# Patient Record
Sex: FEMALE | Race: WHITE | NOT HISPANIC OR LATINO | Employment: OTHER | URBAN - METROPOLITAN AREA
[De-identification: names, ages, dates, MRNs, and addresses within clinical notes are randomized per-mention and may not be internally consistent; named-entity substitution may affect disease eponyms.]

---

## 2022-06-28 ENCOUNTER — HOSPITAL ENCOUNTER (INPATIENT)
Facility: HOSPITAL | Age: 57
LOS: 1 days | Discharge: HOME | DRG: 377 | End: 2022-07-01
Attending: STUDENT IN AN ORGANIZED HEALTH CARE EDUCATION/TRAINING PROGRAM | Admitting: INTERNAL MEDICINE
Payer: COMMERCIAL

## 2022-06-28 ENCOUNTER — APPOINTMENT (OUTPATIENT)
Dept: CT IMAGING | Facility: HOSPITAL | Age: 57
DRG: 377 | End: 2022-06-28
Payer: COMMERCIAL

## 2022-06-28 ENCOUNTER — APPOINTMENT (OUTPATIENT)
Dept: MRI IMAGING | Facility: HOSPITAL | Age: 57
DRG: 377 | End: 2022-06-28
Attending: PHYSICIAN ASSISTANT
Payer: COMMERCIAL

## 2022-06-28 DIAGNOSIS — K92.1 MELENA: ICD-10-CM

## 2022-06-28 DIAGNOSIS — F10.20 ALCOHOL USE DISORDER, SEVERE, DEPENDENCE (H): ICD-10-CM

## 2022-06-28 DIAGNOSIS — S12.201D CLOSED NONDISPLACED FRACTURE OF THIRD CERVICAL VERTEBRA WITH ROUTINE HEALING, UNSPECIFIED FRACTURE MORPHOLOGY, SUBSEQUENT ENCOUNTER: ICD-10-CM

## 2022-06-28 DIAGNOSIS — N17.9 ACUTE KIDNEY INJURY (H): ICD-10-CM

## 2022-06-28 DIAGNOSIS — S12.9XXA CLOSED FRACTURE OF TRANSVERSE PROCESS OF CERVICAL VERTEBRA, INITIAL ENCOUNTER (H): ICD-10-CM

## 2022-06-28 DIAGNOSIS — R79.89 ABNORMAL LFTS: Primary | ICD-10-CM

## 2022-06-28 PROBLEM — E87.5 HYPERKALEMIA: Status: ACTIVE | Noted: 2022-06-28

## 2022-06-28 PROBLEM — K76.0 HEPATIC STEATOSIS: Status: ACTIVE | Noted: 2022-06-28

## 2022-06-28 PROBLEM — E66.9 OBESITY: Status: ACTIVE | Noted: 2022-06-28

## 2022-06-28 PROBLEM — M54.50 CHRONIC LOW BACK PAIN: Status: ACTIVE | Noted: 2022-06-28

## 2022-06-28 PROBLEM — G89.29 CHRONIC LOW BACK PAIN: Status: ACTIVE | Noted: 2022-06-28

## 2022-06-28 PROBLEM — F32.A DEPRESSION: Status: ACTIVE | Noted: 2022-06-28

## 2022-06-28 PROBLEM — M79.7 FIBROMYALGIA: Status: ACTIVE | Noted: 2022-06-28

## 2022-06-28 PROBLEM — I10 HTN (HYPERTENSION): Status: ACTIVE | Noted: 2022-06-28

## 2022-06-28 PROBLEM — S12.200A: Status: ACTIVE | Noted: 2022-06-28

## 2022-06-28 PROBLEM — E87.1 HYPONATREMIA: Status: ACTIVE | Noted: 2022-06-28

## 2022-06-28 PROBLEM — J45.909 ASTHMA: Status: ACTIVE | Noted: 2022-06-28

## 2022-06-28 LAB
ABO/RH(D): NORMAL
ALBUMIN SERPL-MCNC: 3.6 G/DL (ref 3.5–5)
ALP SERPL-CCNC: 160 U/L (ref 45–120)
ALT SERPL W P-5'-P-CCNC: 54 U/L (ref 0–45)
ANION GAP SERPL CALCULATED.3IONS-SCNC: 11 MMOL/L (ref 5–18)
ANTIBODY SCREEN: NEGATIVE
AST SERPL W P-5'-P-CCNC: 43 U/L (ref 0–40)
ATRIAL RATE - MUSE: 79 BPM
BILIRUB DIRECT SERPL-MCNC: 0.3 MG/DL
BILIRUB SERPL-MCNC: 0.7 MG/DL (ref 0–1)
BUN SERPL-MCNC: 38 MG/DL (ref 8–22)
CALCIUM SERPL-MCNC: 9.8 MG/DL (ref 8.5–10.5)
CHLORIDE BLD-SCNC: 96 MMOL/L (ref 98–107)
CO2 SERPL-SCNC: 25 MMOL/L (ref 22–31)
CREAT SERPL-MCNC: 1.13 MG/DL (ref 0.6–1.1)
DIASTOLIC BLOOD PRESSURE - MUSE: NORMAL MMHG
ERYTHROCYTE [DISTWIDTH] IN BLOOD BY AUTOMATED COUNT: 13.3 % (ref 10–15)
ETHANOL SERPL-MCNC: <10 MG/DL
GFR SERPL CREATININE-BSD FRML MDRD: 56 ML/MIN/1.73M2
GLUCOSE BLD-MCNC: 97 MG/DL (ref 70–125)
HCT VFR BLD AUTO: 36.6 % (ref 35–47)
HEMOCCULT STL QL: POSITIVE
HGB BLD-MCNC: 10.6 G/DL (ref 11.7–15.7)
HGB BLD-MCNC: 12.5 G/DL (ref 11.7–15.7)
HOLD SPECIMEN: NORMAL
INR PPP: 0.93 (ref 0.85–1.15)
INTERPRETATION ECG - MUSE: NORMAL
LACTATE SERPL-SCNC: 1 MMOL/L (ref 0.7–2)
MAGNESIUM SERPL-MCNC: 1.8 MG/DL (ref 1.8–2.6)
MCH RBC QN AUTO: 38.8 PG (ref 26.5–33)
MCHC RBC AUTO-ENTMCNC: 34.2 G/DL (ref 31.5–36.5)
MCV RBC AUTO: 114 FL (ref 78–100)
P AXIS - MUSE: 65 DEGREES
PLATELET # BLD AUTO: 202 10E3/UL (ref 150–450)
POTASSIUM BLD-SCNC: 5.2 MMOL/L (ref 3.5–5)
PR INTERVAL - MUSE: 118 MS
PROT SERPL-MCNC: 6.7 G/DL (ref 6–8)
QRS DURATION - MUSE: 72 MS
QT - MUSE: 396 MS
QTC - MUSE: 454 MS
R AXIS - MUSE: -17 DEGREES
RADIOLOGIST FLAGS: ABNORMAL
RADIOLOGIST FLAGS: ABNORMAL
RBC # BLD AUTO: 3.22 10E6/UL (ref 3.8–5.2)
SARS-COV-2 RNA RESP QL NAA+PROBE: NEGATIVE
SODIUM SERPL-SCNC: 132 MMOL/L (ref 136–145)
SPECIMEN EXPIRATION DATE: NORMAL
SYSTOLIC BLOOD PRESSURE - MUSE: NORMAL MMHG
T AXIS - MUSE: 73 DEGREES
TROPONIN I SERPL-MCNC: <0.01 NG/ML (ref 0–0.29)
VENTRICULAR RATE- MUSE: 79 BPM
WBC # BLD AUTO: 6.2 10E3/UL (ref 4–11)

## 2022-06-28 PROCEDURE — 96375 TX/PRO/DX INJ NEW DRUG ADDON: CPT

## 2022-06-28 PROCEDURE — 72125 CT NECK SPINE W/O DYE: CPT

## 2022-06-28 PROCEDURE — C9113 INJ PANTOPRAZOLE SODIUM, VIA: HCPCS | Performed by: INTERNAL MEDICINE

## 2022-06-28 PROCEDURE — 82272 OCCULT BLD FECES 1-3 TESTS: CPT | Performed by: PHYSICIAN ASSISTANT

## 2022-06-28 PROCEDURE — 70450 CT HEAD/BRAIN W/O DYE: CPT

## 2022-06-28 PROCEDURE — 86901 BLOOD TYPING SEROLOGIC RH(D): CPT | Performed by: PHYSICIAN ASSISTANT

## 2022-06-28 PROCEDURE — 70498 CT ANGIOGRAPHY NECK: CPT

## 2022-06-28 PROCEDURE — 258N000003 HC RX IP 258 OP 636: Performed by: PHYSICIAN ASSISTANT

## 2022-06-28 PROCEDURE — 82248 BILIRUBIN DIRECT: CPT | Performed by: PHYSICIAN ASSISTANT

## 2022-06-28 PROCEDURE — 87635 SARS-COV-2 COVID-19 AMP PRB: CPT | Performed by: INTERNAL MEDICINE

## 2022-06-28 PROCEDURE — 84484 ASSAY OF TROPONIN QUANT: CPT | Performed by: PHYSICIAN ASSISTANT

## 2022-06-28 PROCEDURE — 80053 COMPREHEN METABOLIC PANEL: CPT | Performed by: PHYSICIAN ASSISTANT

## 2022-06-28 PROCEDURE — 250N000013 HC RX MED GY IP 250 OP 250 PS 637: Performed by: INTERNAL MEDICINE

## 2022-06-28 PROCEDURE — 96374 THER/PROPH/DIAG INJ IV PUSH: CPT | Mod: 59

## 2022-06-28 PROCEDURE — 250N000011 HC RX IP 250 OP 636: Performed by: STUDENT IN AN ORGANIZED HEALTH CARE EDUCATION/TRAINING PROGRAM

## 2022-06-28 PROCEDURE — 36415 COLL VENOUS BLD VENIPUNCTURE: CPT | Performed by: INTERNAL MEDICINE

## 2022-06-28 PROCEDURE — 83735 ASSAY OF MAGNESIUM: CPT | Performed by: INTERNAL MEDICINE

## 2022-06-28 PROCEDURE — 85018 HEMOGLOBIN: CPT | Performed by: INTERNAL MEDICINE

## 2022-06-28 PROCEDURE — G0378 HOSPITAL OBSERVATION PER HR: HCPCS

## 2022-06-28 PROCEDURE — 99285 EMERGENCY DEPT VISIT HI MDM: CPT | Mod: 25

## 2022-06-28 PROCEDURE — C9803 HOPD COVID-19 SPEC COLLECT: HCPCS

## 2022-06-28 PROCEDURE — 85610 PROTHROMBIN TIME: CPT | Performed by: PHYSICIAN ASSISTANT

## 2022-06-28 PROCEDURE — 93005 ELECTROCARDIOGRAM TRACING: CPT | Performed by: PHYSICIAN ASSISTANT

## 2022-06-28 PROCEDURE — 99220 PR INITIAL OBSERVATION CARE,LEVEL III: CPT | Performed by: INTERNAL MEDICINE

## 2022-06-28 PROCEDURE — 72141 MRI NECK SPINE W/O DYE: CPT

## 2022-06-28 PROCEDURE — 82077 ASSAY SPEC XCP UR&BREATH IA: CPT | Performed by: PHYSICIAN ASSISTANT

## 2022-06-28 PROCEDURE — 86850 RBC ANTIBODY SCREEN: CPT | Performed by: PHYSICIAN ASSISTANT

## 2022-06-28 PROCEDURE — 96361 HYDRATE IV INFUSION ADD-ON: CPT

## 2022-06-28 PROCEDURE — 36415 COLL VENOUS BLD VENIPUNCTURE: CPT | Performed by: STUDENT IN AN ORGANIZED HEALTH CARE EDUCATION/TRAINING PROGRAM

## 2022-06-28 PROCEDURE — 83605 ASSAY OF LACTIC ACID: CPT | Performed by: STUDENT IN AN ORGANIZED HEALTH CARE EDUCATION/TRAINING PROGRAM

## 2022-06-28 PROCEDURE — 250N000011 HC RX IP 250 OP 636: Performed by: INTERNAL MEDICINE

## 2022-06-28 PROCEDURE — 36415 COLL VENOUS BLD VENIPUNCTURE: CPT | Performed by: PHYSICIAN ASSISTANT

## 2022-06-28 PROCEDURE — 258N000003 HC RX IP 258 OP 636: Performed by: INTERNAL MEDICINE

## 2022-06-28 PROCEDURE — 85027 COMPLETE CBC AUTOMATED: CPT | Performed by: PHYSICIAN ASSISTANT

## 2022-06-28 RX ORDER — PROCHLORPERAZINE MALEATE 10 MG
10 TABLET ORAL EVERY 6 HOURS PRN
Status: DISCONTINUED | OUTPATIENT
Start: 2022-06-28 | End: 2022-07-01 | Stop reason: HOSPADM

## 2022-06-28 RX ORDER — DULOXETIN HYDROCHLORIDE 60 MG/1
120 CAPSULE, DELAYED RELEASE ORAL AT BEDTIME
Status: ON HOLD | COMMUNITY
End: 2022-08-08

## 2022-06-28 RX ORDER — ACETAMINOPHEN 325 MG/1
975 TABLET ORAL EVERY 8 HOURS
Status: DISCONTINUED | OUTPATIENT
Start: 2022-06-28 | End: 2022-07-01 | Stop reason: HOSPADM

## 2022-06-28 RX ORDER — FLUTICASONE PROPIONATE 50 MCG
2 SPRAY, SUSPENSION (ML) NASAL DAILY
Status: DISCONTINUED | OUTPATIENT
Start: 2022-06-29 | End: 2022-07-01 | Stop reason: HOSPADM

## 2022-06-28 RX ORDER — DULOXETIN HYDROCHLORIDE 60 MG/1
120 CAPSULE, DELAYED RELEASE ORAL AT BEDTIME
Status: DISCONTINUED | OUTPATIENT
Start: 2022-06-28 | End: 2022-07-01 | Stop reason: HOSPADM

## 2022-06-28 RX ORDER — ONDANSETRON 2 MG/ML
4 INJECTION INTRAMUSCULAR; INTRAVENOUS EVERY 6 HOURS PRN
Status: DISCONTINUED | OUTPATIENT
Start: 2022-06-28 | End: 2022-07-01 | Stop reason: HOSPADM

## 2022-06-28 RX ORDER — SIMVASTATIN 10 MG
20 TABLET ORAL AT BEDTIME
Status: DISCONTINUED | OUTPATIENT
Start: 2022-06-28 | End: 2022-07-01 | Stop reason: HOSPADM

## 2022-06-28 RX ORDER — OXYCODONE HYDROCHLORIDE 5 MG/1
5 TABLET ORAL EVERY 4 HOURS PRN
Status: DISCONTINUED | OUTPATIENT
Start: 2022-06-28 | End: 2022-07-01 | Stop reason: HOSPADM

## 2022-06-28 RX ORDER — MORPHINE SULFATE 2 MG/ML
2 INJECTION, SOLUTION INTRAMUSCULAR; INTRAVENOUS ONCE
Status: COMPLETED | OUTPATIENT
Start: 2022-06-28 | End: 2022-06-28

## 2022-06-28 RX ORDER — ONDANSETRON 4 MG/1
4 TABLET, ORALLY DISINTEGRATING ORAL EVERY 8 HOURS PRN
COMMUNITY

## 2022-06-28 RX ORDER — HYDRALAZINE HYDROCHLORIDE 20 MG/ML
10 INJECTION INTRAMUSCULAR; INTRAVENOUS EVERY 4 HOURS PRN
Status: DISCONTINUED | OUTPATIENT
Start: 2022-06-28 | End: 2022-07-01 | Stop reason: HOSPADM

## 2022-06-28 RX ORDER — ACETAMINOPHEN 325 MG/1
650 TABLET ORAL EVERY 6 HOURS PRN
Status: DISCONTINUED | OUTPATIENT
Start: 2022-06-28 | End: 2022-06-28

## 2022-06-28 RX ORDER — LORATADINE 10 MG/1
10 TABLET ORAL DAILY PRN
Status: DISCONTINUED | OUTPATIENT
Start: 2022-06-28 | End: 2022-07-01 | Stop reason: HOSPADM

## 2022-06-28 RX ORDER — IOPAMIDOL 755 MG/ML
75 INJECTION, SOLUTION INTRAVASCULAR ONCE
Status: COMPLETED | OUTPATIENT
Start: 2022-06-28 | End: 2022-06-28

## 2022-06-28 RX ORDER — PROCHLORPERAZINE 25 MG
25 SUPPOSITORY, RECTAL RECTAL EVERY 12 HOURS PRN
Status: DISCONTINUED | OUTPATIENT
Start: 2022-06-28 | End: 2022-07-01 | Stop reason: HOSPADM

## 2022-06-28 RX ORDER — CELECOXIB 200 MG/1
400 CAPSULE ORAL AT BEDTIME
COMMUNITY
End: 2022-07-01

## 2022-06-28 RX ORDER — ALBUTEROL SULFATE 90 UG/1
2 AEROSOL, METERED RESPIRATORY (INHALATION) EVERY 4 HOURS PRN
Status: DISCONTINUED | OUTPATIENT
Start: 2022-06-28 | End: 2022-07-01 | Stop reason: HOSPADM

## 2022-06-28 RX ORDER — CALCIUM CARBONATE 500 MG/1
1000 TABLET, CHEWABLE ORAL 4 TIMES DAILY PRN
Status: DISCONTINUED | OUTPATIENT
Start: 2022-06-28 | End: 2022-07-01 | Stop reason: HOSPADM

## 2022-06-28 RX ORDER — ACETAMINOPHEN 650 MG/1
650 SUPPOSITORY RECTAL EVERY 6 HOURS PRN
Status: DISCONTINUED | OUTPATIENT
Start: 2022-06-28 | End: 2022-06-28

## 2022-06-28 RX ORDER — LANOLIN ALCOHOL/MO/W.PET/CERES
3 CREAM (GRAM) TOPICAL
Status: DISCONTINUED | OUTPATIENT
Start: 2022-06-28 | End: 2022-06-30

## 2022-06-28 RX ORDER — ONDANSETRON 4 MG/1
4 TABLET, ORALLY DISINTEGRATING ORAL EVERY 6 HOURS PRN
Status: DISCONTINUED | OUTPATIENT
Start: 2022-06-28 | End: 2022-07-01 | Stop reason: HOSPADM

## 2022-06-28 RX ORDER — LISINOPRIL 20 MG/1
20 TABLET ORAL AT BEDTIME
COMMUNITY
End: 2022-07-01

## 2022-06-28 RX ORDER — LORATADINE 10 MG/1
10 TABLET ORAL DAILY PRN
COMMUNITY

## 2022-06-28 RX ORDER — SODIUM CHLORIDE 9 MG/ML
INJECTION, SOLUTION INTRAVENOUS CONTINUOUS
Status: DISCONTINUED | OUTPATIENT
Start: 2022-06-28 | End: 2022-06-29

## 2022-06-28 RX ADMIN — IOPAMIDOL 75 ML: 755 INJECTION, SOLUTION INTRAVENOUS at 15:13

## 2022-06-28 RX ADMIN — MORPHINE SULFATE 2 MG: 2 INJECTION, SOLUTION INTRAMUSCULAR; INTRAVENOUS at 16:41

## 2022-06-28 RX ADMIN — PANTOPRAZOLE SODIUM 40 MG: 40 INJECTION, POWDER, FOR SOLUTION INTRAVENOUS at 18:35

## 2022-06-28 RX ADMIN — OXYCODONE HYDROCHLORIDE 5 MG: 5 TABLET ORAL at 21:49

## 2022-06-28 RX ADMIN — SIMVASTATIN 20 MG: 10 TABLET, FILM COATED ORAL at 21:49

## 2022-06-28 RX ADMIN — ACETAMINOPHEN 975 MG: 325 TABLET, FILM COATED ORAL at 21:49

## 2022-06-28 RX ADMIN — SODIUM CHLORIDE: 9 INJECTION, SOLUTION INTRAVENOUS at 19:30

## 2022-06-28 RX ADMIN — SODIUM CHLORIDE 1000 ML: 9 INJECTION, SOLUTION INTRAVENOUS at 14:51

## 2022-06-28 RX ADMIN — DULOXETINE HYDROCHLORIDE 120 MG: 60 CAPSULE, DELAYED RELEASE PELLETS ORAL at 21:49

## 2022-06-28 SDOH — ECONOMIC STABILITY: TRANSPORTATION INSECURITY
IN THE PAST 12 MONTHS, HAS LACK OF TRANSPORTATION KEPT YOU FROM MEETINGS, WORK, OR FROM GETTING THINGS NEEDED FOR DAILY LIVING?: NO

## 2022-06-28 SDOH — ECONOMIC STABILITY: FOOD INSECURITY: WITHIN THE PAST 12 MONTHS, YOU WORRIED THAT YOUR FOOD WOULD RUN OUT BEFORE YOU GOT MONEY TO BUY MORE.: NEVER TRUE

## 2022-06-28 SDOH — HEALTH STABILITY: PHYSICAL HEALTH: ON AVERAGE, HOW MANY MINUTES DO YOU ENGAGE IN EXERCISE AT THIS LEVEL?: 0 MIN

## 2022-06-28 SDOH — HEALTH STABILITY: PHYSICAL HEALTH: ON AVERAGE, HOW MANY DAYS PER WEEK DO YOU ENGAGE IN MODERATE TO STRENUOUS EXERCISE (LIKE A BRISK WALK)?: 0 DAYS

## 2022-06-28 SDOH — ECONOMIC STABILITY: TRANSPORTATION INSECURITY
IN THE PAST 12 MONTHS, HAS THE LACK OF TRANSPORTATION KEPT YOU FROM MEDICAL APPOINTMENTS OR FROM GETTING MEDICATIONS?: NO

## 2022-06-28 SDOH — ECONOMIC STABILITY: FOOD INSECURITY: WITHIN THE PAST 12 MONTHS, THE FOOD YOU BOUGHT JUST DIDN'T LAST AND YOU DIDN'T HAVE MONEY TO GET MORE.: NEVER TRUE

## 2022-06-28 ASSESSMENT — ENCOUNTER SYMPTOMS
FEVER: 0
BLOOD IN STOOL: 1
DIARRHEA: 1
WEAKNESS: 0
HEMATURIA: 0
HEADACHES: 0
COUGH: 0
DIZZINESS: 1
BACK PAIN: 1
CHILLS: 0
DYSURIA: 0
COLOR CHANGE: 1
VOMITING: 0
SHORTNESS OF BREATH: 0
NAUSEA: 0
FREQUENCY: 0
ARTHRALGIAS: 1
NUMBNESS: 0
ABDOMINAL PAIN: 0

## 2022-06-28 ASSESSMENT — SOCIAL DETERMINANTS OF HEALTH (SDOH)
HOW OFTEN DO YOU ATTENT MEETINGS OF THE CLUB OR ORGANIZATION YOU BELONG TO?: NEVER
HOW OFTEN DO YOU GET TOGETHER WITH FRIENDS OR RELATIVES?: ONCE A WEEK
IN A TYPICAL WEEK, HOW MANY TIMES DO YOU TALK ON THE PHONE WITH FAMILY, FRIENDS, OR NEIGHBORS?: ONCE A WEEK
HOW OFTEN DO YOU ATTEND CHURCH OR RELIGIOUS SERVICES?: NEVER
HOW HARD IS IT FOR YOU TO PAY FOR THE VERY BASICS LIKE FOOD, HOUSING, MEDICAL CARE, AND HEATING?: NOT HARD AT ALL
ARE YOU MARRIED, WIDOWED, DIVORCED, SEPARATED, NEVER MARRIED, OR LIVING WITH A PARTNER?: NEVER MARRIED
DO YOU BELONG TO ANY CLUBS OR ORGANIZATIONS SUCH AS CHURCH GROUPS UNIONS, FRATERNAL OR ATHLETIC GROUPS, OR SCHOOL GROUPS?: NO

## 2022-06-28 ASSESSMENT — LIFESTYLE VARIABLES
HOW MANY STANDARD DRINKS CONTAINING ALCOHOL DO YOU HAVE ON A TYPICAL DAY: 1 OR 2
HOW OFTEN DO YOU HAVE A DRINK CONTAINING ALCOHOL: 2-4 TIMES A MONTH
AUDIT-C TOTAL SCORE: 3
HOW OFTEN DO YOU HAVE SIX OR MORE DRINKS ON ONE OCCASION: LESS THAN MONTHLY
SKIP TO QUESTIONS 9-10: 0

## 2022-06-28 NOTE — PHARMACY-ADMISSION MEDICATION HISTORY
Pharmacy Note - Admission Medication History    Pertinent Provider Information:       ______________________________________________________________________    Prior To Admission (PTA) med list completed and updated in EMR.       PTA Med List   Medication Sig Last Dose     albuterol (PROAIR HFA/PROVENTIL HFA/VENTOLIN HFA) 108 (90 Base) MCG/ACT inhaler Inhale 2 puffs into the lungs every 4 hours as needed Unknown at Unknown time     celecoxib (CELEBREX) 200 MG capsule Take 400 mg by mouth At Bedtime 6/27/2022 at Unknown time     DULoxetine (CYMBALTA) 60 MG capsule Take 120 mg by mouth At Bedtime 6/27/2022 at Unknown time     fluticasone (FLONASE) 50 MCG/ACT nasal spray Spray 2 sprays in nostril daily 6/28/2022 at Unknown time     lisinopril (ZESTRIL) 20 MG tablet Take 20 mg by mouth At Bedtime 6/27/2022 at Unknown time     loratadine (CLARITIN) 10 MG tablet Take 10 mg by mouth daily as needed for allergies Unknown at Unknown time     ondansetron (ZOFRAN ODT) 4 MG ODT tab Take 4 mg by mouth every 8 hours as needed for nausea Unknown at Unknown time       [facility]  Method of interview communication: in-person    Summary of Changes to PTA Med List  New: Celebrex, duloxetine, lisinopril, loratadine, Zofran  Discontinued:    Changed:      Patient was asked about OTC/herbal products specifically.  PTA med list reflects this.    In the past week, patient estimated taking medication this percent of the time:  greater than 90%.    Allergies were reviewed, assessed, and updated with the patient.      Patient did not bring any medications to the hospital and can't retrieve from home. No multi-dose medications are available for use during hospital stay.     The information provided in this note is only as accurate as the sources available at the time of the update(s).    Thank you for the opportunity to participate in the care of this patient.    Dwayne Porras HALEY  6/28/2022 3:59  PM

## 2022-06-28 NOTE — ED NOTES
Pt herec/o dark, tarry stools x 2 beginning last pm. Fall last Sunday with pain to left side of neck, right hip, low back pain. Pt is AAO x 3. When pt fell Sunday she woke up on the floor and does not remember falling. Hx of ETOH abuse drinks approx every other day  Neuro: Oriented x4  IV/drain: PIV right arm   VSS: VSS on RA  Resp: Clear    Cardio: WDL.    Pain/Discomfort: chronic back and hip pain  Activity: Up independently   Skin: bruising left side of neck and left arm

## 2022-06-28 NOTE — PROGRESS NOTES
Neurosurgery Treatment Plan:   Reviewed patients chart  56 year old right handed female presented with blood in stool noted bursing on neck and complaint of neck pain with history of fall last week. Unsure why she fell but woke up on the floor. No upper extremity radicular complaints of pain numbness tingling or weakness. Denies urinary incontinence or gait instability. Per provider neurologically intact with good strength in all extremities        Images:   CT reviewed personally with noted C3 right transverse process fracture   No vertebral artery injury        Plan:   Cervical collar at all times  MRI cervical without contrast  Okay for activity with collar on      Sandra Daniel PA-C  [facility] Neurosurgery  O: [phone]

## 2022-06-28 NOTE — ED PROVIDER NOTES
EMERGENCY DEPARTMENT ENCOUNTER      NAME: Rachel Schneider  AGE: 56 year old female  YOB: 1965  MRN: [id number]  EVALUATION DATE & TIME: 6/28/2022 12:55 PM    PCP: Marino Hightower    ED PROVIDER: Kaylah Frank PA-C      Chief Complaint   Patient presents with     Rectal Bleeding     Neck Pain         FINAL IMPRESSION:  1. Closed fracture of transverse process of cervical vertebra, initial encounter (H)    2. Melena    3. Acute kidney injury (H)          MEDICAL DECISION MAKING:    Pertinent Labs & Imaging studies reviewed. (See chart for details)  56 year old female presents to the Emergency Department for evaluation of dark tarry stools x 2 since last night. Also reports falling 2 days ago. Syncope versus fall, patient is unsure. Was able to get up after and ambulate. Has developed bruising to her left anterior neck and left arm. She is not anticoagulated. She denies headaches, vision changes, chest pain, shortness of breath, abdominal pain, musculoskeletal injury from the fall.     Vitals reviewed and notable for soft blood pressure on arrival in triage 82/50. On recheck and throughout time in ED, blood pressure stable 110-120/60-80. Afebrile. Normal heart rate. No respiratory distress. GCS 15. No evidence of head trauma with no scalp tenderness or hematoma. She has aged ecchymosis to left anterior neck. No midline posterior cervical spine tenderness. Full ROM of neck without difficulty. No chest wall tenderness. Lungs CTAB. Normal S1/S2. Abdomen is soft and non-tender. Rectal exam with dark tarry stool in rectal vault. Darkened skin to mid lower back which she reports is from burning her skin on heating pad. No midline spinal tenderness or step off deformities. No focal neuro deficits. Remainder of head to toe trauma exam is unremarkable other than chronic left hip pain that is unchanged. Differential diagnosis includes but not limited to upper versus lower GI bleed including PUD, variceal  bleeding, gastritis, colitis, diverticulitis, hemorrhoids. For syncope versus fall, considered ICH, cervical spine fracture, cardiogenic etiology including ACS, PE, cardiac dysrhythmia, symptomatic anemia, electrolyte derangement, seizure, CVA, hypoglycemia, valvular disease.     Hgb 12.5 which is very reassuring considering her fecal occult is positive though is down trending from most recent check 3 weeks ago when it was 14.0. Type and screen ordered and she was consented for blood in event she becomes hemodynamically unstable or sudden drop in Hgb. She was given 1 L IVF for suspected mild dehydration with slight hyponatremia and mild ARJUN with Cr 1.13, BUN 38. EKG without acute ischemic changes, normal intervals, NSR. Troponin is WNL. No chest pain, pleuritic pain, shortness of breath, tachycardia, hypoxia with low suspicion for PE. Syncope could be secondary to blood loss/hypotension. She recently had lisinopril dose doubled due to elevated blood pressure on pre op exam. Mildly elevated LFTs with alk phos 160, AST 43, ALT 54. Could be alcohol related. Total bilirubin in WNL. No abdominal tenderness, rebound or guarding to suggest acute hepatobiliary etiology and with normal Hgb, low suspicion for brisk bleed that would be detected on CTA. Head and neck imaging given recent fall with unknown cause and poor history. CT head unremarkable however CT cervical spine with nondisplaced fracture of the right transverse process at C3. This involves the right transverse foramen which prompted a CTA neck which was without evidence of vascular or soft tissue injury. She was placed in cervical collar and neurosurgery was consulted who will evaluate patient and also ordered orthotic consult as she will need to wear collar for extended period of time. Given melena, syncopal episode, C3 transverse process fracture, patient will be admitted for further evaluation and work up.    0 minutes of critical care time     ED COURSE:  1:07  PM I met with the patient, obtained history, performed an initial exam, and discussed options and plan for diagnostics and treatment here in the ED.  2:33 PM Spoke with radiologist, acute C3 transverse process fracture. Will place patient in C-collar and patient will go back for a CTA neck. I updated the patient.  2:40 PM I staffed the patient with Dr. Workman.   4:45 PM Spoke with Dr. Rubio, hospitalist, who accepts patient for admission    At the conclusion of the encounter I discussed the results of all of the tests and the indication for admission. The questions were answered. The patient acknowledged understanding and was agreeable with the care plan.     MEDICATIONS GIVEN IN THE EMERGENCY:  Medications   sodium chloride 0.9% infusion (has no administration in time range)   hydrALAZINE (APRESOLINE) injection 10 mg (has no administration in time range)   albuterol (PROVENTIL HFA/VENTOLIN HFA) inhaler (has no administration in time range)   DULoxetine (CYMBALTA) DR capsule 120 mg (has no administration in time range)   fluticasone (FLONASE) 50 MCG/ACT spray 2 spray (has no administration in time range)   loratadine (CLARITIN) tablet 10 mg (has no administration in time range)   acetaminophen (TYLENOL) tablet 650 mg (has no administration in time range)     Or   acetaminophen (TYLENOL) Suppository 650 mg (has no administration in time range)   melatonin tablet 3 mg (has no administration in time range)   ondansetron (ZOFRAN ODT) ODT tab 4 mg (has no administration in time range)     Or   ondansetron (ZOFRAN) injection 4 mg (has no administration in time range)   prochlorperazine (COMPAZINE) injection 10 mg (has no administration in time range)     Or   prochlorperazine (COMPAZINE) tablet 10 mg (has no administration in time range)     Or   prochlorperazine (COMPAZINE) suppository 25 mg (has no administration in time range)   calcium carbonate (TUMS) chewable tablet 1,000 mg (has no administration in time range)  "  pantoprazole (PROTONIX) IV push injection 40 mg (40 mg Intravenous Given 6/28/22 1835)   simvastatin (ZOCOR) tablet 20 mg (has no administration in time range)   0.9% sodium chloride BOLUS (0 mLs Intravenous Stopped 6/28/22 1828)   iopamidol (ISOVUE-370) solution 75 mL (75 mLs Intravenous Given 6/28/22 1513)   morphine (PF) injection 2 mg (2 mg Intravenous Given 6/28/22 1641)       NEW PRESCRIPTIONS STARTED AT TODAY'S ER VISIT  New Prescriptions    No medications on file            =================================================================    HPI:    Patient information was obtained from: Patient    Use of Interpretor: N/A      Rachel Schneider is a 56 year old female with a pertinent history of HTN, cervicalgia s/p cervical spine fusion, hysterectomy (s/p 2014), and cholecystectomy (s/p 2014) who presents to this ED by EMS for evaluation of dark stool.    Patient states that last night her stool was \"tarry\" and dark with a diarrhea consistency. No associated abdominal pain. She is not on anticoagulants and does not take aspirin or NSAIDs regularly. She does endorse drinking 1-2 drinks every couple of days though denies any history of alcohol related illness or alcohol withdrawal.    Patient reports that she has been feeling overall unwell the whole week. She reports feeling weak, tired, dizzy at times. She states that she has experienced \"bad\" heart burn but no chest pain or shortness of breath. She denies any history of GI bleeding. She denies nausea, vomiting, fevers, chills, urinary symptoms, headaches.     Patient also states that on 6/26 (~2 days ago) she had a fall versus syncopal episode. She does not recall how or why she fell. She is unsure if she hit her head. She was able to get up after and ambulate. She has developed bruising on the left anterior neck and scattered across the left arm. She reports she bruises easily. She denies any posterior neck pain, extremity weakness or paresthesias, " bowel/bladder dysfunction.    Of note, patient has chronic right hip pain and back pain. Hip was scheduled to be replaced on 6/13 (~15 days ago) but was not performed due to patients high blood pressure. Her PCP doubled her lisinopril and plan was to reschedule surgery if blood pressure improved. Patient denies additional medical concerns or complaints at this time.       REVIEW OF SYSTEMS:  Review of Systems   Constitutional: Negative for chills and fever.   Respiratory: Negative for cough and shortness of breath.    Cardiovascular: Negative for chest pain.   Gastrointestinal: Positive for blood in stool and diarrhea (tarry, dark). Negative for abdominal pain, nausea and vomiting.   Genitourinary: Negative for dysuria, frequency and hematuria.   Musculoskeletal: Positive for arthralgias (chronic right hip with no change) and back pain (chronic).   Skin: Positive for color change (bruises to neck and left arm).   Neurological: Positive for dizziness and syncope. Negative for weakness, numbness and headaches.   All other systems reviewed and are negative.        PAST MEDICAL HISTORY:  Past Medical History:   Diagnosis Date     Asthma      Chronic low back pain      Depression      Fibromyalgia      Hepatic steatosis      HTN (hypertension)      MRSA infection      Obesity        PAST SURGICAL HISTORY:  Past Surgical History:   Procedure Laterality Date     EXCISE BREAST CYST/FIBROADENOMA/TUMOR/DUCT LESION/NIPPLE LESION/AREOLAR LESION      Description: Breast Surgery Lumpectomy;  Recorded: 07/09/2014;     [facility] REMOVAL GALLBLADDER      Description: Cholecystectomy;  Recorded: 07/09/2014;     [facility] REMOVAL OF TONSILS,<13 Y/O      Description: Tonsillectomy;  Recorded: 07/09/2014;     [facility] CERV SPINE FUSN,ANTER,BELOW C2      Description: Cervical Vertebral Fusion;  Recorded: 07/09/2014;     [facility] GARY W/O FACETEC FORAMOT/HECTOR 1/2 VRT SEG, CERVICAL      Description: Laminectomy Lumbar;  Recorded: 07/09/2014;     [facility] TOTAL ABDOM  HYSTERECTOMY      Description: Hysterectomy;  Recorded: 07/09/2014;           CURRENT MEDICATIONS:      Current Facility-Administered Medications:      acetaminophen (TYLENOL) tablet 650 mg, 650 mg, Oral, Q6H PRN **OR** acetaminophen (TYLENOL) Suppository 650 mg, 650 mg, Rectal, Q6H PRN, Dada Rubio DO     albuterol (PROVENTIL HFA/VENTOLIN HFA) inhaler, 2 puff, Inhalation, Q4H PRN, Dada Rubio DO     calcium carbonate (TUMS) chewable tablet 1,000 mg, 1,000 mg, Oral, 4x Daily PRN, Dada Rubio DO     DULoxetine (CYMBALTA) DR capsule 120 mg, 120 mg, Oral, At Bedtime, Dada Rubio DO     [START ON 6/29/2022] fluticasone (FLONASE) 50 MCG/ACT spray 2 spray, 2 spray, Nasal, Daily, Dada Rubio DO     hydrALAZINE (APRESOLINE) injection 10 mg, 10 mg, Intravenous, Q4H PRN, Dada Rubio DO     loratadine (CLARITIN) tablet 10 mg, 10 mg, Oral, Daily PRN, Dada Rubio DO     melatonin tablet 3 mg, 3 mg, Oral, At Bedtime PRN, Dada Rubio DO     ondansetron (ZOFRAN ODT) ODT tab 4 mg, 4 mg, Oral, Q6H PRN **OR** ondansetron (ZOFRAN) injection 4 mg, 4 mg, Intravenous, Q6H PRN, Dada Rubio DO     pantoprazole (PROTONIX) IV push injection 40 mg, 40 mg, Intravenous, Q12H, Dada Rubio DO, 40 mg at 06/28/22 1835     prochlorperazine (COMPAZINE) injection 10 mg, 10 mg, Intravenous, Q6H PRN **OR** prochlorperazine (COMPAZINE) tablet 10 mg, 10 mg, Oral, Q6H PRN **OR** prochlorperazine (COMPAZINE) suppository 25 mg, 25 mg, Rectal, Q12H PRN, Rossini, Dada A, DO     simvastatin (ZOCOR) tablet 20 mg, 20 mg, Oral, At Bedtime, Dada Rubio DO     sodium chloride 0.9% infusion, , Intravenous, Continuous, Dada Rubio,     Current Outpatient Medications:      albuterol (PROAIR HFA/PROVENTIL HFA/VENTOLIN HFA) 108 (90 Base) MCG/ACT inhaler, Inhale 2 puffs into the lungs every 4 hours as needed, Disp: , Rfl:      celecoxib (CELEBREX) 200 MG capsule, Take 400 mg  "by mouth At Bedtime, Disp: , Rfl:      DULoxetine (CYMBALTA) 60 MG capsule, Take 120 mg by mouth At Bedtime, Disp: , Rfl:      fluticasone (FLONASE) 50 MCG/ACT nasal spray, Spray 2 sprays in nostril daily, Disp: , Rfl:      lisinopril (ZESTRIL) 20 MG tablet, Take 20 mg by mouth At Bedtime, Disp: , Rfl:      loratadine (CLARITIN) 10 MG tablet, Take 10 mg by mouth daily as needed for allergies, Disp: , Rfl:      ondansetron (ZOFRAN ODT) 4 MG ODT tab, Take 4 mg by mouth every 8 hours as needed for nausea, Disp: , Rfl:       ALLERGIES:  Allergies   Allergen Reactions     Gabapentin Unknown       FAMILY HISTORY:  Family History   Adopted: Yes   Problem Relation Age of Onset     Breast Cancer Mother        SOCIAL HISTORY:   Social History     Socioeconomic History     Marital status:    Substance and Sexual Activity     Alcohol use: Yes     Alcohol/week: 3.3 - 4.2 standard drinks     Types: 4 - 5 drink(s) per week       VITALS:  Patient Vitals for the past 24 hrs:   BP Temp Temp src Pulse Resp SpO2 Height Weight   06/28/22 1915 111/63 -- -- 70 -- 99 % -- --   06/28/22 1900 111/68 -- -- 72 -- 99 % -- --   06/28/22 1854 -- -- -- 72 -- 99 % -- --   06/28/22 1845 104/73 -- -- 75 -- 98 % -- --   06/28/22 1445 126/89 -- -- 108 21 100 % -- --   06/28/22 1400 129/60 -- -- 80 18 100 % -- --   06/28/22 1337 119/52 -- -- 76 -- 100 % -- --   06/28/22 1313 121/60 -- -- 80 16 98 % -- --   06/28/22 1301 (!) 82/50 97.8  F (36.6  C) Oral -- 20 -- 1.6 m (5' 3\") 63.9 kg (140 lb 14 oz)       PHYSICAL EXAM   Constitutional: Well developed, Well nourished, NAD  HENT: Normocephalic, Atraumatic, Bilateral external ears normal, Oropharynx normal, mucous membranes moist, Nose normal.   Neck: Normal range of motion, No midline cervical spine tenderness, Supple, No stridor. Old appearing ecchymosis to left anterior neck with mild tenderness to palpation of soft tissue.   Eyes: PERRL, EOMI, Conjunctiva normal, No discharge.   Respiratory: " Normal breath sounds, No respiratory distress, No wheezing, Speaks full sentences easily. No cough.  Cardiovascular: Normal heart rate, Regular rhythm, No murmurs, No rubs, No gallops. Chest wall nontender.  GI: Soft, No tenderness, No masses, No flank tenderness. No rebound or guarding.  Rectal Exam: Dark tarry stool in rectal vault. Fecal occult positive. No external hemorrhoids. No pain with HAYDEN. No palpable fluctuance or induration surrounding rectum.  Musculoskeletal: 2+ DP pulses. No edema. No cyanosis, No clubbing. Good range of motion in all major joints. No tenderness to palpation or major deformities noted. No tenderness of the midline CTLS spine. No step off deformities. Darkened skin over center of lower back (patient reports this is from burning skin with heating pad).   Integument: Warm, Dry, No erythema, No rash. No petechiae. Scattered aged ecchymosis to left arm.  Neurologic: Alert & oriented x 3, Normal motor function, Normal sensory function, No focal deficits noted. Normal gait.  Psychiatric: Affect normal, Judgment normal, Mood normal. Cooperative.    LAB:  All pertinent labs reviewed and interpreted.  Recent Results (from the past 24 hour(s))   ECG 12-LEAD WITH MUSE (LHE)    Collection Time: 06/28/22  1:25 PM   Result Value Ref Range    Systolic Blood Pressure  mmHg    Diastolic Blood Pressure  mmHg    Ventricular Rate 79 BPM    Atrial Rate 79 BPM    CO Interval 118 ms    QRS Duration 72 ms     ms    QTc 454 ms    P Axis 65 degrees    R AXIS -17 degrees    T Axis 73 degrees    Interpretation ECG       Sinus rhythm with marked sinus arrhythmia  Nonspecific ST and T wave abnormality  Abnormal ECG  When compared with ECG of 21-JUN-2019 11:28,  Nonspecific T wave abnormality, worse in Lateral leads  Confirmed by SEE ED PROVIDER NOTE FOR, ECG INTERPRETATION ([id number]),  LUZMA RAY ([id number]) on 6/28/2022 2:12:04 PM     CBC (+ platelets, no diff)    Collection Time: 06/28/22  1:33 PM    Result Value Ref Range    WBC Count 6.2 4.0 - 11.0 10e3/uL    RBC Count 3.22 (L) 3.80 - 5.20 10e6/uL    Hemoglobin 12.5 11.7 - 15.7 g/dL    Hematocrit 36.6 35.0 - 47.0 %     (H) 78 - 100 fL    MCH 38.8 (H) 26.5 - 33.0 pg    MCHC 34.2 31.5 - 36.5 g/dL    RDW 13.3 10.0 - 15.0 %    Platelet Count 202 150 - 450 10e3/uL   Basic metabolic panel    Collection Time: 06/28/22  1:33 PM   Result Value Ref Range    Sodium 132 (L) 136 - 145 mmol/L    Potassium 5.2 (H) 3.5 - 5.0 mmol/L    Chloride 96 (L) 98 - 107 mmol/L    Carbon Dioxide (CO2) 25 22 - 31 mmol/L    Anion Gap 11 5 - 18 mmol/L    Urea Nitrogen 38 (H) 8 - 22 mg/dL    Creatinine 1.13 (H) 0.60 - 1.10 mg/dL    Calcium 9.8 8.5 - 10.5 mg/dL    Glucose 97 70 - 125 mg/dL    GFR Estimate 56 (L) >60 mL/min/1.73m2   Hepatic function panel    Collection Time: 06/28/22  1:33 PM   Result Value Ref Range    Bilirubin Total 0.7 0.0 - 1.0 mg/dL    Bilirubin Direct 0.3 <=0.5 mg/dL    Protein Total 6.7 6.0 - 8.0 g/dL    Albumin 3.6 3.5 - 5.0 g/dL    Alkaline Phosphatase 160 (H) 45 - 120 U/L    AST 43 (H) 0 - 40 U/L    ALT 54 (H) 0 - 45 U/L   INR    Collection Time: 06/28/22  1:33 PM   Result Value Ref Range    INR 0.93 0.85 - 1.15   Troponin I (now)    Collection Time: 06/28/22  1:33 PM   Result Value Ref Range    Troponin I <0.01 0.00 - 0.29 ng/mL   Alcohol level blood    Collection Time: 06/28/22  1:33 PM   Result Value Ref Range    Alcohol, Blood <10 None detected mg/dL   Adult Type and Screen    Collection Time: 06/28/22  1:33 PM   Result Value Ref Range    ABO/RH(D) A POS     Antibody Screen Negative Negative    SPECIMEN EXPIRATION DATE [id number]    Magnesium    Collection Time: 06/28/22  1:33 PM   Result Value Ref Range    Magnesium 1.8 1.8 - 2.6 mg/dL   Head CT w/o contrast    Collection Time: 06/28/22  1:58 PM   Result Value Ref Range    Radiologist flags Acute cervical spine fracture (AA)    Cervical spine CT w/o contrast    Collection Time: 06/28/22  1:59 PM    Result Value Ref Range    Radiologist flags Acute cervical spine fracture (AA)    Lactic acid whole blood    Collection Time: 06/28/22  2:51 PM   Result Value Ref Range    Lactic Acid 1.0 0.7 - 2.0 mmol/L   Occult blood stool    Collection Time: 06/28/22  3:46 PM   Result Value Ref Range    Occult Blood Positive (A) Negative   Asymptomatic COVID-19 Virus (Coronavirus) by PCR Nasopharyngeal    Collection Time: 06/28/22  6:34 PM    Specimen: Nasopharyngeal; Swab   Result Value Ref Range    SARS CoV2 PCR Negative Negative   Extra Green Top (Lithium Heparin) Tube    Collection Time: 06/28/22  7:02 PM   Result Value Ref Range    Hold Specimen JIC    Hemoglobin    Collection Time: 06/28/22  7:38 PM   Result Value Ref Range    Hemoglobin 10.6 (L) 11.7 - 15.7 g/dL         RADIOLOGY:  Reviewed all pertinent imaging. Please see official radiology report.  MR CERVICAL SPINE W/O CONTRAST   Final Result   IMPRESSION:   1.  Right C3 transverse process fracture is visualized with mild regional edema. The fracture is better seen on CT earlier today. No additional fractures are visualized.   2.  No evidence of any posterior tension band injury.   3.  Postsurgical changes as above with mild spinal stenosis at C3-C4, C6-C7.      CTA Neck with Contrast   Final Result   IMPRESSION:       No evidence of vertebral artery injury associated with the nondisplaced C3 right transverse process fracture.      50% stenosis right vertebral artery V1 segment secondary to atherosclerotic plaque.      Mild left carotid stenosis.      Probable 2 mm nodule left thyroid lobe, partially obscured by hardware artifact. This is of doubtful clinical significance in a patient of this age.      Cervical spine CT w/o contrast   Final Result   Abnormal   IMPRESSION:   HEAD CT:   1.  No CT evidence for acute intracranial process.   2.  Brain atrophy and presumed chronic microvascular ischemic changes as above.      CERVICAL SPINE CT:   1.  Nondisplaced fracture  of the right transverse process at C3. This involves the right transverse foramen. CTA of the neck is advised to exclude associated vascular injury at this level.   2.  No additional fracture or posttraumatic subluxation.   3.  Multilevel cervical fusion without hardware complication.   4.  Multilevel cervical spondylosis as above.      [Critical Result: Acute cervical spine fracture]      Finding was identified on 6/28/2022 2:11 PM.       JOSUÉ Frank was contacted by me on 6/28/2022 2:25 PM and verbalized understanding of the critical result.       Head CT w/o contrast   Final Result   Abnormal   IMPRESSION:   HEAD CT:   1.  No CT evidence for acute intracranial process.   2.  Brain atrophy and presumed chronic microvascular ischemic changes as above.      CERVICAL SPINE CT:   1.  Nondisplaced fracture of the right transverse process at C3. This involves the right transverse foramen. CTA of the neck is advised to exclude associated vascular injury at this level.   2.  No additional fracture or posttraumatic subluxation.   3.  Multilevel cervical fusion without hardware complication.   4.  Multilevel cervical spondylosis as above.      [Critical Result: Acute cervical spine fracture]      Finding was identified on 6/28/2022 2:11 PM.       JOSUÉ Frank was contacted by me on 6/28/2022 2:25 PM and verbalized understanding of the critical result.         EKG:    Performed at: 13:25    Impression: sinus rhythm with marked sinus arrhythmia, nonspecific ST and T wave abnormality    Rate: 79  Rhythm: sinus rhythm with marked sinus arrhythmia  Axis: -17  NJ Interval: 118  QRS Interval: 72  QTc Interval: 454  ST Changes: nonspecific ST and T wave abnormality. No acute ST elevations or depressions.  Comparison: no significant changes from 6/21/2019    I have independently reviewed and interpreted the EKG(s) documented above. Reviewed with Dr. Ji VIRGEN, Shania Spaulding, am serving as a scribe to document services personally  performed by Kaylah Frank PA-C based on my observation and the provider's statements to me. I, Kaylah Frank PA-C attest that Shania Spaulding is acting in a scribe capacity, has observed my performance of the services and has documented them in accordance with my direction.    Kaylah Frank PA-C  Emergency Medicine  [facility]  6/28/2022     Kaylah Frank PA-C  06/28/22 2034

## 2022-06-28 NOTE — ED TRIAGE NOTES
Triage Assessment     Row Name 06/28/22 [id number]       Triage Assessment (Adult)    Airway WDL WDL       Respiratory WDL    Respiratory WDL WDL       Skin Circulation/Temperature WDL    Skin Circulation/Temperature WDL WDL       Cardiac WDL    Cardiac WDL X  sbp 80's       Peripheral/Neurovascular WDL    Peripheral Neurovascular WDL WDL            Presents from home via EMS with c/o dark, tarry stools x 2 beginning last pm. Recent falls (last Sunday) with pain to left side of neck (brusing noted), right hip, low back pain. SBP 80's. Pt is AAO x 3. When pt fell Sunday she woke up on the floor and does not remember falling. Noted bruising to left arm as well.

## 2022-06-28 NOTE — H&P
[facility] [place] [facility] Admission History and Physical    Rachel Schneider   [facility] [address]   [place] [facility] [id number]  : 1965  Admission Date/Time: 2022 12:55 PM    Primary Care Provider / Referring Physician: Kika Leblanc  [facility] Attending Physician:  Dada Rubio DO     Assessment:     Principal Problem:    Melena  Active Problems:    C3 cervical fracture (H)    ARJUN (acute kidney injury) (H)    Hyperkalemia    Hyponatremia    Abnormal LFTs    Asthma    Hepatic steatosis    Chronic low back pain    Depression    Fibromyalgia    HTN (hypertension)      Plan:    56-year-old female with history of anxiety/depression, mood disorder, chronic low back pain, fibromyalgia, hypertension, hepatic steatosis, asthma who presents with melena and C3 transverse process fracture after fall.      Melena: Not associated with abdominal pain.  Reportedly was hypotensive prior to admission however blood pressures have been stable here. Notes is mild likely acute blood loss anemia with Hgb 12.5 on admission from Hgb of 14 on last check 6/3/22  --Hold home Celebrex and lisinopril  -- Start IV PPI  -- Clear liquid diet  -- Trend hemoglobin every 12 hours, transfuse for hemoglobin less than 7  -- GI consult for the a.m.      Fall with right C3 transverse process fracture: Patient endorses syncopal event . Likely related to relative hypotension, possible contribution of acute blood loss  CTA negative for vertebral artery injury  CT head negative for any acute process.  CT C-spine shows nondisplaced fracture right transverse process at C3.  --Neurosurgery consulted and recommend cervical collar at all times, obtain MRI C-spine without contrast  -- PT/OT  -- pain control with scheduled tylenol and oxycodone as needed      Syncope: Patient endorses syncopal event . Likely related to relative hypotension, possible contribution of acute blood loss  EKG with nonspecific findings  Head CT negative  CTA  shows  50% stenosis right vertebral artery V1 segment secondary to atherosclerotic plaque and mild left carotid stenosis.  -- check orthostatics   -- monitor on telemetry for any arrhyhtmia   -- Check a.m. lipids  -- Start Zocor 20mg qhs   -- hold home lisinopril for now  -- IVF support      ARJUN: Patient presents with creatinine of 1.13 up from baseline of 0.7  -- Hold home lisinopril  -- IV fluids overnight  -- Trend for improvement in the a.m.      Mild hyperkalemia and hyponatremia: Suspect related to decreased distal sodium delivery, ARJUN, lisinopril use  --IV hydration with normal saline overnight, trend for improvement in the a.m.  -- Hold lisinopril as above      History of hepatic steatosis: Noted is mildly abnormal AST ALT and alk phos with normal T bili and no abdominal pain, lactic acid negative  --Hold off repeat abdominal imaging for now        Chronic radicular low back pain, fibromyalgia, history of anxiety/depression:  --Holding home Celebrex as above  -- Continue home Cymbalta      History of asthma: Continue home albuterol            DVT PPX: SCD    Code status:  Full Code      Dada Rubio D.O.          _____________________________________________________________  CHIEF COMPLAINT:    Dark tarry stools    HPI:  56-year-old female with history of anxiety/depression, mood disorder, chronic low back pain, fibromyalgia, hypertension, hepatic steatosis, asthma who presents with melena and C3 transverse process fracture after fall.  Patient endorses history of dark tarry stools beginning last evening.  Also noted is recent syncopal event 6/26/22 with noted left-sided neck pain and bruising.  Patient does not remember falling.  She states she was supposed to have hip surgery and that this was canceled due to hypertension.  She recently increased her home lisinopril and has since had lightheadedness.  ROS+ for GERD  Remainder of ROS negative. Denies any other exacerbating or improving  factors.          ALLERGIES/SENSITIVITIES:   Allergies   Allergen Reactions     Gabapentin Unknown       (Not in a hospital admission)      Past Medical History:   Diagnosis Date     Asthma      Chronic low back pain      Depression      Fibromyalgia      Hepatic steatosis      HTN (hypertension)      MRSA infection      Obesity        Past Surgical History:   Procedure Laterality Date     EXCISE BREAST CYST/FIBROADENOMA/TUMOR/DUCT LESION/NIPPLE LESION/AREOLAR LESION      Description: Breast Surgery Lumpectomy;  Recorded: 07/09/2014;     HC REMOVAL GALLBLADDER      Description: Cholecystectomy;  Recorded: 07/09/2014;     HC REMOVAL OF TONSILS,<11 Y/O      Description: Tonsillectomy;  Recorded: 07/09/2014;     [facility] CERV SPINE FUSN,ANTER,BELOW C2      Description: Cervical Vertebral Fusion;  Recorded: 07/09/2014;     [facility] GARY W/O FACETEC FORAMOT/DSKC 1/2 VRT SEG, CERVICAL      Description: Laminectomy Lumbar;  Recorded: 07/09/2014;     [facility] TOTAL ABDOM HYSTERECTOMY      Description: Hysterectomy;  Recorded: 07/09/2014;       REVIEW OF SYSTEMS:   Constitutional: no fever, chills, or sweats  Eyes: No visual disturbance or irritation  ENT: No nose or throat congestion or pain  Respiratory: No wheezes, cough, shortness of breath, or pain with breathing  Cardiovascular: No chest pain or palpitations  Gastrointestinal: No nausea, vomiting, diarrhea, dyspepsia, or pain  Genitourinary: No urgency, frequency, or dysuria  Integument/breast: as above  Hematologic/lymphatic: as above  Musculoskeletal: as above  Neurological: No headache, arm or leg numbness or weakness,   Psychiatric: No anxiety, or depression, or hallucinations  Endocrine: No unusual fatigue, appetite disturbance, sleep disturbance, or unusual weight loss or gain  Allergic/Immunologic: No hives, allergic swelling or wheeze or rhinitis  All other systems on reveiw are negative.    Social History     Socioeconomic History     Marital status:      Spouse name:  "Not on file     Number of children: Not on file     Years of education: Not on file     Highest education level: Not on file   Occupational History     Not on file   Tobacco Use     Smoking status: Former Smoker     Types: Cigarettes     Quit date: 2009     Years since quittin.7     Smokeless tobacco: Not on file   Substance and Sexual Activity     Alcohol use: Yes     Alcohol/week: 3.3 - 4.2 standard drinks     Types: 4 - 5 drink(s) per week     Drug use: Not on file     Sexual activity: Not on file   Other Topics Concern     Not on file   Social History Narrative     Not on file     Social Determinants of Health     Financial Resource Strain: Not on file   Food Insecurity: Not on file   Transportation Needs: Not on file   Physical Activity: Not on file   Stress: Not on file   Social Connections: Not on file   Intimate Partner Violence: Not on file   Housing Stability: Not on file          Family History   Adopted: Yes   Problem Relation Age of Onset     Breast Cancer Mother        PHYSICAL EXAM:  General Appearance: In no acute distress  /89   Pulse 108   Temp 97.8  F (36.6  C) (Oral)   Resp 21   Ht 1.6 m (5' 3\")   Wt 63.9 kg (140 lb 14 oz)   SpO2 100%   BMI 24.95 kg/m    EYES: Clear, without inflammation   HEENT: C-collar in place  RESPIRATORY: Respirations nonlabored  CARDIOVASCULAR:  No le edema bilat.  ABDOMEN: soft and non-tender  RECTAL: deferred  GENITOURINARY: deferred  NEUROLOGIC: No focal arm or leg  weakness, speech is clear        Labs Reviewed:   Recent Results (from the past 24 hour(s))   ECG 12-LEAD WITH MUSE (LHE)    Collection Time: 22  1:25 PM   Result Value Ref Range    Systolic Blood Pressure  mmHg    Diastolic Blood Pressure  mmHg    Ventricular Rate 79 BPM    Atrial Rate 79 BPM    SC Interval 118 ms    QRS Duration 72 ms     ms    QTc 454 ms    P Axis 65 degrees    R AXIS -17 degrees    T Axis 73 degrees    Interpretation ECG       Sinus rhythm with marked " sinus arrhythmia  Nonspecific ST and T wave abnormality  Abnormal ECG  When compared with ECG of 21-JUN-2019 11:28,  Nonspecific T wave abnormality, worse in Lateral leads  Confirmed by SEE ED PROVIDER NOTE FOR, ECG INTERPRETATION ([id number]),  LUZMA RAY ([id number]) on 6/28/2022 2:12:04 PM     CBC (+ platelets, no diff)    Collection Time: 06/28/22  1:33 PM   Result Value Ref Range    WBC Count 6.2 4.0 - 11.0 10e3/uL    RBC Count 3.22 (L) 3.80 - 5.20 10e6/uL    Hemoglobin 12.5 11.7 - 15.7 g/dL    Hematocrit 36.6 35.0 - 47.0 %     (H) 78 - 100 fL    MCH 38.8 (H) 26.5 - 33.0 pg    MCHC 34.2 31.5 - 36.5 g/dL    RDW 13.3 10.0 - 15.0 %    Platelet Count 202 150 - 450 10e3/uL   Basic metabolic panel    Collection Time: 06/28/22  1:33 PM   Result Value Ref Range    Sodium 132 (L) 136 - 145 mmol/L    Potassium 5.2 (H) 3.5 - 5.0 mmol/L    Chloride 96 (L) 98 - 107 mmol/L    Carbon Dioxide (CO2) 25 22 - 31 mmol/L    Anion Gap 11 5 - 18 mmol/L    Urea Nitrogen 38 (H) 8 - 22 mg/dL    Creatinine 1.13 (H) 0.60 - 1.10 mg/dL    Calcium 9.8 8.5 - 10.5 mg/dL    Glucose 97 70 - 125 mg/dL    GFR Estimate 56 (L) >60 mL/min/1.73m2   Hepatic function panel    Collection Time: 06/28/22  1:33 PM   Result Value Ref Range    Bilirubin Total 0.7 0.0 - 1.0 mg/dL    Bilirubin Direct 0.3 <=0.5 mg/dL    Protein Total 6.7 6.0 - 8.0 g/dL    Albumin 3.6 3.5 - 5.0 g/dL    Alkaline Phosphatase 160 (H) 45 - 120 U/L    AST 43 (H) 0 - 40 U/L    ALT 54 (H) 0 - 45 U/L   INR    Collection Time: 06/28/22  1:33 PM   Result Value Ref Range    INR 0.93 0.85 - 1.15   Troponin I (now)    Collection Time: 06/28/22  1:33 PM   Result Value Ref Range    Troponin I <0.01 0.00 - 0.29 ng/mL   Alcohol level blood    Collection Time: 06/28/22  1:33 PM   Result Value Ref Range    Alcohol, Blood <10 None detected mg/dL   Adult Type and Screen    Collection Time: 06/28/22  1:33 PM   Result Value Ref Range    ABO/RH(D) A POS     Antibody Screen Negative Negative     SPECIMEN EXPIRATION DATE [id number]    Head CT w/o contrast    Collection Time: 06/28/22  1:58 PM   Result Value Ref Range    Radiologist flags Acute cervical spine fracture (AA)    Cervical spine CT w/o contrast    Collection Time: 06/28/22  1:59 PM   Result Value Ref Range    Radiologist flags Acute cervical spine fracture (AA)    Lactic acid whole blood    Collection Time: 06/28/22  2:51 PM   Result Value Ref Range    Lactic Acid 1.0 0.7 - 2.0 mmol/L   Occult blood stool    Collection Time: 06/28/22  3:46 PM   Result Value Ref Range    Occult Blood Positive (A) Negative

## 2022-06-28 NOTE — ED PROVIDER NOTES
"Emergency Department Midlevel Supervisory Note     I personally saw the patient and performed a substantive portion of the visit including all aspects of the medical decision making.    ED Course:  2:40 PM Kaylah Frank PA-C staffed patient with me. I agree with their assessment and plan of management, and I will see the patient.  3:18 PM I met with the patient to introduce myself, gather additional history, perform my initial exam, and discuss the plan.     Brief HPI:     Rachel Schneider is a 56 year old female who presents for evaluation of dark stools.     Patient states that last night her stool was \"tarry\" and dark with a diarrhea consistency. No associated abdominal pain. She is not on anticoagulants and does not take aspirin daily. She does endorse drinking 1-2 drinks every couple of days and denies any history of alcoholism or alcohol withdrawal.     Patient reports that she has been feeling overall unwell the whole week. She states that she has experienced \"bad\" heart burn but no chest pain. She also mentions some difficulty swallowing secondary to pain.     Patient also states that on 6/26 (~2 days ago) she had a syncopal episode but does not remember the incident and she does not recall the incident.She has developed bruising on the left anterior neck and scattered across the left arm. Of note, patient does mention she bruises easily. She also has lower left back pain that she has been treating with a heating pad with minimal relief.     Of note, patient has chronic right hip pain which was scheduled to be replaced on 6/13 (~15 days ago) but was not performed due to patients high blood pressure. Since she has been placed on blood pressure medications. Patient also denies any history of GI bleeds.    I, Roxie Rahman am serving as a scribe to document services personally performed by Jae Workman MD, based on my observation and the provider's statements to me. I, Jae Workman MD attest that Roxie" "Josiah is acting in a scribe capacity, has observed my performance of the services and has documented them in accordance with my direction.     Brief Physical Exam: BP (!) 143/67 (BP Location: Left arm, Patient Position: Semi-Yanez's, Cuff Size: Adult Regular)   Pulse 72   Temp 97.7  F (36.5  C) (Oral)   Resp 18   Ht 1.6 m (5' 3\")   Wt 63.9 kg (140 lb 14 oz)   SpO2 99%   BMI 24.95 kg/m    Constitutional:  Alert, in no acute distress  EYES: Conjunctivae clear  HENT:  Atraumatic, normocephalic  Respiratory:  Respirations even, unlabored, in no acute respiratory distress  Cardiovascular:  Regular rate and rhythm, good peripheral perfusion  GI: Soft, nondistended, nontender, no palpable masses, no rebound, no guarding   Musculoskeletal:  No edema. No cyanosis. Range of motion major extremities intact.    Integument: Warm, Dry, No erythema, No rash.   Neurologic:  Alert & oriented, no focal deficits noted  Psych: Normal mood and affect     MDM:  Patient presented with heme positive black tarry stools and episode of syncope.  She has a transverse process fracture in her cervical spine.  Patient will be admitted to the hospital for further care.  Will follow neurosurgery advised on transverse process fracture without instability.      Jae Workman MD  [facility] EMERGENCY DEPARTMENT  [address] [place] [id number]  [phone]     Jae Workman MD  07/08/22 1501    "

## 2022-06-29 ENCOUNTER — APPOINTMENT (OUTPATIENT)
Dept: RADIOLOGY | Facility: HOSPITAL | Age: 57
DRG: 377 | End: 2022-06-29
Attending: PHYSICIAN ASSISTANT
Payer: COMMERCIAL

## 2022-06-29 ENCOUNTER — APPOINTMENT (OUTPATIENT)
Dept: OCCUPATIONAL THERAPY | Facility: HOSPITAL | Age: 57
DRG: 377 | End: 2022-06-29
Attending: INTERNAL MEDICINE
Payer: COMMERCIAL

## 2022-06-29 ENCOUNTER — ANESTHESIA (OUTPATIENT)
Dept: SURGERY | Facility: HOSPITAL | Age: 57
DRG: 377 | End: 2022-06-29
Payer: COMMERCIAL

## 2022-06-29 ENCOUNTER — ANESTHESIA EVENT (OUTPATIENT)
Dept: SURGERY | Facility: HOSPITAL | Age: 57
DRG: 377 | End: 2022-06-29
Payer: COMMERCIAL

## 2022-06-29 ENCOUNTER — DOCUMENTATION ONLY (OUTPATIENT)
Dept: ORTHOPEDICS | Facility: CLINIC | Age: 57
End: 2022-06-29

## 2022-06-29 LAB
ALBUMIN SERPL-MCNC: 2.8 G/DL (ref 3.5–5)
ALP SERPL-CCNC: 229 U/L (ref 45–120)
ALT SERPL W P-5'-P-CCNC: 99 U/L (ref 0–45)
ANION GAP SERPL CALCULATED.3IONS-SCNC: 8 MMOL/L (ref 5–18)
ANION GAP SERPL CALCULATED.3IONS-SCNC: 8 MMOL/L (ref 5–18)
APAP SERPL-MCNC: <3 UG/ML (ref 10–20)
AST SERPL W P-5'-P-CCNC: 205 U/L (ref 0–40)
BILIRUB SERPL-MCNC: 0.4 MG/DL (ref 0–1)
BUN SERPL-MCNC: 25 MG/DL (ref 8–22)
BUN SERPL-MCNC: 25 MG/DL (ref 8–22)
CALCIUM SERPL-MCNC: 8.2 MG/DL (ref 8.5–10.5)
CALCIUM SERPL-MCNC: 8.2 MG/DL (ref 8.5–10.5)
CHLORIDE BLD-SCNC: 106 MMOL/L (ref 98–107)
CHLORIDE BLD-SCNC: 106 MMOL/L (ref 98–107)
CHOLEST SERPL-MCNC: 161 MG/DL
CO2 SERPL-SCNC: 21 MMOL/L (ref 22–31)
CO2 SERPL-SCNC: 21 MMOL/L (ref 22–31)
CREAT SERPL-MCNC: 0.73 MG/DL (ref 0.6–1.1)
CREAT SERPL-MCNC: 0.73 MG/DL (ref 0.6–1.1)
ERYTHROCYTE [DISTWIDTH] IN BLOOD BY AUTOMATED COUNT: 13.6 % (ref 10–15)
FASTING STATUS PATIENT QL REPORTED: NORMAL
FOLATE SERPL-MCNC: 4.8 NG/ML (ref 4.6–34.8)
GFR SERPL CREATININE-BSD FRML MDRD: >90 ML/MIN/1.73M2
GFR SERPL CREATININE-BSD FRML MDRD: >90 ML/MIN/1.73M2
GLUCOSE BLD-MCNC: 74 MG/DL (ref 70–125)
GLUCOSE BLD-MCNC: 74 MG/DL (ref 70–125)
HAV IGM SERPL QL IA: NONREACTIVE
HBV CORE IGM SERPL QL IA: NONREACTIVE
HBV SURFACE AG SERPL QL IA: NONREACTIVE
HCT VFR BLD AUTO: 30.4 % (ref 35–47)
HCV AB SERPL QL IA: NONREACTIVE
HDLC SERPL-MCNC: 84 MG/DL
HGB BLD-MCNC: 10.1 G/DL (ref 11.7–15.7)
HGB BLD-MCNC: 10.1 G/DL (ref 11.7–15.7)
HGB BLD-MCNC: 10.3 G/DL (ref 11.7–15.7)
LDLC SERPL CALC-MCNC: 63 MG/DL
MCH RBC QN AUTO: 39.2 PG (ref 26.5–33)
MCHC RBC AUTO-ENTMCNC: 33.4 G/DL (ref 31.5–36.5)
MCV RBC AUTO: 119 FL (ref 78–100)
PLATELET # BLD AUTO: 142 10E3/UL (ref 150–450)
POTASSIUM BLD-SCNC: 4.3 MMOL/L (ref 3.5–5)
POTASSIUM BLD-SCNC: 4.3 MMOL/L (ref 3.5–5)
PROT SERPL-MCNC: 5.1 G/DL (ref 6–8)
RBC # BLD AUTO: 2.55 10E6/UL (ref 3.8–5.2)
SODIUM SERPL-SCNC: 135 MMOL/L (ref 136–145)
SODIUM SERPL-SCNC: 135 MMOL/L (ref 136–145)
TRIGL SERPL-MCNC: 69 MG/DL
UPPER GI ENDOSCOPY: NORMAL
VIT B12 SERPL-MCNC: 399 PG/ML (ref 232–1245)
WBC # BLD AUTO: 4.1 10E3/UL (ref 4–11)

## 2022-06-29 PROCEDURE — 258N000003 HC RX IP 258 OP 636: Performed by: INTERNAL MEDICINE

## 2022-06-29 PROCEDURE — 999N000157 HC STATISTIC RCP TIME EA 10 MIN

## 2022-06-29 PROCEDURE — 94640 AIRWAY INHALATION TREATMENT: CPT

## 2022-06-29 PROCEDURE — 72040 X-RAY EXAM NECK SPINE 2-3 VW: CPT

## 2022-06-29 PROCEDURE — 250N000011 HC RX IP 250 OP 636: Performed by: INTERNAL MEDICINE

## 2022-06-29 PROCEDURE — 250N000009 HC RX 250: Performed by: ANESTHESIOLOGY

## 2022-06-29 PROCEDURE — 999N000141 HC STATISTIC PRE-PROCEDURE NURSING ASSESSMENT: Performed by: INTERNAL MEDICINE

## 2022-06-29 PROCEDURE — 0W3P8ZZ CONTROL BLEEDING IN GASTROINTESTINAL TRACT, VIA NATURAL OR ARTIFICIAL OPENING ENDOSCOPIC: ICD-10-PCS | Performed by: INTERNAL MEDICINE

## 2022-06-29 PROCEDURE — 0DB68ZX EXCISION OF STOMACH, VIA NATURAL OR ARTIFICIAL OPENING ENDOSCOPIC, DIAGNOSTIC: ICD-10-PCS | Performed by: INTERNAL MEDICINE

## 2022-06-29 PROCEDURE — 370N000017 HC ANESTHESIA TECHNICAL FEE, PER MIN: Performed by: INTERNAL MEDICINE

## 2022-06-29 PROCEDURE — 82607 VITAMIN B-12: CPT | Performed by: INTERNAL MEDICINE

## 2022-06-29 PROCEDURE — 96376 TX/PRO/DX INJ SAME DRUG ADON: CPT

## 2022-06-29 PROCEDURE — 80074 ACUTE HEPATITIS PANEL: CPT | Performed by: INTERNAL MEDICINE

## 2022-06-29 PROCEDURE — 80061 LIPID PANEL: CPT | Performed by: INTERNAL MEDICINE

## 2022-06-29 PROCEDURE — 272N000001 HC OR GENERAL SUPPLY STERILE: Performed by: INTERNAL MEDICINE

## 2022-06-29 PROCEDURE — 85027 COMPLETE CBC AUTOMATED: CPT | Performed by: INTERNAL MEDICINE

## 2022-06-29 PROCEDURE — 88305 TISSUE EXAM BY PATHOLOGIST: CPT | Mod: TC | Performed by: INTERNAL MEDICINE

## 2022-06-29 PROCEDURE — 85018 HEMOGLOBIN: CPT | Performed by: INTERNAL MEDICINE

## 2022-06-29 PROCEDURE — 250N000009 HC RX 250: Performed by: INTERNAL MEDICINE

## 2022-06-29 PROCEDURE — 80143 DRUG ASSAY ACETAMINOPHEN: CPT | Performed by: INTERNAL MEDICINE

## 2022-06-29 PROCEDURE — 99233 SBSQ HOSP IP/OBS HIGH 50: CPT | Performed by: INTERNAL MEDICINE

## 2022-06-29 PROCEDURE — 36415 COLL VENOUS BLD VENIPUNCTURE: CPT | Performed by: INTERNAL MEDICINE

## 2022-06-29 PROCEDURE — G0463 HOSPITAL OUTPT CLINIC VISIT: HCPCS

## 2022-06-29 PROCEDURE — 258N000003 HC RX IP 258 OP 636: Performed by: ANESTHESIOLOGY

## 2022-06-29 PROCEDURE — 0DB58ZX EXCISION OF ESOPHAGUS, VIA NATURAL OR ARTIFICIAL OPENING ENDOSCOPIC, DIAGNOSTIC: ICD-10-PCS | Performed by: INTERNAL MEDICINE

## 2022-06-29 PROCEDURE — 99222 1ST HOSP IP/OBS MODERATE 55: CPT | Performed by: PHYSICIAN ASSISTANT

## 2022-06-29 PROCEDURE — 97165 OT EVAL LOW COMPLEX 30 MIN: CPT | Mod: GO

## 2022-06-29 PROCEDURE — 80053 COMPREHEN METABOLIC PANEL: CPT | Performed by: INTERNAL MEDICINE

## 2022-06-29 PROCEDURE — 360N000075 HC SURGERY LEVEL 2, PER MIN: Performed by: INTERNAL MEDICINE

## 2022-06-29 PROCEDURE — 82746 ASSAY OF FOLIC ACID SERUM: CPT | Performed by: INTERNAL MEDICINE

## 2022-06-29 PROCEDURE — 96375 TX/PRO/DX INJ NEW DRUG ADDON: CPT

## 2022-06-29 PROCEDURE — 250N000011 HC RX IP 250 OP 636: Performed by: ANESTHESIOLOGY

## 2022-06-29 PROCEDURE — G0378 HOSPITAL OBSERVATION PER HR: HCPCS

## 2022-06-29 PROCEDURE — 250N000013 HC RX MED GY IP 250 OP 250 PS 637: Performed by: INTERNAL MEDICINE

## 2022-06-29 PROCEDURE — C9113 INJ PANTOPRAZOLE SODIUM, VIA: HCPCS | Performed by: INTERNAL MEDICINE

## 2022-06-29 PROCEDURE — 97535 SELF CARE MNGMENT TRAINING: CPT | Mod: GO

## 2022-06-29 RX ORDER — PROPOFOL 10 MG/ML
INJECTION, EMULSION INTRAVENOUS CONTINUOUS PRN
Status: DISCONTINUED | OUTPATIENT
Start: 2022-06-29 | End: 2022-06-29

## 2022-06-29 RX ORDER — IPRATROPIUM BROMIDE AND ALBUTEROL SULFATE 2.5; .5 MG/3ML; MG/3ML
3 SOLUTION RESPIRATORY (INHALATION) EVERY 6 HOURS PRN
Status: DISCONTINUED | OUTPATIENT
Start: 2022-06-29 | End: 2022-07-01 | Stop reason: HOSPADM

## 2022-06-29 RX ORDER — PROPOFOL 10 MG/ML
INJECTION, EMULSION INTRAVENOUS PRN
Status: DISCONTINUED | OUTPATIENT
Start: 2022-06-29 | End: 2022-06-29

## 2022-06-29 RX ORDER — NALOXONE HYDROCHLORIDE 0.4 MG/ML
0.2 INJECTION, SOLUTION INTRAMUSCULAR; INTRAVENOUS; SUBCUTANEOUS
Status: DISCONTINUED | OUTPATIENT
Start: 2022-06-29 | End: 2022-07-01 | Stop reason: HOSPADM

## 2022-06-29 RX ORDER — SODIUM CHLORIDE, SODIUM LACTATE, POTASSIUM CHLORIDE, CALCIUM CHLORIDE 600; 310; 30; 20 MG/100ML; MG/100ML; MG/100ML; MG/100ML
INJECTION, SOLUTION INTRAVENOUS CONTINUOUS
Status: DISCONTINUED | OUTPATIENT
Start: 2022-06-29 | End: 2022-06-29 | Stop reason: HOSPADM

## 2022-06-29 RX ORDER — FENTANYL CITRATE 50 UG/ML
25 INJECTION, SOLUTION INTRAMUSCULAR; INTRAVENOUS EVERY 5 MIN PRN
Status: DISCONTINUED | OUTPATIENT
Start: 2022-06-29 | End: 2022-06-29 | Stop reason: HOSPADM

## 2022-06-29 RX ORDER — OXYCODONE HYDROCHLORIDE 5 MG/1
5 TABLET ORAL EVERY 4 HOURS PRN
Status: DISCONTINUED | OUTPATIENT
Start: 2022-06-29 | End: 2022-06-29 | Stop reason: HOSPADM

## 2022-06-29 RX ORDER — LIDOCAINE 40 MG/G
CREAM TOPICAL
Status: DISCONTINUED | OUTPATIENT
Start: 2022-06-29 | End: 2022-06-29 | Stop reason: HOSPADM

## 2022-06-29 RX ORDER — ONDANSETRON 2 MG/ML
4 INJECTION INTRAMUSCULAR; INTRAVENOUS EVERY 6 HOURS PRN
Status: CANCELLED | OUTPATIENT
Start: 2022-06-29

## 2022-06-29 RX ORDER — LIDOCAINE HYDROCHLORIDE 10 MG/ML
INJECTION, SOLUTION INFILTRATION; PERINEURAL PRN
Status: DISCONTINUED | OUTPATIENT
Start: 2022-06-29 | End: 2022-06-29

## 2022-06-29 RX ORDER — ONDANSETRON 4 MG/1
4 TABLET, ORALLY DISINTEGRATING ORAL EVERY 6 HOURS PRN
Status: CANCELLED | OUTPATIENT
Start: 2022-06-29

## 2022-06-29 RX ORDER — NALOXONE HYDROCHLORIDE 0.4 MG/ML
0.4 INJECTION, SOLUTION INTRAMUSCULAR; INTRAVENOUS; SUBCUTANEOUS
Status: DISCONTINUED | OUTPATIENT
Start: 2022-06-29 | End: 2022-07-01 | Stop reason: HOSPADM

## 2022-06-29 RX ORDER — SODIUM CHLORIDE, SODIUM LACTATE, POTASSIUM CHLORIDE, CALCIUM CHLORIDE 600; 310; 30; 20 MG/100ML; MG/100ML; MG/100ML; MG/100ML
INJECTION, SOLUTION INTRAVENOUS CONTINUOUS PRN
Status: DISCONTINUED | OUTPATIENT
Start: 2022-06-29 | End: 2022-06-29

## 2022-06-29 RX ORDER — ONDANSETRON 4 MG/1
4 TABLET, ORALLY DISINTEGRATING ORAL EVERY 30 MIN PRN
Status: DISCONTINUED | OUTPATIENT
Start: 2022-06-29 | End: 2022-06-29 | Stop reason: HOSPADM

## 2022-06-29 RX ORDER — GLYCOPYRROLATE 0.2 MG/ML
INJECTION, SOLUTION INTRAMUSCULAR; INTRAVENOUS PRN
Status: DISCONTINUED | OUTPATIENT
Start: 2022-06-29 | End: 2022-06-29

## 2022-06-29 RX ORDER — FLUMAZENIL 0.1 MG/ML
0.2 INJECTION, SOLUTION INTRAVENOUS
Status: CANCELLED | OUTPATIENT
Start: 2022-06-29 | End: 2022-06-30

## 2022-06-29 RX ORDER — IPRATROPIUM BROMIDE AND ALBUTEROL SULFATE 2.5; .5 MG/3ML; MG/3ML
3 SOLUTION RESPIRATORY (INHALATION)
Status: DISCONTINUED | OUTPATIENT
Start: 2022-06-29 | End: 2022-06-29

## 2022-06-29 RX ORDER — FENTANYL CITRATE 50 UG/ML
25 INJECTION, SOLUTION INTRAMUSCULAR; INTRAVENOUS
Status: DISCONTINUED | OUTPATIENT
Start: 2022-06-29 | End: 2022-06-29 | Stop reason: HOSPADM

## 2022-06-29 RX ORDER — ONDANSETRON 2 MG/ML
4 INJECTION INTRAMUSCULAR; INTRAVENOUS EVERY 30 MIN PRN
Status: DISCONTINUED | OUTPATIENT
Start: 2022-06-29 | End: 2022-06-29 | Stop reason: HOSPADM

## 2022-06-29 RX ADMIN — PROPOFOL 40 MG: 10 INJECTION, EMULSION INTRAVENOUS at 12:35

## 2022-06-29 RX ADMIN — ACETAMINOPHEN 975 MG: 325 TABLET, FILM COATED ORAL at 13:35

## 2022-06-29 RX ADMIN — SODIUM CHLORIDE: 9 INJECTION, SOLUTION INTRAVENOUS at 05:57

## 2022-06-29 RX ADMIN — PANTOPRAZOLE SODIUM 40 MG: 40 INJECTION, POWDER, FOR SOLUTION INTRAVENOUS at 17:03

## 2022-06-29 RX ADMIN — OXYCODONE HYDROCHLORIDE 5 MG: 5 TABLET ORAL at 21:00

## 2022-06-29 RX ADMIN — OXYCODONE HYDROCHLORIDE 5 MG: 5 TABLET ORAL at 08:08

## 2022-06-29 RX ADMIN — PROPOFOL 200 MCG/KG/MIN: 10 INJECTION, EMULSION INTRAVENOUS at 12:24

## 2022-06-29 RX ADMIN — ONDANSETRON 4 MG: 4 TABLET, ORALLY DISINTEGRATING ORAL at 13:35

## 2022-06-29 RX ADMIN — DULOXETINE HYDROCHLORIDE 120 MG: 60 CAPSULE, DELAYED RELEASE PELLETS ORAL at 21:00

## 2022-06-29 RX ADMIN — OXYCODONE HYDROCHLORIDE 5 MG: 5 TABLET ORAL at 16:33

## 2022-06-29 RX ADMIN — PROPOFOL 40 MG: 10 INJECTION, EMULSION INTRAVENOUS at 12:26

## 2022-06-29 RX ADMIN — HYDRALAZINE HYDROCHLORIDE 10 MG: 20 INJECTION INTRAMUSCULAR; INTRAVENOUS at 16:31

## 2022-06-29 RX ADMIN — IPRATROPIUM BROMIDE AND ALBUTEROL SULFATE 3 ML: 2.5; .5 SOLUTION RESPIRATORY (INHALATION) at 16:36

## 2022-06-29 RX ADMIN — GLYCOPYRROLATE 0.2 MG: 0.2 INJECTION, SOLUTION INTRAMUSCULAR; INTRAVENOUS at 12:25

## 2022-06-29 RX ADMIN — PANTOPRAZOLE SODIUM 40 MG: 40 INJECTION, POWDER, FOR SOLUTION INTRAVENOUS at 05:57

## 2022-06-29 RX ADMIN — FLUTICASONE PROPIONATE 2 SPRAY: 50 SPRAY, METERED NASAL at 08:05

## 2022-06-29 RX ADMIN — OXYCODONE HYDROCHLORIDE 5 MG: 5 TABLET ORAL at 00:59

## 2022-06-29 RX ADMIN — ACETAMINOPHEN 975 MG: 325 TABLET, FILM COATED ORAL at 05:58

## 2022-06-29 RX ADMIN — SODIUM CHLORIDE, POTASSIUM CHLORIDE, SODIUM LACTATE AND CALCIUM CHLORIDE: 600; 310; 30; 20 INJECTION, SOLUTION INTRAVENOUS at 12:22

## 2022-06-29 RX ADMIN — LIDOCAINE HYDROCHLORIDE 3 ML: 10 INJECTION, SOLUTION INFILTRATION; PERINEURAL at 12:22

## 2022-06-29 RX ADMIN — SODIUM CHLORIDE: 9 INJECTION, SOLUTION INTRAVENOUS at 14:03

## 2022-06-29 SDOH — ECONOMIC STABILITY: INCOME INSECURITY: IN THE LAST 12 MONTHS, WAS THERE A TIME WHEN YOU WERE NOT ABLE TO PAY THE MORTGAGE OR RENT ON TIME?: NO

## 2022-06-29 NOTE — PLAN OF CARE
PRIMARY DIAGNOSIS: ACUTE PAIN  OUTPATIENT/OBSERVATION GOALS TO BE MET BEFORE DISCHARGE:  1. Pain Status: Improved-controlled with oral pain medications.    2. Return to near baseline physical activity: Yes    3. Cleared for discharge by consultants (if involved): No    Discharge Planner Nurse   Safe discharge environment identified: Yes  Barriers to discharge: Yes       Entered by: Konrad Waggoner RN 06/29/2022 6:03 PM     Please review provider order for any additional goals.   Nurse to notify provider when observation goals have been met and patient is ready for discharge.Goal Outcome Evaluation:      Will continue to monitor    Konrad Waggoner RN

## 2022-06-29 NOTE — PROGRESS NOTES
[facility]      OUTPATIENT OCCUPATIONAL THERAPY  EVALUATION  PLAN OF TREATMENT FOR OUTPATIENT REHABILITATION  (COMPLETE FOR INITIAL CLAIMS ONLY)  Patient's Last Name, First Name, M.I.  YOB: 1965  Rachel Schneider                          Provider's Name  [facility] Medical Record No.  [id number]                               Onset Date:  06/28/22   Start of Care Date:        Type:     ___PT   _X_OT   ___SLP Medical Diagnosis:                           OT Diagnosis:      Visits from SOC:  1   _________________________________________________________________________________  Plan of Treatment/Functional Goals    Planned Interventions:     Goals: See Occupational Therapy Goals on Care Plan in [facility] electronic health record.    Therapy Frequency: (P) Daily  Predicted Duration of Therapy Intervention:    _________________________________________________________________________________    I CERTIFY THE NEED FOR THESE SERVICES FURNISHED UNDER        THIS PLAN OF TREATMENT AND WHILE UNDER MY CARE     (Physician co-signature of this document indicates review and certification of the therapy plan).               ,      Referring Physician: Dada Rubio,             Initial Assessment        See Occupational Therapy evaluation dated   in Epic electronic health record.

## 2022-06-29 NOTE — PROGRESS NOTES
Pre-procedure Note    Reason for procedure: Melena, anemia    History and Physical Reviewed: Reviewed, no changes.    Pre-sedation assessment:    General: alert, appears stated age, and cooperative  Airway: normal  Neck: C-brace in place  Heart: regular rate and rhythm  Lungs: clear to auscultation bilaterally    Sedation Plan based on assessment: MAC    Mallampati score: Class II (visualization of the soft palate, fauces, and uvula)          ASA Classification: ASA 3 - Patient with moderate systemic disease with functional limitations    Impression: Patient deemed adequate candidate for MAC sedation    Risks, benefits and alternatives were discussed with the patient and informed consent was obtained.    Plan: esophagogastroduodenoscopy      Nils Jaeger MD 6/29/2022 11:35 AM                                               Nils Jaeger MD  Thank you for the opportunity to participate in the care of this patient.   Please feel free to call me with any questions or concerns.  Phone number [phone].

## 2022-06-29 NOTE — CONSULTS
Care Management Initial Consult    General Information  Assessment completed with: Patient,    Type of CM/SW Visit: Offer D/C Planning    Primary Care Provider verified and updated as needed: Yes   Readmission within the last 30 days:           Advance Care Planning: Advance Care Planning Reviewed: questions answered          Communication Assessment  Patient's communication style: spoken language (English or Bilingual)    Hearing Difficulty or Deaf: no   Wear Glasses or Blind: no    Cognitive  Cognitive/Neuro/Behavioral: WDL                      Living Environment:   People in home: alone     Current living Arrangements: house      Able to return to prior arrangements: yes       Family/Social Support:  Care provided by: self  Provides care for: no one  Marital Status:   Children, Neighbor, Sibling(s)          Description of Support System: Supportive, Involved    Support Assessment: Adequate family and caregiver support    Current Resources:   Patient receiving home care services: No     Community Resources:    Equipment currently used at home:    Supplies currently used at home:      Employment/Financial:  Employment Status: disabled, retired        Financial Concerns: No concerns identified   Referral to Financial Worker: No       Lifestyle & Psychosocial Needs:  Social Determinants of Health     Tobacco Use: Medium Risk     Smoking Tobacco Use: Former Smoker     Smokeless Tobacco Use: Unknown   Alcohol Use: Heavy Drinker     Frequency of Alcohol Consumption: 2-4 times a month     Average Number of Drinks: 1 or 2     Frequency of Binge Drinking: Less than monthly   Financial Resource Strain: Low Risk      Difficulty of Paying Living Expenses: Not hard at all   Food Insecurity: No Food Insecurity     Worried About Running Out of Food in the Last Year: Never true     Ran Out of Food in the Last Year: Never true   Transportation Needs: No Transportation Needs     Lack of Transportation (Medical): No     Lack  "of Transportation (Non-Medical): No   Physical Activity: Inactive     Days of Exercise per Week: 0 days     Minutes of Exercise per Session: 0 min   Stress: No Stress Concern Present     Feeling of Stress : Only a little   Social Connections: Socially Isolated     Frequency of Communication with Friends and Family: Once a week     Frequency of Social Gatherings with Friends and Family: Once a week     Attends Sikh Services: Never     Active Member of Clubs or Organizations: No     Attends Club or Organization Meetings: Never     Marital Status: Never    Intimate Partner Violence: Not At Risk     Fear of Current or Ex-Partner: No     Emotionally Abused: No     Physically Abused: No     Sexually Abused: No   Depression: Not on file   Housing Stability: Low Risk      Unable to Pay for Housing in the Last Year: No     Number of Places Lived in the Last Year: 1     Unstable Housing in the Last Year: No       Functional Status:  Prior to admission patient needed assistance: Independent              Mental Health Status:  Mental Health Status: Past Concern  Mental Health Management: Medication    Chemical Dependency Status:  Chemical Dependency Status: No Current Concerns             Values/Beliefs:  Spiritual, Cultural Beliefs, Sikh Practices, Values that affect care:                 Additional Information:  SW met with pt, BURRELL notice provided. Pt lives alone in a 1 [address] [facility]. She has a strong \"posse\" of girlfriends, a lot of family and adult children. Pt states she has a good support network. Pt has a history of depression, losing both of her parents and  within 15 months of each other. Pt denies other concerns.   Pt did say she is sad that her summer is now \"shot\" due to being in the brace for 12 weeks. She was hoping to have vacation time with family.  SW to follow for OT/PT recommendations. Pt aware that SW will be following in her chart.  Family or friends to transport.    Radha GRIFFITHS" SOFIA Bernal

## 2022-06-29 NOTE — PROGRESS NOTES
[facility]    Medicine Progress Note - Hospitalist Service    Date of Admission:  6/28/2022  Assessment & Plan        Rachel Oseitanvipedro is a 56-year-old female with history of anxiety/depression, mood disorder, chronic low back pain, fibromyalgia, hypertension, hepatic steatosis, asthma admitted on 6/29/2022 due to C3 transverse process fracture after fall as well as syncope possibly due to GI bleed.  Patient endorsed history of melena.    EGD 6/29/2022 revealed large grade B reflux esophagitis with benign-appearing esophageal stricture, gastritis as well as nonobstructing nonbleeding duodenal ulcer with pigmented material.  Pathology report is pending.  Gastroenterologist recommended pantoprazole 40 mg IV twice daily with plan to transition to 40 mg daily at discharge and repeat EGD in 8 weeks to check for healing, full liquid diet, and avoidance of aspirin or NSAIDs.    Suspected GI bleed  EGD 6/29/2022 revealed large grade B reflux esophagitis   EGD 6/29/2022 revealed large grade B reflux esophagitis with benign-appearing esophageal stricture, gastritis as well as nonobstructing nonbleeding duodenal ulcer with pigmented material.  Pathology report is pending.  Gastroenterologist recommended pantoprazole 40 mg IV twice daily with plan to transition to 40 mg daily at discharge and repeat EGD in 8 weeks to check for healing, full liquid diet, and avoidance of aspirin or NSAIDs.  Hemoglobin dropped from 12.5 to 10.1.    -- Avoid aspirin or NSAIDs  -- Continue pantoprazole 40 mg IV every 12 hours  --  Continue clear liquid diet  -- Trend hemoglobin every 12 hours, transfuse for hemoglobin less than 7  --  Gastroenterology following-appreciate assistance        Fall with right C3 transverse process fracture:    Possibly sustained when she had syncope on 6/26/2022 versus relative hypotension from acute blood loss.   CTA negative for vertebral artery injury  CT head negative for any acute  process.    CT cervical spine shows nondisplaced fracture right transverse process at C3.  Cervical MRI showed right C3 transverse process fracture with mild regional edema as well as postsurgical changes as above with mild spinal stenosis at C3-C4, C6-C7.    --Neurosurgery team was consulted on admission; recommended cervical collar at all times  -- PT/OT  -- Pain control with scheduled tylenol and oxycodone as needed        Syncope  Possibly related to relative hypotension from acute blood loss  EKG with nonspecific findings  Head CT negative  CTA shows  50% stenosis right vertebral artery V1 segment secondary to atherosclerotic plaque and mild left carotid stenosis.  -- monitor on telemetry for any arrhyhtmia   --Continue t Zocor 20mg qhs   -- Continue to hold home lisinopril for now  -- IVF support        ARJUN  Resolved  -- Continue to Hold home lisinopril  --Stop IV fluids  -- Monitor BMP        Mild hyperkalemia   Resolved  Possibly related to ARJUN from volume depletion and lisinopril use  -- Stop IV hydration  --Continue to hold lisinopril as above        History of hepatic steatosis  Transaminitis  Liver enzymes including ALP, ALT, AST are worsening.  Endorsed regular alcohol consumption.  --Hold simvastatin and Tylenol for now  --Trend LFT  -- Consider abdominal imaging if LFT is worsening  --Check Tylenol level  --Abstain from alcohol consumption  -- GI following-appreciate assistance       Chronic radicular low back pain, fibromyalgia, history of anxiety/depression:  --Holding home Celebrex as above  -- Continue home Cymbalta     Asthma  Continue PTA fluticasone  DuoNebs as needed  Supplemental oxygen as needed     Diet: Full Liquid Diet    DVT Prophylaxis: Pneumatic Compression Devices  Valdez Catheter: Not present  Central Lines: None  Cardiac Monitoring: None  Code Status: Full Code      Disposition Plan    few days     The patient's care was discussed with the Patient and daughter at  bedside    Gerry Francois MD  Hospitalist Service  [facility]  Securely message with the [employer] Web Console (learn more here)  Text page via [employer] Paging/Directory       Clinically Significant Risk Factors Present on Admission             # Hypoalbuminemia: Albumin = 2.8 g/dL (Ref range: 3.5 - 5.0 g/dL) on admission, will monitor as appropriate     # Hypertension: home medication list includes antihypertensive(s)   # Circulatory Shock: currently requiring pressors for blood pressure support         ______________________________________________________________________    Interval History   No new complaints at this time.  She had endoscopy earlier.  She denies abdominal pain, nausea, vomiting, fever or chills.  Patient endorsed regular alcohol consumption.  EGD findings and GI recommendation were discussed with patient and daughter at bedside.    Data reviewed today: I reviewed all medications, new labs and imaging results over the last 24 hours. I personally reviewed no images or EKG's today.    Physical Exam   Vital Signs: Temp: 97.7  F (36.5  C) Temp src: Oral BP: 139/80 Pulse: 69   Resp: 15 SpO2: 100 % O2 Device: None (Room air)    Weight: 140 lbs 13.98 oz    General appearance: Awake, Alert, Cooperative, not in any obvious distress and appears stated age   HEENT: Normocephalic, atraumatic, conjunctiva clear without icterus and ears without discharge  Neck: Neck collar in place  Lungs: Clear to auscultation bilaterally, no wheezing, good air exchange, normal work of breathing  Cardiovascular: Regular Rate and Rythm, normal apical impulse, normal S1 and S2, no lower extremity edema bilaterally  Abdomen: Soft, non-tender and Non-distended, active bowel sounds  Skin: Skin color, texture normal and bruising or bleeding. No rashes or lesions over face, neck, arms and legs, turgor normal.  Musculoskeletal: No bony deformities or joint tenderness. Normal ROM upon flexion & extension.    Neurologic: Alert & Oriented X 3, Facial symmetry preserved and upper & lower extremities moving well with symmetry  Psychiatric: Calm, normal eye contact and normal affect      Data   Recent Results (from the past 24 hour(s))   MR CERVICAL SPINE W/O CONTRAST    Narrative    EXAM: MR CERVICAL SPINE W/O CONTRAST  LOCATION: [facility]  DATE/TIME: 6/28/2022 7:54 PM    INDICATION: Neck pain; Trauma; Mild moderate trauma; Cervical CT result available; r o Fracture; No known automatically detected potential contraindications to MRI  COMPARISON: CT cervical spine 06/28/2022  TECHNIQUE: MRI Cervical Spine without IV contrast.    FINDINGS:   Alignment: Normal.  Anterior discectomy and solid interbody fusion changes with anterior hardware instrumentation from C4 through C7. Susceptibility artifact related to the hardware limits hardware assessment.  Normal vertebral body heights. The known C3 transverse process fracture is visualized and mild regional edema. It is better seen on the CT performed earlier in the day.  Marrow signal: Normal.  No extraspinal abnormality.     Craniovertebral junction: Normal.    C1-2: Normal.    C2-3: Slight loss of disc height. Slight focal midline disc osteophyte. Left uncovertebral hypertrophy. Moderate left facet hypertrophy. No central spinal stenosis. Right foramen: no stenosis. Left foramen: moderate stenosis.    C3-4: Slight loss of disc height. Broad based disc osteophyte complex. No uncovertebral hypertrophy. Mild bilateral facet hypertrophy. Mild central spinal stenosis. Right foramen: moderate stenosis. Left foramen: moderate stenosis.    C4-5:  Status post ACDF. Broad based disc osteophyte complex. No uncovertebral hypertrophy. Mild right facet hypertrophy. No central spinal stenosis. Right foramen: no stenosis. Left foramen: no stenosis.    C5-6: Status post ACDF. Broad based disc osteophyte complex. No uncovertebral hypertrophy. Mild right facet  hypertrophy. No central spinal stenosis. Right foramen: mild to moderate stenosis. Left foramen: mild to moderate stenosis.    C6-7: Status post ACDF. Broad based disc osteophyte complex. Bilateral uncovertebral hypertrophy. Mild right facet hypertrophy. Mild central spinal stenosis. Right foramen: moderate stenosis. Left foramen: moderate stenosis.    C7-T1: Normal height of disc. No disc herniation. No uncovertebral hypertrophy. Normal facet joints. No central spinal stenosis. Right foramen: no stenosis. Left foramen: no stenosis.      Impression    IMPRESSION:  1.  Right C3 transverse process fracture is visualized with mild regional edema. The fracture is better seen on CT earlier today. No additional fractures are visualized.  2.  No evidence of any posterior tension band injury.  3.  Postsurgical changes as above with mild spinal stenosis at C3-C4, C6-C7.   XR Cervical Spine 2/3 Views    Narrative    EXAM: XR CERVICAL SPINE 2/3 VWS  LOCATION: [facility]  DATE/TIME: 6/29/2022 3:16 PM    INDICATION: Follow-up right C3 transverse process fracture  COMPARISON: CT cervical spine 06/20/2022  TECHNIQUE: CR Cervical Spine.      Impression    IMPRESSION: The known fracture of the right C3 transverse process is not well redemonstrated radiographically. Stable normal vertebral body heights with unchanged alignment. No interval acute fracture or traumatic subluxation.    Redemonstration of CDS at C4-C7, without hardware complication. Mild interbody degenerative change at C3-C4 and mild to moderate multilevel degenerative facet arthropathy. Normal appearance of the anterior C1-C2 articulation. No prevertebral edema.   Visualized lung apices are unremarkable.

## 2022-06-29 NOTE — PLAN OF CARE
PRIMARY DIAGNOSIS: GI BLEED    OUTPATIENT/OBSERVATION GOALS TO BE MET BEFORE DISCHARGE  Orthostatic performed: No    Stable Hgb No.   Recent Labs   Lab Test 06/29/22  0654 06/28/22  1938 06/28/22  1333   HGB 10.1*  10.1* 10.6* 12.5       Resolved or declined bleeding episodes: Yes    Appropriate testing complete: No    Cleared for discharge by consultants (if involved): No    Safe discharge environment identified:  TBD

## 2022-06-29 NOTE — TREATMENT PLAN
RCAT Treatment Plan    Patient Score: 5  Patient Acuity: 2    Clinical Indication for Therapy: asthma    Therapy Ordered: duoneb QID    Assessment Summary: Pt is a current smoker (less than 1/2 pack per day) with pulmonary history of asthma. Pt uses albuterol inhaler at home, no home nebs. Pt is on room air. Respiratory rate and pattern WNL. BBS clear pre and post neb tx. Nonproductive cough. Scheduled nebs are not indicated at this time. Will change duoneb QID to PRN.     Roberta Tucker, RT  6/29/2022

## 2022-06-29 NOTE — PROGRESS NOTES
06/29/22 1420   Quick Adds   Type of Visit Initial Occupational Therapy Evaluation   Living Environment   People in Home alone   Current Living Arrangements condominium   Home Accessibility stairs to enter home   Number of Stairs, Main Entrance 1   Stair Railings, Main Entrance railings safe and in good condition;railing on left side (ascending)   Transportation Anticipated car, drives self   Living Environment Comments Pt lives in 1 floor condo alone. Pt has a tub shower with a shower chair. Pt uses raised toilet seat. There are no grab bars by the toilet, but pt uses sink countertop to push off of.   Self-Care   Equipment Currently Used at Home cane, straight;walker, rolling  (owns, does not typically use)   Fall history within last six months yes   Number of times patient has fallen within last six months 3   Activity/Exercise/Self-Care Comment IND with ADLs at baseline. Pt has baseline R hip pain standing, walking or turning. L hip replaced and is painful occasionally.   Instrumental Activities of Daily Living (IADL)   IADL Comments Pt is IND in most IADLs, however her son does her cleaning.   General Information   Onset of Illness/Injury or Date of Surgery 06/28/22   Referring Physician Dada Rubio DO   Additional Occupational Profile Info/Pertinent History of Current Problem Per chart review, pt is a 56-year-old female with history of anxiety/depression, mood disorder, chronic low back pain, fibromyalgia, hypertension, hepatic steatosis, asthma who presents with melena and C3 transverse process fracture after fall.   Existing Precautions/Restrictions other (see comments)  (cervical brace)   Cognitive Status Examination   Orientation Status orientation to person, place and time   Cognitive Status Comments Pt noted to be impulsive, requiring frequent verbal cues to wait for therapist mobilize IV pole.   Range of Motion Comprehensive   General Range of Motion no range of motion deficits identified    Strength Comprehensive (MMT)   General Manual Muscle Testing (MMT) Assessment no strength deficits identified   Bed Mobility   Bed Mobility rolling right;supine-sit;sit-supine   Rolling Right Woodruff (Bed Mobility) supervision   Supine-Sit Woodruff (Bed Mobility) supervision   Sit-Supine [place] (Bed Mobility) supervision   Transfers   Transfers sit-stand transfer;toilet transfer   Sit-Stand Transfer   Sit-Stand Woodruff (Transfers) supervision   Toilet Transfer   Type (Toilet Transfer) sit-stand;stand-sit   Woodruff Level (Toilet Transfer) supervision;verbal cues   Toilet Transfer Comments Pt required verbal cues for ensuring IV pole in correct position before sitting.   Activities of Daily Living   BADL Assessment/Intervention lower body dressing;toileting   Lower Body Dressing Assessment/Training   Position (Lower Body Dressing) edge of bed sitting   Comment, (Lower Body Dressing) Pt unable to doff socks sitting at EOB d/t bilateral hip pain.   Woodruff Level (Lower Body Dressing) dependent (less than 25% patient effort)   Toileting   Assistive Devices (Toileting) grab bar, toilet   Woodruff Level (Toileting) supervision   Clinical Impression   Criteria for Skilled Therapeutic Interventions Met (OT) Yes, treatment indicated   OT Diagnosis Impaired ability to perform ADLs.   Influenced by the following impairments melena and C3 transverse process fx   OT Problem List-Impairments impacting ADL problems related to;cognition;pain   Assessment of Occupational Performance 1-3 Performance Deficits   Identified Performance Deficits cognition, lower body dressing   Planned Therapy Interventions (OT) ADL retraining;cognition   Clinical Decision Making Complexity (OT) low complexity   Anticipated Equipment Needs Upon Discharge (OT) dressing equipment   Risk & Benefits of therapy have been explained evaluation/treatment results reviewed;care plan/treatment goals reviewed;risks/benefits  reviewed;current/potential barriers reviewed;participants voiced agreement with care plan;participants included;patient   Clinical Impression Comments Pt would benefit from OT services to address lower body dressing and further assess cognition.   OT Discharge Planning   OT Discharge Recommendation (DC Rec) home with assist   OT Rationale for DC Rec Pt would continue to require assistance with IADLs.   Therapy Certification   Start of Care Date 06/29/22   Certification date from 06/29/22   Certification date to 07/06/22   Medical Diagnosis melena and C3 transverse process fx   Total Evaluation Time (Minutes)   Total Evaluation Time (Minutes) 15   OT Goals   Therapy Frequency (OT) Daily   OT Goals Lower Body Dressing;Cognition   OT: Lower Body Dressing Modified independent;using adaptive equipment;including set-up/clothing retrieval   OT: Cognitive Patient/caregiver will verbalize understanding of cognitive assessment results/recommendations as needed for safe discharge planning

## 2022-06-29 NOTE — PROGRESS NOTES
Acute Traumatic Pain     1.  Pain controlled with oral analgesia: Yes      2.  Vital signs stable: Yes      3.  Diagnotic testing complete: Yes      4.  Cleared from consultants (if applicable): No      5. Return to near baseline physical activity: Yes     Tolerating clear liquids

## 2022-06-29 NOTE — ANESTHESIA PREPROCEDURE EVALUATION
Anesthesia Pre-Procedure Evaluation    Patient: Rachel Schneider   MRN: [id number] : 1965        Procedure : Procedure(s):  ESOPHAGOGASTRODUODENOSCOPY (EGD)          Past Medical History:   Diagnosis Date     Asthma      Chronic low back pain      Depression      Fibromyalgia      Hepatic steatosis      HTN (hypertension)      MRSA infection      Obesity       Past Surgical History:   Procedure Laterality Date     EXCISE BREAST CYST/FIBROADENOMA/TUMOR/DUCT LESION/NIPPLE LESION/AREOLAR LESION      Description: Breast Surgery Lumpectomy;  Recorded: 2014;     HC REMOVAL GALLBLADDER      Description: Cholecystectomy;  Recorded: 2014;     HC REMOVAL OF TONSILS,<13 Y/O      Description: Tonsillectomy;  Recorded: 2014;     [facility] CERV SPINE FUSN,ANTER,BELOW C2      Description: Cervical Vertebral Fusion;  Recorded: 2014;     [facility] GARY W/O FACETEC FORAMOT/DSKC  VRT SEG, CERVICAL      Description: Laminectomy Lumbar;  Recorded: 2014;     [facility] TOTAL ABDOM HYSTERECTOMY      Description: Hysterectomy;  Recorded: 2014;      Allergies   Allergen Reactions     Gabapentin Unknown      Social History     Tobacco Use     Smoking status: Former Smoker     Types: Cigarettes     Quit date: 2009     Years since quittin.7     Smokeless tobacco: Not on file   Substance Use Topics     Alcohol use: Yes     Alcohol/week: 3.3 - 4.2 standard drinks     Types: 4 - 5 drink(s) per week      Wt Readings from Last 1 Encounters:   22 63.9 kg (140 lb 14 oz)        Anesthesia Evaluation            ROS/MED HX  ENT/Pulmonary:     (+) asthma     Neurologic: Comment: C3 transverse process fracture      Cardiovascular:     (+) hypertension-----    METS/Exercise Tolerance:     Hematologic:       Musculoskeletal:       GI/Hepatic:     (+) liver disease,     Renal/Genitourinary:     (+) renal disease,     Endo:       Psychiatric/Substance Use:       Infectious Disease:       Malignancy:        Other:            Physical Exam    Airway  airway exam normal           Respiratory Devices and Support         Dental  no notable dental history         Cardiovascular   cardiovascular exam normal          Pulmonary   pulmonary exam normal                OUTSIDE LABS:  CBC:   Lab Results   Component Value Date    WBC 4.1 06/29/2022    WBC 6.2 06/28/2022    HGB 10.1 (L) 06/29/2022    HGB 10.1 (L) 06/29/2022    HCT 30.4 (L) 06/29/2022    HCT 36.6 06/28/2022     (L) 06/29/2022     06/28/2022     BMP:   Lab Results   Component Value Date     (L) 06/29/2022     (L) 06/29/2022    POTASSIUM 4.3 06/29/2022    POTASSIUM 4.3 06/29/2022    CHLORIDE 106 06/29/2022    CHLORIDE 106 06/29/2022    CO2 21 (L) 06/29/2022    CO2 21 (L) 06/29/2022    BUN 25 (H) 06/29/2022    BUN 25 (H) 06/29/2022    CR 0.73 06/29/2022    CR 0.73 06/29/2022    GLC 74 06/29/2022    GLC 74 06/29/2022     COAGS:   Lab Results   Component Value Date    PTT 25 06/21/2019    INR 0.93 06/28/2022     POC: No results found for: BGM, HCG, HCGS  HEPATIC:   Lab Results   Component Value Date    ALBUMIN 2.8 (L) 06/29/2022    PROTTOTAL 5.1 (L) 06/29/2022    ALT 99 (H) 06/29/2022     (H) 06/29/2022    ALKPHOS 229 (H) 06/29/2022    BILITOTAL 0.4 06/29/2022     OTHER:   Lab Results   Component Value Date    LACT 1.0 06/28/2022    WILSON 8.2 (L) 06/29/2022    WILSON 8.2 (L) 06/29/2022    MAG 1.8 06/28/2022    LIPASE <9 06/21/2019       Anesthesia Plan    ASA Status:  3      Anesthesia Type: MAC.   Induction: Intravenous.           Consents    Anesthesia Plan(s) and associated risks, benefits, and realistic alternatives discussed. Questions answered and patient/representative(s) expressed understanding.     - Discussed: Risks, Benefits and Alternatives for BOTH SEDATION and the PROCEDURE were discussed     - Discussed with:  Patient         Postoperative Care    Pain management: Multi-modal analgesia.   PONV prophylaxis: Ondansetron (or other  5HT-3), Dexamethasone or Solumedrol     Comments:    Other Comments: Keep the neck collar brace on            Min LEONARD Rizvi MD

## 2022-06-29 NOTE — PROGRESS NOTES
S: Rachel Schneider was seen at [facility], ED Room 21 for a [place] J Cervical Collar, [place] J replacement pad set and Cole closed cell foam collar for shower/hygiene.      Dx: Right C3 transverse process fracture    O:  The patient presents in an [place] Cervical Collar.  The patient was fit, with the assistance of a CNA for spinal immobilization, with a size 400 [place]J Cervical Collar.  The Cole Cervical Collar was then adjusted to the patient s size.    A:  I demonstrated the donning and doffing of the collars to the patient.  I then answered her Cervical Collar questions.  I then provided the  s written documentation along with the [place] J replacement pad set and Cole closed cell foam collar.     P: The orthosis should be worn according to the physician s directions.  Please contact the [facility] at [phone] if you have any questions.  Thank you, Sarabjit    Note electronically signed by Sarabjit Mejia CPO, LPO

## 2022-06-29 NOTE — CONSULTS
[facility] DIGESTIVE HEALTH CONSULTATION    Rachel CruzSivanDeidrarico   [address]   [place] [facility] [address]  56 year old female    Admission Date/Time: 6/28/2022 12:55 PM    Primary Care Provider:  Kika Leblanc    Requesting Physician: Gerry Francois MD      CHIEF COMPLAINT:   Dark stool    REASON FOR THE CONSULT:  Melena, anemia    HPI:   56 year old yo F with hx of fibromyalgia, chronic pain, HTN, presents to the hospital with dark stool. Reports sudden onset on Monday night. Loose dark black stool. Denies hematochezia. No abdominal pain. Associated nausea and non-bloody emesis. Pt reports starting lisinopril recently. Experienced lightheadedness, syncope, and suffered a fall this weekend on Sunday. Reports using APAP. Denies NSAID use. Rare EtOH per pt report. Denies hx of liver disease; although hepatic steatosis noted on PMH. Passed 3 total black stool. Last BM was last evening. No previous EGD. Past colonoscopy many years ago. C/o GERD and dysphagia; solids and liquids. Uses TUMS for reflux. S/p molly, denies other abdominal surgery.  No abdominal imaging.      REVIEW OF SYSTEMS:   10 point ROS neg other than the symptoms noted above in the HPI.    MEDICATIONS:  Current Outpatient Medications   Medication Instructions     albuterol (PROAIR HFA/PROVENTIL HFA/VENTOLIN HFA) 108 (90 Base) MCG/ACT inhaler 2 puffs, Inhalation, EVERY 4 HOURS PRN     celecoxib (CELEBREX) 400 mg, Oral, AT BEDTIME     DULoxetine (CYMBALTA) 120 mg, Oral, AT BEDTIME     fluticasone (FLONASE) 50 MCG/ACT nasal spray 2 sprays, Nasal, DAILY     lisinopril (ZESTRIL) 20 mg, Oral, AT BEDTIME     loratadine (CLARITIN) 10 mg, Oral, DAILY PRN     ondansetron (ZOFRAN ODT) 4 mg, Oral, EVERY 8 HOURS PRN       PAST MEDICAL HISTORY:  Past Medical History:   Diagnosis Date     Asthma      Chronic low back pain      Depression      Fibromyalgia      Hepatic steatosis      HTN (hypertension)      MRSA infection      Obesity        PAST SURGICAL  "HISTORY:  Past Surgical History:   Procedure Laterality Date     EXCISE BREAST CYST/FIBROADENOMA/TUMOR/DUCT LESION/NIPPLE LESION/AREOLAR LESION      Description: Breast Surgery Lumpectomy;  Recorded: 2014;     [facility] REMOVAL GALLBLADDER      Description: Cholecystectomy;  Recorded: 2014;     [facility] REMOVAL OF TONSILS,<11 Y/O      Description: Tonsillectomy;  Recorded: 2014;     [facility] CERV SPINE FUSN,ANTER,BELOW C2      Description: Cervical Vertebral Fusion;  Recorded: 2014;     [facility] GARY W/O FACETEC FORAMOT/DSKC  VRT SEG, CERVICAL      Description: Laminectomy Lumbar;  Recorded: 2014;     [facility] TOTAL ABDOM HYSTERECTOMY      Description: Hysterectomy;  Recorded: 2014;       FAMILY HISTORY:  Family History   Adopted: Yes   Problem Relation Age of Onset     Breast Cancer Mother        SOCIAL HISTORY:  Social History     Tobacco Use     Smoking status: Former Smoker     Types: Cigarettes     Quit date: 2009     Years since quittin.7     Smokeless tobacco: Not on file   Substance Use Topics     Alcohol use: Yes     Alcohol/week: 3.3 - 4.2 standard drinks     Types: 4 - 5 drink(s) per week       ALLERGIES/SENSITIVITIES:  Gabapentin      PHYSICAL EXAM:  /57 (BP Location: Left arm, Patient Position: Semi-Yanez's)   Pulse 66   Temp 97.9  F (36.6  C) (Oral)   Resp 16   Ht 1.6 m (5' 3\")   Wt 63.9 kg (140 lb 14 oz)   SpO2 99%   BMI 24.95 kg/m    Body mass index is 24.95 kg/m .  General: A&O, NAD, non-toxic appearing  Eyes: No icterus or conjunctivitis  ENT: MMM, OP clear without ulcerations  Neck/Thyroid: Supple, no masses, bruising on left neck.  Pulmonary: CTA B  Cardiovascular: RR, S1, S2  Gastrointestinal: Soft, NTTP, NABS, no r/g  Skin: No jaundice/petechiae/rashes  Lymph: No cervical or supraclavicular lymphadenopathy  Extrem: PPI, no c/c/e      LABORATORY DATA:  CBC:  Recent Labs   Lab Test 22  0654   WBC 4.1   RBC 2.55*   HGB 10.1*  10.1*   HCT 30.4*   * "   MCH 39.2*   MCHC 33.4   RDW 13.6   *        BMP:  Recent Labs   Lab 06/29/22  0655 06/28/22  1333   *  135* 132*   POTASSIUM 4.3  4.3 5.2*   CHLORIDE 106  106 96*   CO2 21*  21* 25   GLC 74  74 97   CR 0.73  0.73 1.13*   BUN 25*  25* 38*       INR:  Recent Labs   Lab Test 06/28/22  1333   INR 0.93       Liver and Pancreas panel:  Recent Labs   Lab 06/29/22  0655 06/28/22  1333   * 43*   ALT 99* 54*   ALKPHOS 229* 160*   BILITOTAL 0.4 0.7         IMAGING:    MR CERVICAL SPINE W/O CONTRAST  Result Date: 6/28/2022  EXAM: MR CERVICAL SPINE W/O CONTRAST LOCATION: [facility] DATE/TIME: 6/28/2022 7:54 PM INDICATION: Neck pain; Trauma; Mild moderate trauma; Cervical CT result available; r o Fracture; No known automatically detected potential contraindications to MRI COMPARISON: CT cervical spine 06/28/2022 TECHNIQUE: MRI Cervical Spine without IV contrast. FINDINGS: Alignment: Normal. Anterior discectomy and solid interbody fusion changes with anterior hardware instrumentation from C4 through C7. Susceptibility artifact related to the hardware limits hardware assessment. Normal vertebral body heights. The known C3 transverse process fracture is visualized and mild regional edema. It is better seen on the CT performed earlier in the day. Marrow signal: Normal. No extraspinal abnormality. Craniovertebral junction: Normal. C1-2: Normal. C2-3: Slight loss of disc height. Slight focal midline disc osteophyte. Left uncovertebral hypertrophy. Moderate left facet hypertrophy. No central spinal stenosis. Right foramen: no stenosis. Left foramen: moderate stenosis. C3-4: Slight loss of disc height. Broad based disc osteophyte complex. No uncovertebral hypertrophy. Mild bilateral facet hypertrophy. Mild central spinal stenosis. Right foramen: moderate stenosis. Left foramen: moderate stenosis. C4-5:  Status post ACDF. Broad based disc osteophyte complex. No uncovertebral  hypertrophy. Mild right facet hypertrophy. No central spinal stenosis. Right foramen: no stenosis. Left foramen: no stenosis. C5-6: Status post ACDF. Broad based disc osteophyte complex. No uncovertebral hypertrophy. Mild right facet hypertrophy. No central spinal stenosis. Right foramen: mild to moderate stenosis. Left foramen: mild to moderate stenosis. C6-7: Status post ACDF. Broad based disc osteophyte complex. Bilateral uncovertebral hypertrophy. Mild right facet hypertrophy. Mild central spinal stenosis. Right foramen: moderate stenosis. Left foramen: moderate stenosis. C7-T1: Normal height of disc. No disc herniation. No uncovertebral hypertrophy. Normal facet joints. No central spinal stenosis. Right foramen: no stenosis. Left foramen: no stenosis.     IMPRESSION: 1.  Right C3 transverse process fracture is visualized with mild regional edema. The fracture is better seen on CT earlier today. No additional fractures are visualized. 2.  No evidence of any posterior tension band injury. 3.  Postsurgical changes as above with mild spinal stenosis at C3-C4, C6-C7.      CTA Neck with Contrast  Result Date: 6/28/2022  EXAM: CTA NECK WITH CONTRAST LOCATION: [facility] DATE/TIME: 6/28/2022 3:06 PM INDICATION: History of C3 transverse process fracture, CT performed to evaluate for vessel injury. COMPARISON: CT cervical spine 6/28/2022 CONTRAST: IsoVue 370 75mL TECHNIQUE: Neck CT angiogram with IV contrast. Axial helical CT images of the neck vessels were obtained during the arterial phase of intravenous contrast administration. Axial helical 2D reconstructed images and multiplanar 3D MIP reconstructed images of the neck vessels were performed by the technologist. Dose reduction techniques were used. All stenosis measurements made according to NASCET criteria unless otherwise specified. FINDINGS: RIGHT CAROTID: No measurable stenosis or dissection. LEFT CAROTID: Mild 30% stenosis secondary to  coarsely calcified atherosclerotic plaque. No dissection. VERTEBRAL ARTERIES: No evidence of dissection. Right-side dominant vertebral arterial system. 50% stenosis right vertebral artery V1 segment secondary to atherosclerotic plaque. AORTIC ARCH: Classic aortic arch anatomy with no significant stenosis at the origin of the great vessels. NONVASCULAR STRUCTURES: Mild centrilobular emphysematous changes. Cervical spinal fusion, right C3 transverse process fracture and spondylosis as described on the comparison examination. Probable 2 mm nodule left thyroid lobe, partially obscured by hardware artifact.     IMPRESSION: No evidence of vertebral artery injury associated with the nondisplaced C3 right transverse process fracture. 50% stenosis right vertebral artery V1 segment secondary to atherosclerotic plaque. Mild left carotid stenosis. Probable 2 mm nodule left thyroid lobe, partially obscured by hardware artifact. This is of doubtful clinical significance in a patient of this age.      Cervical spine CT w/o contrast  Result Date: 6/28/2022  EXAM: CT HEAD W/O CONTRAST, CT CERVICAL SPINE W/O CONTRAST LOCATION: [facility] DATE/TIME: 6/28/2022 1:50 PM INDICATION: Multiple recent falls. Left-sided neck pain. COMPARISON: None. TECHNIQUE: 1) Routine CT Head without IV contrast. Multiplanar reformats. Dose reduction techniques were used. 2) Routine CT Cervical Spine without IV contrast. Multiplanar reformats. Dose reduction techniques were used. FINDINGS: HEAD CT: INTRACRANIAL CONTENTS: No intracranial hemorrhage, extraaxial collection, or mass effect.  No CT evidence of acute infarct. Mild presumed chronic small vessel ischemic changes. Minor cerebral volume loss. No hydrocephalus. VISUALIZED ORBITS/SINUSES/MASTOIDS: No intraorbital abnormality. No paranasal sinus mucosal disease. No middle ear or mastoid effusion. BONES/SOFT TISSUES: No scalp hematoma. No skull fracture. CERVICAL SPINE CT:  VERTEBRA: Straightened cervical lordosis. 2 mm degenerative type anterolisthesis at C2-C3 and C3-C4. Preserved vertebral body heights. Anterior discectomy and solid interbody fusion changes with anterior hardware instrumentation from C4 through C7. No evidence for hardware complication. Nondisplaced right transverse process fracture involving the right transverse foramen at C3. No additional fracture or posttraumatic subluxation identified. CANAL/FORAMINA: Mild multilevel cervical spondylosis. No CT evidence for high-grade central spinal canal stenosis. Moderate to severe right neural foraminal stenosis at C3-C4. Mild to moderate bilateral neural foraminal stenosis at C5-C6 and C6-C7, right  greater than left. PARASPINAL: Prevertebral soft tissues within normal limits for thickness. Atherosclerotic calcifications of the carotid bifurcations. Visualized lung fields are clear.     IMPRESSION: HEAD CT: 1.  No CT evidence for acute intracranial process. 2.  Brain atrophy and presumed chronic microvascular ischemic changes as above. CERVICAL SPINE CT: 1.  Nondisplaced fracture of the right transverse process at C3. This involves the right transverse foramen. CTA of the neck is advised to exclude associated vascular injury at this level. 2.  No additional fracture or posttraumatic subluxation. 3.  Multilevel cervical fusion without hardware complication. 4.  Multilevel cervical spondylosis as above. [Critical Result: Acute cervical spine fracture] Finding was identified on 6/28/2022 2:11 PM. JOSUÉ Frank was contacted by me on 6/28/2022 2:25 PM and verbalized understanding of the critical result.       Head CT w/o contrast  Result Date: 6/28/2022  EXAM: CT HEAD W/O CONTRAST, CT CERVICAL SPINE W/O CONTRAST LOCATION: [facility] DATE/TIME: 6/28/2022 1:50 PM INDICATION: Multiple recent falls. Left-sided neck pain. COMPARISON: None. TECHNIQUE: 1) Routine CT Head without IV contrast. Multiplanar reformats.  Dose reduction techniques were used. 2) Routine CT Cervical Spine without IV contrast. Multiplanar reformats. Dose reduction techniques were used. FINDINGS: HEAD CT: INTRACRANIAL CONTENTS: No intracranial hemorrhage, extraaxial collection, or mass effect.  No CT evidence of acute infarct. Mild presumed chronic small vessel ischemic changes. Minor cerebral volume loss. No hydrocephalus. VISUALIZED ORBITS/SINUSES/MASTOIDS: No intraorbital abnormality. No paranasal sinus mucosal disease. No middle ear or mastoid effusion. BONES/SOFT TISSUES: No scalp hematoma. No skull fracture. CERVICAL SPINE CT: VERTEBRA: Straightened cervical lordosis. 2 mm degenerative type anterolisthesis at C2-C3 and C3-C4. Preserved vertebral body heights. Anterior discectomy and solid interbody fusion changes with anterior hardware instrumentation from C4 through C7. No evidence for hardware complication. Nondisplaced right transverse process fracture involving the right transverse foramen at C3. No additional fracture or posttraumatic subluxation identified. CANAL/FORAMINA: Mild multilevel cervical spondylosis. No CT evidence for high-grade central spinal canal stenosis. Moderate to severe right neural foraminal stenosis at C3-C4. Mild to moderate bilateral neural foraminal stenosis at C5-C6 and C6-C7, right  greater than left. PARASPINAL: Prevertebral soft tissues within normal limits for thickness. Atherosclerotic calcifications of the carotid bifurcations. Visualized lung fields are clear.     IMPRESSION: HEAD CT: 1.  No CT evidence for acute intracranial process. 2.  Brain atrophy and presumed chronic microvascular ischemic changes as above. CERVICAL SPINE CT: 1.  Nondisplaced fracture of the right transverse process at C3. This involves the right transverse foramen. CTA of the neck is advised to exclude associated vascular injury at this level. 2.  No additional fracture or posttraumatic subluxation. 3.  Multilevel cervical fusion without  hardware complication. 4.  Multilevel cervical spondylosis as above. [Critical Result: Acute cervical spine fracture] Finding was identified on 6/28/2022 2:11 PM. JOSUÉ Frank was contacted by me on 6/28/2022 2:25 PM and verbalized understanding of the critical result.       ASSESSMENT:   1. Melena - Concern for UGIB. Possible PUD, gastritis, esophagitis. ?underlying chronic liver disease. Mildly low plts. Elevated liver enz. ?portal HTN. Hgb 12.5 --> 10. Elevated BUN. Other consideration for LGIB; ?ischemia with syncopal episode.  2. Anemia - Acute. GIB contributing. Macrocytosis, unclear etiology.  3. GERD - Assoc dysphagia  4. Cervical fracture - No vascular injury on imaging.  5. Elevated liver enzymes - Hx hepatic steatosis. ?EtOH hx. Ischemic.     PLAN:  -NPO  -IV PPI BID  -EGD in OR with MAC today.  -Check acute hep panel, vit B12, folate levels.  -Pending EGD findings, may perform abdominal CT imaging.    Approximately 25 minutes of total time was spent providing patient care including patient evaluation, reviewing documentation/test results, and .             Nils Jaeger MD  Thank you for the opportunity to participate in the care of this patient.   Please feel free to call me with any questions or concerns.  Phone number [phone].            CC: [facility], Kika Leblanc

## 2022-06-29 NOTE — PLAN OF CARE
PRIMARY DIAGNOSIS: GI BLEED    OUTPATIENT/OBSERVATION GOALS TO BE MET BEFORE DISCHARGE  1. Orthostatic performed: N/A    2. Stable Hgb No.   Recent Labs   Lab Test 06/28/22  1938 06/28/22  1333 06/21/19  1134   HGB 10.6* 12.5 13.4       3. Resolved or declined bleeding episodes: No,  with a small amount of bleeding Last episode: 6/29   4. Appropriate testing complete: No     5. Cleared for discharge by consultants (if involved): No    6. Safe discharge environment identified: Yes    Discharge Planner Nurse   Safe discharge environment identified: Yes  Barriers to discharge: Yes       Entered by: Gerry Etienne RN 06/29/2022 3:46 AM     Please review provider order for any additional goals.   Nurse to notify provider when observation goals have been met and patient is ready for discharge.

## 2022-06-29 NOTE — PLAN OF CARE
"PRIMARY DIAGNOSIS: GI BLEED    OUTPATIENT/OBSERVATION GOALS TO BE MET BEFORE DISCHARGE  Orthostatic performed: N/A    Stable Hgb No.   Recent Labs   Lab Test 06/29/22  0654 06/28/22  1938 06/28/22  1333   HGB 10.1*  10.1* 10.6* 12.5       Resolved or declined bleeding episodes: Yes    Appropriate testing complete: Yes    Cleared for discharge by consultants (if involved): No    Safe discharge environment identified: Yes    Received from PACU at 1315 following EGD.  Pt alert and ambulatory.  Denies discomfort other than some coughing that she insisted a Zofran sl tab \"will take care of it\".  Complied with Pt's wishes after brief discussion regarding medical purpose of Zofran and current orders.  Resumed clear liquid diet, other than hot beverages.                                                                              "

## 2022-06-29 NOTE — ANESTHESIA POSTPROCEDURE EVALUATION
Patient: Rachel Schneider    Procedure: Procedure(s):  ESOPHAGOGASTRODUODENOSCOPY (EGD)       Anesthesia Type:  MAC    Note:  Disposition: Outpatient   Postop Pain Control: Uneventful            Sign Out: Well controlled pain   PONV: No   Neuro/Psych: Uneventful            Sign Out: Acceptable/Baseline neuro status   Airway/Respiratory: Uneventful            Sign Out: Acceptable/Baseline resp. status   CV/Hemodynamics: Uneventful            Sign Out: Acceptable CV status; No obvious hypovolemia; No obvious fluid overload   Other NRE: NONE   DID A NON-ROUTINE EVENT OCCUR? No           Last vitals:  Vitals:    06/29/22 0800 06/29/22 1120 06/29/22 1307   BP: 107/63 132/73 139/80   Pulse: 68 69    Resp: 16 16 15   Temp: 36.7  C (98.1  F) 36.6  C (97.8  F) 36.5  C (97.7  F)   SpO2: 100% 99% 100%       Electronically Signed By: Sami Rizvi MD  June 29, 2022  2:24 PM

## 2022-06-29 NOTE — ANESTHESIA CARE TRANSFER NOTE
Patient: Rachel Cruz-Neal    Procedure: Procedure(s):  ESOPHAGOGASTRODUODENOSCOPY (EGD)       Diagnosis: Melena [K92.1]  Diagnosis Additional Information: No value filed.    Anesthesia Type:   MAC     Note:    Oropharynx: oropharynx clear of all foreign objects  Level of Consciousness: awake  Oxygen Supplementation: room air    Independent Airway: airway patency satisfactory and stable  Dentition: dentition unchanged  Vital Signs Stable: post-procedure vital signs reviewed and stable  Report to RN Given: handoff report given  Patient transferred to: Medical/Surgical Unit    Handoff Report: Identifed the Patient, Identified the Reponsible Provider, Reviewed the pertinent medical history, Discussed the surgical course, Reviewed Intra-OP anesthesia mangement and issues during anesthesia, Set expectations for post-procedure period and Allowed opportunity for questions and acknowledgement of understanding      Vitals:  Vitals Value Taken Time   /80 06/29/22 1307   Temp 36.5  C (97.7  F) 06/29/22 1307   Pulse 119    Resp 15 06/29/22 1307   SpO2 100 % 06/29/22 1307       Electronically Signed By: HARRISON Montoya CRNA  June 29, 2022  1:08 PM

## 2022-06-29 NOTE — CONSULTS
[facility]    Neurosurgery Consultation     Date of Admission:  6/28/2022  Date of Consult (When I saw the patient): 06/29/22    Assessment & Plan   Rachel Schneider is a 56 year old female who was admitted on 6/28/2022. I was asked to see the patient for C3 fracture.    Principal Problem:    Melena    Hyperkalemia    Hyponatremia    Abnormal LFTs    Asthma    Hepatic steatosis    Chronic low back pain    Depression    Fibromyalgia    HTN (hypertension)    ARJUN (acute kidney injury) (H)    C3 cervical fracture (H)    Plan:   Collar to be worn at all times  Will obtain baseline cervical xray today   Okay to advance activity as tolerated with PT/OT  Otherwise no further neurosurgical intervention presently indicated - will sign off please re-consult if indicated  Planned follow up in 2 weeks with repeat cervical xray           I have discussed the following assessment and plan with  who is in agreement with initial plan and will follow up with further consultation recommendations.    Sandra Daniel PA-C  [facility]surgery  O: [phone]          Code Status    Full Code    Reason for Consult   Reason for consult: I was asked by  to evaluate this patient for C3 right transverse process fracture.    Primary Care Physician   Kika Leblanc    Chief Complaint   Neck pain s/p fall     History is obtained from the patient    History of Present Illness   Rachel Schneider is a 56 year old female with history of anxiety/depression, mood disorder, chronic low back pain, fibromyalgia, hypertension, hepatic steatosis, asthma presented to ED for further evaluation of black stool. She also was noted to have posterior cervical neck pain and fall 3 days ago. She states that she was walking from her deck into her house when she fell landing onto her Yeti cup with the left side of her neck and shoulder. She was noted to have right C3 transverse process fracture after  fall. She had complaint of lightheaded and dizziness that whole day and states that she recently increased her HTN medications as she was scheduled for right hip replacement June 16 and surgery was canceled secondary to elevated BP. She states that following her fall she has since had posterior cervical neck pain but denies radicular upper extremity pain, numbness, tingling, or weakness. She notes headache and continued dizziness the fall that lasted a few days but has since improved. She denies any nausea or emesis. She denies gait instability but notes that her right hip will catch on her on occasion and feels it may of caused her to fall. She denies urinary incontinence.     Past Medical History   I have reviewed this patient's medical history and updated it with pertinent information if needed.   Past Medical History:   Diagnosis Date     Asthma      Chronic low back pain      Depression      Fibromyalgia      Hepatic steatosis      HTN (hypertension)      MRSA infection      Obesity        Past Surgical History   I have reviewed this patient's surgical history and updated it with pertinent information if needed.  Past Surgical History:   Procedure Laterality Date     EXCISE BREAST CYST/FIBROADENOMA/TUMOR/DUCT LESION/NIPPLE LESION/AREOLAR LESION      Description: Breast Surgery Lumpectomy;  Recorded: 07/09/2014;     [facility] REMOVAL GALLBLADDER      Description: Cholecystectomy;  Recorded: 07/09/2014;     [facility] REMOVAL OF TONSILS,<11 Y/O      Description: Tonsillectomy;  Recorded: 07/09/2014;     [facility] CERV SPINE FUSN,ANTER,BELOW C2      Description: Cervical Vertebral Fusion;  Recorded: 07/09/2014;     [facility] GARY W/O FACETEC FORAMOT/[facility]KC 1/2 VRT SEG, CERVICAL      Description: Laminectomy Lumbar;  Recorded: 07/09/2014;     [facility] TOTAL ABDOM HYSTERECTOMY      Description: Hysterectomy;  Recorded: 07/09/2014;       Prior to Admission Medications   Prior to Admission Medications   Prescriptions Last Dose Informant Patient  Reported? Taking?   DULoxetine (CYMBALTA) 60 MG capsule 6/27/2022 at Unknown time  Yes Yes   Sig: Take 120 mg by mouth At Bedtime   albuterol (PROAIR HFA/PROVENTIL HFA/VENTOLIN HFA) 108 (90 Base) MCG/ACT inhaler Unknown at Unknown time  Yes Yes   Sig: Inhale 2 puffs into the lungs every 4 hours as needed   celecoxib (CELEBREX) 200 MG capsule 6/27/2022 at Unknown time  Yes Yes   Sig: Take 400 mg by mouth At Bedtime   fluticasone (FLONASE) 50 MCG/ACT nasal spray 6/28/2022 at Unknown time  Yes Yes   Sig: Spray 2 sprays in nostril daily   lisinopril (ZESTRIL) 20 MG tablet 6/27/2022 at Unknown time  Yes Yes   Sig: Take 20 mg by mouth At Bedtime   loratadine (CLARITIN) 10 MG tablet Unknown at Unknown time  Yes Yes   Sig: Take 10 mg by mouth daily as needed for allergies   ondansetron (ZOFRAN ODT) 4 MG ODT tab Unknown at Unknown time  Yes Yes   Sig: Take 4 mg by mouth every 8 hours as needed for nausea      Facility-Administered Medications: None     Allergies   Allergies   Allergen Reactions     Gabapentin Unknown       Social History   I have reviewed this patient's social history and updated it with pertinent information if needed. Rachel BREE Schneider  reports that she quit smoking about 12 years ago. Her smoking use included cigarettes. She does not have any smokeless tobacco history on file. She reports current alcohol use of about 3.3 - 4.2 standard drinks of alcohol per week.    Family History   I have reviewed this patient's family history and updated it with pertinent information if needed.   Family History   Adopted: Yes   Problem Relation Age of Onset     Breast Cancer Mother        Review of Systems   14 point review of systems negative with exception to HPI     Physical Exam   Temp: 98.1  F (36.7  C) Temp src: Oral BP: 107/63 Pulse: 68   Resp: 16 SpO2: 100 % O2 Device: None (Room air)    Vital Signs with Ranges  Temp:  [97.8  F (36.6  C)-98.1  F (36.7  C)] 98.1  F (36.7  C)  Pulse:  [] 68  Resp:   "[16-21] 16  BP: ()/(50-89) 107/63  SpO2:  [98 %-100 %] 100 %  140 lbs 13.98 oz     , Blood pressure 107/63, pulse 68, temperature 98.1  F (36.7  C), temperature source Oral, resp. rate 16, height 1.6 m (5' 3\"), weight 63.9 kg (140 lb 14 oz), SpO2 100 %, not currently breastfeeding.  140 lbs 13.98 oz  HEENT:  Normocephalic, atraumatic.  PERRLA.  EOM s intact.   Neck:  Supple, non-tender, without lymphadenopathy.  Heart:  No peripheral edema  Lungs:  No SOB  Abdomen:  Soft, non-tender, non-distended.  Normal bowel sounds.  Skin:  Warm and dry, good capillary refill.  Extremities:  Good radial and dorsalis pedis pulses bilaterally, no edema, cyanosis or clubbing.    NEUROLOGICAL EXAMINATION:   Mental status:  Alert and Oriented x 3, speech is fluent.  Cranial nerves:  II-XII intact.   Motor:  Strength is 5/5 throughout the upper and lower extremities  Deltoids:  Right: 5/5   Left:  5/5  Biceps:  Right: 5/5   Left:  5/5  Triceps: Right: 5/5   Left:  5/5                       Wrist Extensors: Right: 5/5   Left:  5/5        Wrist Flexors:Right: 5/5   Left:  5/5             Hand : Right: 5/5   Left:  5/5         Hip Flexor: Right: 5/5   Left:  5/5                 Hip Adductor:Right: 5/5   Left:  5/5               Hip Abductor: Right: 5/5   Left:  5/5              Gastroc Soleus:Right: 5/5   Left:  5/5        Tib/Ant:Right: 5/5   Left:  5/5                        EHL:Right: 5/5   Left:  5/5                     Sensation:  Intact to LT throughout   Reflexes:  Negative Babinski.  Negative Clonus.    Coordination:  Smooth finger to nose testing.   Negative pronator drift.      Cervical examination reveals tenderness to palpation of the cervical spine and paraspinous muscles bilaterally.     Lumbar examination reveals no tenderness of the spine or paraspinous muscles. Straight leg raise is negative bilaterally. Right hip pain to palpation - scheduled hip replacement in near future       Data     CBC RESULTS:   Recent " Labs   Lab Test 06/29/22  0654   WBC 4.1   RBC 2.55*   HGB 10.1*  10.1*   HCT 30.4*   *   MCH 39.2*   MCHC 33.4   RDW 13.6   *     Basic Metabolic Panel:  Lab Results   Component Value Date     06/29/2022     06/29/2022      Lab Results   Component Value Date    POTASSIUM 4.3 06/29/2022    POTASSIUM 4.3 06/29/2022     Lab Results   Component Value Date    CHLORIDE 106 06/29/2022    CHLORIDE 106 06/29/2022     Lab Results   Component Value Date    WILSON 8.2 06/29/2022    WILSON 8.2 06/29/2022     Lab Results   Component Value Date    CO2 21 06/29/2022    CO2 21 06/29/2022     Lab Results   Component Value Date    BUN 25 06/29/2022    BUN 25 06/29/2022     Lab Results   Component Value Date    CR 0.73 06/29/2022    CR 0.73 06/29/2022     Lab Results   Component Value Date    GLC 74 06/29/2022    GLC 74 06/29/2022     INR:  Lab Results   Component Value Date    INR 0.93 06/28/2022    INR 0.91 06/21/2019     CT, CTA, and MRI cervical reviewed noted nondisplaced fracture of the right transverse process at C3 without vertebral artery injury     Head CT negative for acute abnormalities     Sandra Dnaiel PA-C  [facility]  O: [phone]

## 2022-06-30 ENCOUNTER — APPOINTMENT (OUTPATIENT)
Dept: PHYSICAL THERAPY | Facility: HOSPITAL | Age: 57
DRG: 377 | End: 2022-06-30
Attending: INTERNAL MEDICINE
Payer: COMMERCIAL

## 2022-06-30 ENCOUNTER — TELEPHONE (OUTPATIENT)
Dept: NEUROSURGERY | Facility: CLINIC | Age: 57
End: 2022-06-30

## 2022-06-30 DIAGNOSIS — S12.200A C3 CERVICAL FRACTURE (H): Primary | ICD-10-CM

## 2022-06-30 PROBLEM — S12.9XXA CLOSED FRACTURE OF TRANSVERSE PROCESS OF CERVICAL VERTEBRA, INITIAL ENCOUNTER (H): Status: ACTIVE | Noted: 2022-06-30

## 2022-06-30 PROBLEM — N17.9 ACUTE KIDNEY INJURY (H): Status: ACTIVE | Noted: 2022-06-30

## 2022-06-30 LAB
ALBUMIN SERPL-MCNC: 2.9 G/DL (ref 3.5–5)
ALP SERPL-CCNC: 221 U/L (ref 45–120)
ALT SERPL W P-5'-P-CCNC: 79 U/L (ref 0–45)
AST SERPL W P-5'-P-CCNC: 79 U/L (ref 0–40)
BILIRUB DIRECT SERPL-MCNC: 0.2 MG/DL
BILIRUB SERPL-MCNC: 0.5 MG/DL (ref 0–1)
HGB BLD-MCNC: 10.2 G/DL (ref 11.7–15.7)
HGB BLD-MCNC: 9.4 G/DL (ref 11.7–15.7)
MAGNESIUM SERPL-MCNC: 1.6 MG/DL (ref 1.8–2.6)
PATH REPORT.COMMENTS IMP SPEC: NORMAL
PATH REPORT.COMMENTS IMP SPEC: NORMAL
PATH REPORT.FINAL DX SPEC: NORMAL
PATH REPORT.GROSS SPEC: NORMAL
PATH REPORT.MICROSCOPIC SPEC OTHER STN: NORMAL
PATH REPORT.RELEVANT HX SPEC: NORMAL
PHOSPHATE SERPL-MCNC: 2 MG/DL (ref 2.5–4.5)
PHOTO IMAGE: NORMAL
PROT SERPL-MCNC: 5.1 G/DL (ref 6–8)

## 2022-06-30 PROCEDURE — 80076 HEPATIC FUNCTION PANEL: CPT | Performed by: INTERNAL MEDICINE

## 2022-06-30 PROCEDURE — 97162 PT EVAL MOD COMPLEX 30 MIN: CPT | Mod: GP | Performed by: PHYSICAL THERAPIST

## 2022-06-30 PROCEDURE — HZ2ZZZZ DETOXIFICATION SERVICES FOR SUBSTANCE ABUSE TREATMENT: ICD-10-PCS | Performed by: INTERNAL MEDICINE

## 2022-06-30 PROCEDURE — 99223 1ST HOSP IP/OBS HIGH 75: CPT | Performed by: PAIN MEDICINE

## 2022-06-30 PROCEDURE — 36415 COLL VENOUS BLD VENIPUNCTURE: CPT | Performed by: INTERNAL MEDICINE

## 2022-06-30 PROCEDURE — 250N000011 HC RX IP 250 OP 636: Performed by: INTERNAL MEDICINE

## 2022-06-30 PROCEDURE — 250N000013 HC RX MED GY IP 250 OP 250 PS 637: Performed by: INTERNAL MEDICINE

## 2022-06-30 PROCEDURE — C9113 INJ PANTOPRAZOLE SODIUM, VIA: HCPCS | Performed by: INTERNAL MEDICINE

## 2022-06-30 PROCEDURE — 97116 GAIT TRAINING THERAPY: CPT | Mod: GP | Performed by: PHYSICAL THERAPIST

## 2022-06-30 PROCEDURE — 99232 SBSQ HOSP IP/OBS MODERATE 35: CPT | Performed by: INTERNAL MEDICINE

## 2022-06-30 PROCEDURE — 96376 TX/PRO/DX INJ SAME DRUG ADON: CPT

## 2022-06-30 PROCEDURE — G0378 HOSPITAL OBSERVATION PER HR: HCPCS

## 2022-06-30 PROCEDURE — 88342 IMHCHEM/IMCYTCHM 1ST ANTB: CPT | Mod: 26 | Performed by: PATHOLOGY

## 2022-06-30 PROCEDURE — 84100 ASSAY OF PHOSPHORUS: CPT | Performed by: INTERNAL MEDICINE

## 2022-06-30 PROCEDURE — 120N000001 HC R&B MED SURG/OB

## 2022-06-30 PROCEDURE — 85018 HEMOGLOBIN: CPT | Performed by: INTERNAL MEDICINE

## 2022-06-30 PROCEDURE — 88305 TISSUE EXAM BY PATHOLOGIST: CPT | Mod: 26 | Performed by: PATHOLOGY

## 2022-06-30 PROCEDURE — 83735 ASSAY OF MAGNESIUM: CPT | Performed by: INTERNAL MEDICINE

## 2022-06-30 PROCEDURE — 250N000013 HC RX MED GY IP 250 OP 250 PS 637: Performed by: PAIN MEDICINE

## 2022-06-30 RX ORDER — OLANZAPINE 5 MG/1
5-10 TABLET, ORALLY DISINTEGRATING ORAL EVERY 6 HOURS PRN
Status: DISCONTINUED | OUTPATIENT
Start: 2022-06-30 | End: 2022-07-01 | Stop reason: HOSPADM

## 2022-06-30 RX ORDER — GABAPENTIN 300 MG/1
600 CAPSULE ORAL EVERY 8 HOURS
Status: DISCONTINUED | OUTPATIENT
Start: 2022-07-03 | End: 2022-07-01

## 2022-06-30 RX ORDER — LIDOCAINE 4 G/G
3 PATCH TOPICAL
Status: DISCONTINUED | OUTPATIENT
Start: 2022-06-30 | End: 2022-07-01 | Stop reason: HOSPADM

## 2022-06-30 RX ORDER — FLUMAZENIL 0.1 MG/ML
0.2 INJECTION, SOLUTION INTRAVENOUS
Status: DISCONTINUED | OUTPATIENT
Start: 2022-06-30 | End: 2022-07-01 | Stop reason: HOSPADM

## 2022-06-30 RX ORDER — HALOPERIDOL 5 MG/ML
2.5-5 INJECTION INTRAMUSCULAR EVERY 6 HOURS PRN
Status: DISCONTINUED | OUTPATIENT
Start: 2022-06-30 | End: 2022-07-01 | Stop reason: HOSPADM

## 2022-06-30 RX ORDER — GABAPENTIN 300 MG/1
300 CAPSULE ORAL EVERY 8 HOURS
Status: DISCONTINUED | OUTPATIENT
Start: 2022-07-05 | End: 2022-07-01

## 2022-06-30 RX ORDER — MULTIPLE VITAMINS W/ MINERALS TAB 9MG-400MCG
1 TAB ORAL DAILY
Status: DISCONTINUED | OUTPATIENT
Start: 2022-06-30 | End: 2022-07-01 | Stop reason: HOSPADM

## 2022-06-30 RX ORDER — CLONIDINE HYDROCHLORIDE 0.1 MG/1
0.1 TABLET ORAL EVERY 8 HOURS
Status: DISCONTINUED | OUTPATIENT
Start: 2022-06-30 | End: 2022-07-01 | Stop reason: HOSPADM

## 2022-06-30 RX ORDER — LORAZEPAM 2 MG/ML
1-2 INJECTION INTRAMUSCULAR EVERY 30 MIN PRN
Status: DISCONTINUED | OUTPATIENT
Start: 2022-06-30 | End: 2022-07-01 | Stop reason: HOSPADM

## 2022-06-30 RX ORDER — GABAPENTIN 300 MG/1
1200 CAPSULE ORAL ONCE
Status: COMPLETED | OUTPATIENT
Start: 2022-06-30 | End: 2022-06-30

## 2022-06-30 RX ORDER — FOLIC ACID 1 MG/1
1 TABLET ORAL DAILY
Status: DISCONTINUED | OUTPATIENT
Start: 2022-06-30 | End: 2022-07-01 | Stop reason: HOSPADM

## 2022-06-30 RX ORDER — GABAPENTIN 300 MG/1
900 CAPSULE ORAL EVERY 8 HOURS
Status: DISCONTINUED | OUTPATIENT
Start: 2022-06-30 | End: 2022-07-01

## 2022-06-30 RX ORDER — GABAPENTIN 100 MG/1
100 CAPSULE ORAL EVERY 8 HOURS
Status: DISCONTINUED | OUTPATIENT
Start: 2022-07-07 | End: 2022-07-01

## 2022-06-30 RX ORDER — POLYETHYLENE GLYCOL 3350 17 G/17G
17 POWDER, FOR SOLUTION ORAL DAILY
Status: DISCONTINUED | OUTPATIENT
Start: 2022-06-30 | End: 2022-07-01 | Stop reason: HOSPADM

## 2022-06-30 RX ORDER — LORAZEPAM 1 MG/1
1-2 TABLET ORAL EVERY 30 MIN PRN
Status: DISCONTINUED | OUTPATIENT
Start: 2022-06-30 | End: 2022-07-01 | Stop reason: HOSPADM

## 2022-06-30 RX ADMIN — OXYCODONE HYDROCHLORIDE 5 MG: 5 TABLET ORAL at 00:42

## 2022-06-30 RX ADMIN — CLONIDINE HYDROCHLORIDE 0.1 MG: 0.1 TABLET ORAL at 22:37

## 2022-06-30 RX ADMIN — GABAPENTIN 900 MG: 300 CAPSULE ORAL at 22:36

## 2022-06-30 RX ADMIN — DULOXETINE HYDROCHLORIDE 120 MG: 60 CAPSULE, DELAYED RELEASE PELLETS ORAL at 22:36

## 2022-06-30 RX ADMIN — FLUTICASONE PROPIONATE 2 SPRAY: 50 SPRAY, METERED NASAL at 08:36

## 2022-06-30 RX ADMIN — OXYCODONE HYDROCHLORIDE 5 MG: 5 TABLET ORAL at 04:34

## 2022-06-30 RX ADMIN — FOLIC ACID 1 MG: 1 TABLET ORAL at 16:30

## 2022-06-30 RX ADMIN — GABAPENTIN 1200 MG: 300 CAPSULE ORAL at 16:30

## 2022-06-30 RX ADMIN — CLONIDINE HYDROCHLORIDE 0.1 MG: 0.1 TABLET ORAL at 16:29

## 2022-06-30 RX ADMIN — PANTOPRAZOLE SODIUM 40 MG: 40 INJECTION, POWDER, FOR SOLUTION INTRAVENOUS at 04:34

## 2022-06-30 RX ADMIN — POLYETHYLENE GLYCOL 3350 17 G: 17 POWDER, FOR SOLUTION ORAL at 14:25

## 2022-06-30 RX ADMIN — OXYCODONE HYDROCHLORIDE 5 MG: 5 TABLET ORAL at 12:17

## 2022-06-30 RX ADMIN — Medication 1 TABLET: at 16:30

## 2022-06-30 RX ADMIN — THIAMINE HCL TAB 100 MG 100 MG: 100 TAB at 16:30

## 2022-06-30 RX ADMIN — OXYCODONE HYDROCHLORIDE 5 MG: 5 TABLET ORAL at 08:36

## 2022-06-30 RX ADMIN — PANTOPRAZOLE SODIUM 40 MG: 40 INJECTION, POWDER, FOR SOLUTION INTRAVENOUS at 16:31

## 2022-06-30 RX ADMIN — LIDOCAINE 3 PATCH: 246 PATCH TOPICAL at 14:25

## 2022-06-30 ASSESSMENT — ACTIVITIES OF DAILY LIVING (ADL)
ADLS_ACUITY_SCORE: 19

## 2022-06-30 NOTE — CONSULTS
"[facility] ACUTE PAIN SERVICE CONSULTATION ([facility], [facility])     Date of Admission:  6/28/2022  Date of Consult (When I saw the patient): 06/30/22  Physician requesting consult: Gerry Francois MD   Reason for consult: Intractable neck pain from cervical fracture     Assessment/Plan:     Rachel Schneider is a 56 year old female who was admitted on 6/28/2022.  1 Day Post-Op. I was asked to see the patient for neck pain. Pt fell vs syncopal episode 2 days prior to admission - unsure how or why. Patient reports that she has been feeling overall unwell the whole week. She reports feeling weak, tired, dizzy at times. She states that she has experienced \"bad\" heart burn. Admitted for Closed fracture of transverse process of cervical vertebra, initial encounter, melena, ARJUN. History of anxiety/depression, mood disorder,  HTN, fibromyalgia, hepatic steatosis, ashtma, cervicalgia s/p cervical spine fusion, hysterectomy (s/p 2014), and cholecystectomy (s/p 2014), chronic right hip pain and back pain. Hip was scheduled to be replaced on 6/13 (~15 days ago) but was not performed due to patients high blood pressure. Her PCP doubled her lisinopril and plan was to reschedule surgery if blood pressure improved. Pain began when she fell on Sunday and has persisted in the neck, shoulders, head.. Describes pain as 3/10 and oxycodone does not work well for her. The patient is a former smoker and has a history of ETOH abuse, drinks 4-5 drinks/week per H&P- family is concerned about using more than reported.  Drinking when this happened, ast/alt elevated esophagitis, melana, GI bleed.    EGD 6/29/2022 revealed large grade B reflux esophagitis with benign-appearing esophageal stricture, gastritis as well as nonobstructing nonbleeding duodenal ulcer with pigmented material.  Pathology report is pending.  Gastroenterologist recommended pantoprazole 40 mg IV twice daily with plan to " transition to 40 mg daily at discharge and repeat EGD in 8 weeks to check for healing, full liquid diet, and avoidance of aspirin or NSAIDs.    PLAN:   1) Pain is consistent with acute on chronic pain in setting of C3 transverse process after fall. The patient is opioid naive. Crcl = 75.6 ml/min.  Would not send home with opioids given ongoing concerns for alcohol use disorder. We should order the etoh withdrawal orderset and consult with chem dep regarding options for treatment outpatient.   2)Multimodal Medication Therapy  Topical: could consider patch  Under collar   Adjuvants: APAP 975 mg po q8h on hold by provider, would keep APAP <2g/day in setting of elevated AST/ALT, takes celebrex as home - don't resume d/t melena/ARJUN on admission  Antidepressants/anxiolytics: cymbalta 120 mg po at bedtime   Opioids: oxycodone 5 mg po q4h prn   IV Pain medication: none  3)Non-medication interventions- Ice, Heat, physical therapy, neck collar   4)Constipation Prophylaxis-   needs bowel regimen - add miralax   5) Follow up   -PCP is Kika Leblanc  -Discharge Recommendations - We recommend prescribing the following at the time of discharge: stop home celebrex in setting of esophagitis, melena, ARJUN on admission, f/u N[facility]      [place] -pulled from system on 06/30/22. This indicated no opioid or controlled medication fills in the last 12 months.        History of Present Illness (HPI):       Rachel Schneider is a 56 year old old female who presented after a fall and sustained C3 fx. .  The patient reports that the acute pain is in the neck and shoulder it radiates up her head.  Current pain is rated at 3/10 and goal is 0/10. The patient is with chronic pain, as well- she has pain all over secondary to fibromyalgia.  She also indicates that she was scheduled for hip arthroplasty on June 13 of this year.  It was canceled due to hypertension.  Reviewed  which indicates opioid naïve status.  She does endorse some constipation  no BM since then black tarry stools on Monday.  She does smoke tobacco.  She is interested in quitting.  We talked about this.  She is drinking more than 3 drinks per day throughout the week.  Sometimes wine and sometimes vodka.  She states she uses alcohol to cope with the chronic pain.  Although she notes that she has been drinking heavily since the age of 11.  She is not sure if alcoholism runs in her family as she was adopted.  She notes that her son is now sober.  The patient and I conferenced with her daughter.  The patient specifically requested that I speak with her daughter.  She provided permission to discuss all things regarding her medical care including concern for alcohol use.  Patient is interested in seeking out treatment and states she knows how.  When asked specifically how she would obtain help for treatment she did not know..     Discussed multimodal interventions, interventions other than systemic pharmacologic treatments for chronic pain including:  PT, smoking cessation and impact on pain control.     Review of medical record/Summary of labs and care everywhere. Looked at clinic note and AT/ALT from 3 years ago.     Home pain meds: celecoxib 400 mg po qhs (last filled 200 mg on 4/26/2022 #180 (90 day supply), cymbalta 120 mg po at bedtime, filled 60 mg #180 (90 day supply) last filled 4/26/0222 both from Kika Leblanc, PCP.    Past pain treatments have included gabapentin- allergy noted        The purpose of the care conference was to: review famly concerns of etoh use  The decision maker is: Rachel  Participants in the care conference included: Pain (writer); patient, and Angella (daughter)  Diagnosis: alcohol use disorder   Complications: liver alterations , ARJUN   Prognosis:  Gradual improvement if she can abstain from etoh   Process: The current medial information including test results, various treatment options, risks/benefits/alternative were reviewed. The family (Angella) had an amble  opportunity to ask questions. They asked many questions about labs, liver, etoh treatment options   Patient/Family Goals: Treatment of addiction (inpatient or oupt)  Decisions: will ask chem dep to assist.       Medical History   has a past medical history of Asthma, Chronic low back pain, Depression, Fibromyalgia, Hepatic steatosis, HTN (hypertension), MRSA infection, and Obesity.       Surgical History   has a past surgical history that includes GARY W/O FACETEC FORAMOT/[facility] 1/2 VRT SEG, CERVICAL; REMOVAL GALLBLADDER; TOTAL ABDOM HYSTERECTOMY; CERV SPINE FUSN,ANTER,BELOW C2; REMOVAL OF TONSILS,<11 Y/O; Excise breast cyst/fibroadenoma/tumor/duct lesion/nipple lesion/areolar lesion; and Esophagoscopy, gastroscopy, duodenoscopy (EGD), combined (N/A, 6/29/2022).     Allergies     Allergies   Allergen Reactions     Gabapentin Unknown        Current Home Medications   Prior to Admission medications    Medication Sig Start Date End Date Taking? Authorizing Provider   albuterol (PROAIR HFA/PROVENTIL HFA/VENTOLIN HFA) 108 (90 Base) MCG/ACT inhaler Inhale 2 puffs into the lungs every 4 hours as needed 7/29/14  Yes Reported, Patient   celecoxib (CELEBREX) 200 MG capsule Take 400 mg by mouth At Bedtime   Yes Unknown, Entered By History   DULoxetine (CYMBALTA) 60 MG capsule Take 120 mg by mouth At Bedtime   Yes Unknown, Entered By History   fluticasone (FLONASE) 50 MCG/ACT nasal spray Spray 2 sprays in nostril daily 7/29/14  Yes Reported, Patient   lisinopril (ZESTRIL) 20 MG tablet Take 20 mg by mouth At Bedtime   Yes Unknown, Entered By History   loratadine (CLARITIN) 10 MG tablet Take 10 mg by mouth daily as needed for allergies   Yes Unknown, Entered By History   ondansetron (ZOFRAN ODT) 4 MG ODT tab Take 4 mg by mouth every 8 hours as needed for nausea   Yes Unknown, Entered By History          Social History  Reviewed, and she  reports that she quit smoking about 12 years ago. Her smoking use included cigarettes. She does  not have any smokeless tobacco history on file. She reports current alcohol use of about 3.3 - 4.2 standard drinks of alcohol per week.      Family History- Reviewed care everywhere to find family history- Nothing relevant to pain consult    Reviewed, and family history includes Breast Cancer in her mother. She was adopted.    Review of Systems  Complete ROS reviewed, unless noted  , all other systems reviewed (with patient) and all others found to be negative.         Objective:     Vitals:  B/P: 134/65, T: 97.9, P: 64, R: 20     Weight:   140 lbs 13.98 oz  Body mass index is 24.95 kg/m .      Physical Exam:     General Appearance:  Alert, cooperative, no distress, appears stated age  Patient is sitting up- getting ready to eat    Head:  Normocephalic, without obvious abnormality, atraumatic   Eyes:  Pupils are reactive   ENT/Throat: Lips normal    Lymph/Neck: Supple, symmetrical, trachea midline, no adenopathy, thyroid: not enlarged, symmetric    Lungs:   Clear to auscultation bilaterally, respirations unlabored   Chest Wall:  No tenderness or deformity   Cardiovascular/Heart:  Regular rate and rhythm, S1, S2 normal,no murmur, rub or gallop.     Abdomen:   Soft, non-tender, bowel sounds active all four quadrants,  no masses, no organomegaly   Musculoskeletal: Extremities normal, atraumatic-    Skin: Skin warm and dry    Neurologic: Alert and oriented X 3, Moves all 4 extremities        Psych: Affect is pleasant  Grooming is adequate- nails painted, hair brushed        Imaging Reviewed Personally By Myself    Ct scan neck                                                                    IMPRESSION:  1.  Right C3 transverse process fracture is visualized with mild regional edema. The fracture is better seen on CT earlier today. No additional fractures are visualized.  2.  No evidence of any posterior tension band injury.  3.  Postsurgical changes as above with mild spinal stenosis at C3-C4, C6-C7.              Labs  Reviewed Personally By Myself    Sodium   Date Value Ref Range Status   06/29/2022 135 (L) 136 - 145 mmol/L Final   06/29/2022 135 (L) 136 - 145 mmol/L Final     Potassium   Date Value Ref Range Status   06/29/2022 4.3 3.5 - 5.0 mmol/L Final   06/29/2022 4.3 3.5 - 5.0 mmol/L Final     Chloride   Date Value Ref Range Status   06/29/2022 106 98 - 107 mmol/L Final   06/29/2022 106 98 - 107 mmol/L Final     Carbon Dioxide (CO2)   Date Value Ref Range Status   06/29/2022 21 (L) 22 - 31 mmol/L Final   06/29/2022 21 (L) 22 - 31 mmol/L Final     Anion Gap   Date Value Ref Range Status   06/29/2022 8 5 - 18 mmol/L Final   06/29/2022 8 5 - 18 mmol/L Final     Glucose   Date Value Ref Range Status   06/29/2022 74 70 - 125 mg/dL Final   06/29/2022 74 70 - 125 mg/dL Final     Urea Nitrogen   Date Value Ref Range Status   06/29/2022 25 (H) 8 - 22 mg/dL Final   06/29/2022 25 (H) 8 - 22 mg/dL Final     Creatinine   Date Value Ref Range Status   06/29/2022 0.73 0.60 - 1.10 mg/dL Final   06/29/2022 0.73 0.60 - 1.10 mg/dL Final     GFR Estimate   Date Value Ref Range Status   06/29/2022 >90 >60 mL/min/1.73m2 Final     Comment:     Effective December 21, 2021 eGFRcr in adults is calculated using the 2021 CKD-EPI creatinine equation which includes age and gender ( et al., NEJM, DOI: 10.1056/RVINpa4963941)   06/29/2022 >90 >60 mL/min/1.73m2 Final     Comment:     Effective December 21, 2021 eGFRcr in adults is calculated using the 2021 CKD-EPI creatinine equation which includes age and gender (Jackson et al., NEJM, DOI: 10.1056/FEKZaq4549471)  Effective December 21, 2021 eGFRcr in adults is calculated using the 2021 CKD-EPI creatinine equation which includes age and gender (Jackson clay al., NEJM, DOI: 10.1056/JWBCer0516398)   06/21/2019 >60 >60 mL/min/1.73m2 Final     Calcium   Date Value Ref Range Status   06/29/2022 8.2 (L) 8.5 - 10.5 mg/dL Final   06/29/2022 8.2 (L) 8.5 - 10.5 mg/dL Final             Total time spent 83 minutes with  greater than 50% in consultation, education and coordination of care.     Also discussed with RN, family conference, called MD.     Thank you for this consultation.          Blossom TERRY, CNS-BC, CNP, ACHPN  Acute Care Pain Management Program Hour 7a-1700  [facility] ([place], [place], [place])   Page via [facility]- Click HERE to page Blossom or call [phone]

## 2022-06-30 NOTE — PROGRESS NOTES
[facility]      OUTPATIENT PHYSICAL THERAPY EVALUATION  PLAN OF TREATMENT FOR OUTPATIENT REHABILITATION  (COMPLETE FOR INITIAL CLAIMS ONLY)  Patient's Last Name, First Name, M.I.  YOB: 1965  JennieRachel  L                        Provider's Name  [facility] Medical Record No.  [id number]                               Onset Date:  06/28/22   Start of Care Date:  06/30/22      Type:     _X_PT   ___OT   ___SLP Medical Diagnosis:  C3 cervical fracture                        PT Diagnosis:  impaired functional mobility   Visits from SOC:  1   _________________________________________________________________________________  Plan of Treatment/Functional Goals    Planned Interventions: balance training, bed mobility training, gait training, home exercise program, neuromuscular re-education, patient/family education, strengthening, stair training, transfer training     Goals: See Physical Therapy Goals on Care Plan in [employer] electronic health record.    Therapy Frequency: Daily  Predicted Duration of Therapy Intervention: 07/07/22  _________________________________________________________________________________    I CERTIFY THE NEED FOR THESE SERVICES FURNISHED UNDER        THIS PLAN OF TREATMENT AND WHILE UNDER MY CARE     (Physician co-signature of this document indicates review and certification of the therapy plan).              Certification date from: 06/30/22, Certification date to: 07/07/22    Referring Physician: Dada Rubio DO            Initial Assessment        See Physical Therapy evaluation dated 06/30/22 in Epic electronic health record.

## 2022-06-30 NOTE — PROGRESS NOTES
Neurosurgery Treatment Plan:   Reviewed patients chart  Per nursing patient during well has no current complaints of neck or upper extremity pain, good strength in all extremities.  EGD completed yesterday   Hgb improving        Images:   Xray reviewed with stable appearance of cervical alignment      Plan:   No current neurosurgical intervention presently indicated  Continue to wear collar at all times  Will sign off please re-consult   Outpatient follow up in 2 weeks with repeat cervical xray at that time      Sandra Moise PA-C  [facility] Neurosurgery  O: [phone]

## 2022-06-30 NOTE — TELEPHONE ENCOUNTER
PATIENT NAME:  Rachel Schneider  YOB: 1965  MRN: [id number]  SURGEON: Dr. Wrigth  DATE of CONSULT: 06/29/2022  Consult for C3 fracture    FOLLOW-UP PLAN:    Hospital Follow Up Visit: 2 weeks  Provider: ZABRINA    DIAGNOSTICS: XR  DISPOSITION:  Home 06/30/2022    ADDITIONAL INSTRUCTIONS FOR MEDICAL STAFF:      Luisa Chris RN, CNRN

## 2022-06-30 NOTE — PROGRESS NOTES
"   06/30/22 4106   Quick Adds   Quick Adds Certification   Type of Visit Initial PT Evaluation   Living Environment   People in Home alone   Current Living Arrangements condominium  ([place]+ community)   Home Accessibility stairs to enter home   Number of Stairs, Main Entrance 1   Stair Railings, Main Entrance railings safe and in good condition  (grab bar)   Self-Care   Equipment Currently Used at Home cane, straight;walker, rolling   Fall history within last six months yes   Number of times patient has fallen within last six months 3   Activity/Exercise/Self-Care Comment Pt independent with ADLs at baseline. States mobility has been more difficult over last few months due to R hip pain.   General Information   Onset of Illness/Injury or Date of Surgery 06/28/22   Referring Physician Dada Rubio DO   Patient/Family Therapy Goals Statement (PT) None stated.   Pertinent History of Current Problem (include personal factors and/or comorbidities that impact the POC) Per H&P: \"56-year-old female with history of anxiety/depression, mood disorder, chronic low back pain, fibromyalgia, hypertension, hepatic steatosis, asthma who presents with melena and C3 transverse process fracture after fall.\"   Existing Precautions/Restrictions fall;brace worn when out of bed  (cervical collar at all times)   Pain Assessment   Patient Currently in Pain Yes, see Vital Sign flowsheet  (RN present and aware)   Range of Motion (ROM)   ROM Comment B LE ROM WFL, c-spine in cervical collar   Strength (Manual Muscle Testing)   Strength (Manual Muscle Testing) strength is WFL   Bed Mobility   Bed Mobility supine-sit;sit-supine   Supine-Sit [place] (Bed Mobility) supervision;verbal cues   Sit-Supine [place] (Bed Mobility) supervision;verbal cues   Impairments Contributing to Impaired Bed Mobility decreased ROM;pain   Transfers   Transfers sit-stand transfer   Sit-Stand Transfer   Sit-Stand [place] (Transfers) supervision "   Gait/Stairs (Locomotion)   [place] Level (Gait) supervision   Assistive Device (Gait) walker, front-wheeled   Distance in Feet (Required for LE Total Joints) 30'   Pattern (Gait) step-through   Deviations/Abnormal Patterns (Gait) raheem decreased;gait speed decreased   Clinical Impression   Criteria for Skilled Therapeutic Intervention Yes, treatment indicated   PT Diagnosis (PT) impaired functional mobility   Influenced by the following impairments decreased strength, ROM, pain, impaired balance   Functional limitations due to impairments transfers, ambulation   Clinical Presentation (PT Evaluation Complexity) Evolving/Changing   Clinical Presentation Rationale Pt presents as medically diagnosed.   Clinical Decision Making (Complexity) moderate complexity   Planned Therapy Interventions (PT) balance training;bed mobility training;gait training;home exercise program;neuromuscular re-education;patient/family education;strengthening;stair training;transfer training   Risk & Benefits of therapy have been explained evaluation/treatment results reviewed;participants voiced agreement with care plan;participants included;patient   PT Discharge Planning   PT Discharge Recommendation (DC Rec) home with assist   PT Rationale for DC Rec Patient moving well with stand-by to contact guard assist. Pt reports son and neighbors are available to assist her as needed and could check on patient daily.   Plan of Care Review   Plan of Care Reviewed With patient   Therapy Certification   Start of care date 06/30/22   Certification date from 06/30/22   Certification date to 07/07/22   Medical Diagnosis C3 cervical fracture   Total Evaluation Time   Total Evaluation Time (Minutes) 10   Physical Therapy Goals   PT Frequency Daily   PT Predicted Duration/Target Date for Goal Attainment 07/07/22   PT Goals Bed Mobility;Transfers;Gait   PT: Bed Mobility Modified independent;Supine to/from sit;Within precautions   PT: Transfers Modified  independent;Sit to/from stand;Assistive device   PT: Gait Supervision/stand-by assist;Assistive device;Rolling walker;Greater than 200 feet     Deepika Escobedo, PT  6/30/2022

## 2022-06-30 NOTE — PLAN OF CARE
PRIMARY DIAGNOSIS: GI BLEED    OUTPATIENT/OBSERVATION GOALS TO BE MET BEFORE DISCHARGE  Orthostatic performed: N/A    Stable Hgb Yes.   Recent Labs   Lab Test 06/29/22  1856 06/29/22  0654 06/28/22  1938   HGB 10.3* 10.1*  10.1* 10.6*       Resolved or declined bleeding episodes: Yes Last episode: 6/28    Appropriate testing complete: Yes    Cleared for discharge by consultants (if involved): Yes    Safe discharge environment identified: Yes    Discharge Planner Nurse   Safe discharge environment identified: Yes  Barriers to discharge: Yes, stable hemoglobin, Trending LFTs.        Entered by: Francia Elena RN 06/30/2022 5:53 AM     Please review provider order for any additional goals.   Nurse to notify provider when observation goals have been met and patient is ready for discharge.Goal Outcome Evaluation:

## 2022-06-30 NOTE — PROGRESS NOTES
[facility] Progress Note - Hospitalist Service    Date of Admission:  6/28/2022  Assessment & Plan        Rachel Oseitanvipedro is a 56-year-old female with history of anxiety/depression, mood disorder, chronic low back pain, fibromyalgia, hypertension, hepatic steatosis, asthma admitted on 6/29/2022 due to C3 transverse process fracture after fall as well as syncope possibly due to GI bleed.  Patient endorsed history of melena.    EGD 6/29/2022 revealed large grade B reflux esophagitis with benign-appearing esophageal stricture, gastritis as well as nonobstructing nonbleeding duodenal ulcer with pigmented material.  Pathology report is pending.  Gastroenterologist recommended pantoprazole 40 mg IV twice daily with plan to transition to 40 mg daily at discharge and repeat EGD in 8 weeks to check for healing, full liquid diet, and avoidance of aspirin or NSAIDs.    Suspected GI bleed  EGD 6/29/2022 revealed large grade B reflux esophagitis  Acute Blood Loss Anemia    EGD 6/29/2022 revealed large grade B reflux esophagitis with benign-appearing esophageal stricture, gastritis as well as nonobstructing nonbleeding duodenal ulcer with pigmented material.  Pathology report is pending.  Gastroenterologist recommended pantoprazole 40 mg IV twice daily with plan to transition to 40 mg daily at discharge and repeat EGD in 8 weeks to check for healing, full liquid diet, and avoidance of aspirin or NSAIDs.  Hemoglobin dropped from 12.5 to 10.1.    -- Avoid aspirin or NSAIDs  -- Continue pantoprazole 40 mg IV every 12 hours  --  Continue clear liquid diet  -- Hemoglobin check every 12 hours, transfuse for hemoglobin less than 7  --  Gastroenterology following-appreciate assistance        Fall with right C3 transverse process fracture:    Possibly sustained when she had syncope on 6/26/2022 versus relative hypotension from acute blood loss.   CTA negative for vertebral artery injury  CT head  negative for any acute process.    CT cervical spine shows nondisplaced fracture right transverse process at C3.  Cervical MRI showed right C3 transverse process fracture with mild regional edema as well as postsurgical changes as above with mild spinal stenosis at C3-C4, C6-C7.    --having significant neck pain   --Neurosurgery team was consulted on admission; recommended cervical collar at all times  -- PT/OT  -- Pain management as per pain management service; will not be a candidate for outpatient opoid given heavy alcohol use as per pain management service         Syncope  Possibly related to relative hypotension from acute blood loss  EKG with nonspecific findings  Head CT negative  CTA shows  50% stenosis right vertebral artery V1 segment secondary to atherosclerotic plaque and mild left carotid stenosis.  -- monitor on telemetry for any arrhyhtmia   --Continue Zocor 20mg qhs   -- Continue to hold home lisinopril for now        RAJUN  Resolved  -- Continue to Hold home lisinopril  --off IV fluids  -- Monitor BMP        Mild hyperkalemia   Resolved  Possibly related to ARJUN from volume depletion and lisinopril use  -- Stop IV hydration  --Continue to hold lisinopril as above        History of hepatic steatosis  Transaminitis  Liver enzymes including ALP, ALT, AST are worsening.  Endorsed regular alcohol consumption.  --Hold simvastatin and Tylenol for now  --Trend LFT  -- Consider abdominal imaging if LFT is worsening  --Check Tylenol level  --Abstain from alcohol consumption  -- GI following-appreciate assistance       Chronic radicular low back pain, fibromyalgia, history of anxiety/depression:  --Holding home Celebrex as above  -- Continue home Cymbalta     Asthma  Continue PTA fluticasone  DuoNebs as needed  Supplemental oxygen as needed    Alcohol abuse/waithdrawal   Monitor for withdrawal as per CIWA protocol      Diet: Low Fiber Diet    DVT Prophylaxis: Pneumatic Compression Devices  [place]dez Catheter: Not  present  Central Lines: None  Cardiac Monitoring: None  Code Status: Full Code      Disposition Plan      Expected Discharge Date: 07/01/2022      Destination: home   few days     The patient's care was discussed with the Patient and daughter at bedside    Gerry Francois MD  Hospitalist Service  [facility]  Securely message with the Vocera Web Console (learn more here)  Text page via [employer] Paging/Directory       Clinically Significant Risk Factors Present on Admission             # Hypoalbuminemia: Albumin = 2.9 g/dL (Ref range: 3.5 - 5.0 g/dL) on admission, will monitor as appropriate     # Hypertension: home medication list includes antihypertensive(s)          ______________________________________________________________________    Interval History   She complains of significant neck pain. She denies abdominal pain, nausea, vomiting, fever or chills.    Data reviewed today: I reviewed all medications, new labs and imaging results over the last 24 hours. I personally reviewed no images or EKG's today.    Physical Exam   Vital Signs: Temp: 97.9  F (36.6  C) Temp src: Oral BP: 134/65 Pulse: 64   Resp: 20 SpO2: 98 % O2 Device: None (Room air)    Weight: 140 lbs 13.98 oz    General appearance: Awake, Alert, Cooperative, not in any obvious distress and appears stated age   HEENT: Normocephalic, atraumatic, conjunctiva clear without icterus and ears without discharge  Neck: Neck collar in place  Lungs: Clear to auscultation bilaterally, no wheezing, good air exchange, normal work of breathing  Cardiovascular: Regular Rate and Rythm, normal apical impulse, normal S1 and S2, no lower extremity edema bilaterally  Abdomen: Soft, non-tender and Non-distended, active bowel sounds  Skin: Skin color, texture normal and bruising or bleeding. No rashes or lesions over face, neck, arms and legs, turgor normal.  Musculoskeletal: No bony deformities or joint tenderness. Normal ROM upon flexion &  extension.   Neurologic: Alert & Oriented X 3, Facial symmetry preserved and upper & lower extremities moving well with symmetry  Psychiatric: Calm, normal eye contact and normal affect      Data   No results found for this or any previous visit (from the past 24 hour(s)).

## 2022-06-30 NOTE — PROGRESS NOTES
Care Management Follow Up    Length of Stay (days): 0    Expected Discharge Date: 07/01/2022     Concerns to be Addressed:       Patient plan of care discussed at interdisciplinary rounds: Yes    Anticipated Discharge Disposition:  Home with family     Anticipated Discharge Services:  Pt declines  Anticipated Discharge DME:      Patient/family educated on Medicare website which has current facility and service quality ratings:    Education Provided on the Discharge Plan:    Patient/Family in Agreement with the Plan:      Referrals Placed by CM/MICHAEL:    Private pay costs discussed: Not applicable    Additional Information:  SW met with pt, she has completed her ACP and was enquiring about a notary. CM has no notary today.   Pt plans to go home with friends and family to help. She is declining any home care.      SOFIA Corral

## 2022-06-30 NOTE — PROGRESS NOTES
PRIMARY DIAGNOSIS: GI BLEED    OUTPATIENT/OBSERVATION GOALS TO BE MET BEFORE DISCHARGE  1. Orthostatic performed: No    2. Stable Hgb Yes.   Recent Labs   Lab Test 06/30/22  0625 06/29/22  1856 06/29/22  0654   HGB 9.4* 10.3* 10.1*  10.1*       3. Resolved or declined bleeding episodes: Yes Last episode: Yesterday    4. Appropriate testing complete: Yes    5. Cleared for discharge by consultants (if involved): Yes per neurosurgery and GI. No per attending    6. Safe discharge environment identified: Yes    Discharge Planner Nurse   Safe discharge environment identified: Yes  Barriers to discharge: Home Pt/Ot requested by patient and recommended by therapy       Entered by: Hillary Lim RN 06/30/2022 12:20 PM     Please review provider order for any additional goals.   Nurse to notify provider when observation goals have been met and patient is ready for discharge.

## 2022-06-30 NOTE — PLAN OF CARE
PRIMARY DIAGNOSIS: ACUTE PAIN  OUTPATIENT/OBSERVATION GOALS TO BE MET BEFORE DISCHARGE:  1. Pain Status: Improved-controlled with oral pain medications.    2. Return to near baseline physical activity: Yes    3. Cleared for discharge by consultants (if involved): No    Discharge Planner Nurse   Safe discharge environment identified: Yes  Barriers to discharge: Yes       Entered by: Sofía Ag RN 06/30/2022 3:09 PM   Lidocaine patches applied to neck and shoulders.  Will continue to monitor.  Please review provider order for any additional goals.   Nurse to notify provider when observation goals have been met and patient is ready for discharge.Goal Outcome Evaluation:

## 2022-06-30 NOTE — PROVIDER NOTIFICATION
Pt requesting for something for pain, its too early to give oxycodone, paged Dr. David to request for an alternative.

## 2022-06-30 NOTE — PROGRESS NOTES
[facility] Progress Note    Subjective:  Pt reports feeling better. Denies abdominal pain. Reports neck pain overnight. Tolerating liquid diet. Denies BM, melena, hematochezia.    Objective:  Vital signs in last 24 hours  Temp:  [97.4  F (36.3  C)-97.9  F (36.6  C)] 97.9  F (36.6  C)  Pulse:  [61-71] 64  Resp:  [15-20] 20  BP: (124-161)/(58-80) 134/65  SpO2:  [96 %-100 %] 98 % O2 Device: None (Room air)      Gen: A&Ox3, NAD  Eyes: No icterus  Neck: C-collar in place  Gastrointestinal: Soft, NTTP, no rebound or guarding  Extrem: PPI, no c/c/e      LABORATORY    ELECTROLYTE PANEL   Recent Labs   Lab 06/29/22  0655 06/28/22  1333   *  135* 132*   POTASSIUM 4.3  4.3 5.2*   CHLORIDE 106  106 96*   CO2 21*  21* 25   GLC 74  74 97   CR 0.73  0.73 1.13*   BUN 25*  25* 38*      HEMATOLOGY PANEL   Recent Labs   Lab 06/30/22  0625 06/29/22  1856 06/29/22  0654 06/28/22  1938 06/28/22  1333   HGB 9.4* 10.3* 10.1*  10.1*   < > 12.5   MCV  --   --  119*  --  114*   WBC  --   --  4.1  --  6.2   PLT  --   --  142*  --  202   INR  --   --   --   --  0.93    < > = values in this interval not displayed.      LIVER AND PANCREAS PANEL   Recent Labs   Lab 06/30/22  0625 06/29/22  0655 06/28/22  1333   AST 79* 205* 43*   ALT 79* 99* 54*   ALKPHOS 221* 229* 160*   BILITOTAL 0.5 0.4 0.7     IMAGING STUDIES    XR Cervical Spine 2/3 Views    Result Date: 6/29/2022  EXAM: XR CERVICAL SPINE 2/3 VWS LOCATION: [facility] DATE/TIME: 6/29/2022 3:16 PM INDICATION: Follow-up right C3 transverse process fracture COMPARISON: CT cervical spine 06/20/2022 TECHNIQUE: CR Cervical Spine.     IMPRESSION: The known fracture of the right C3 transverse process is not well redemonstrated radiographically. Stable normal vertebral body heights with unchanged alignment. No interval acute fracture or traumatic subluxation. Redemonstration of CDS at C4-C7, without hardware complication. Mild interbody degenerative  change at C3-C4 and mild to moderate multilevel degenerative facet arthropathy. Normal appearance of the anterior C1-C2 articulation. No prevertebral edema. Visualized lung apices are unremarkable.     MR CERVICAL SPINE W/O CONTRAST    Result Date: 6/28/2022  EXAM: MR CERVICAL SPINE W/O CONTRAST LOCATION: [facility] DATE/TIME: 6/28/2022 7:54 PM INDICATION: Neck pain; Trauma; Mild moderate trauma; Cervical CT result available; r o Fracture; No known automatically detected potential contraindications to MRI COMPARISON: CT cervical spine 06/28/2022 TECHNIQUE: MRI Cervical Spine without IV contrast. FINDINGS: Alignment: Normal. Anterior discectomy and solid interbody fusion changes with anterior hardware instrumentation from C4 through C7. Susceptibility artifact related to the hardware limits hardware assessment. Normal vertebral body heights. The known C3 transverse process fracture is visualized and mild regional edema. It is better seen on the CT performed earlier in the day. Marrow signal: Normal. No extraspinal abnormality. Craniovertebral junction: Normal. C1-2: Normal. C2-3: Slight loss of disc height. Slight focal midline disc osteophyte. Left uncovertebral hypertrophy. Moderate left facet hypertrophy. No central spinal stenosis. Right foramen: no stenosis. Left foramen: moderate stenosis. C3-4: Slight loss of disc height. Broad based disc osteophyte complex. No uncovertebral hypertrophy. Mild bilateral facet hypertrophy. Mild central spinal stenosis. Right foramen: moderate stenosis. Left foramen: moderate stenosis. C4-5:  Status post ACDF. Broad based disc osteophyte complex. No uncovertebral hypertrophy. Mild right facet hypertrophy. No central spinal stenosis. Right foramen: no stenosis. Left foramen: no stenosis. C5-6: Status post ACDF. Broad based disc osteophyte complex. No uncovertebral hypertrophy. Mild right facet hypertrophy. No central spinal stenosis. Right foramen: mild to  moderate stenosis. Left foramen: mild to moderate stenosis. C6-7: Status post ACDF. Broad based disc osteophyte complex. Bilateral uncovertebral hypertrophy. Mild right facet hypertrophy. Mild central spinal stenosis. Right foramen: moderate stenosis. Left foramen: moderate stenosis. C7-T1: Normal height of disc. No disc herniation. No uncovertebral hypertrophy. Normal facet joints. No central spinal stenosis. Right foramen: no stenosis. Left foramen: no stenosis.     IMPRESSION: 1.  Right C3 transverse process fracture is visualized with mild regional edema. The fracture is better seen on CT earlier today. No additional fractures are visualized. 2.  No evidence of any posterior tension band injury. 3.  Postsurgical changes as above with mild spinal stenosis at C3-C4, C6-C7.    CTA Neck with Contrast    Result Date: 6/28/2022  EXAM: CTA NECK WITH CONTRAST LOCATION: [facility] DATE/TIME: 6/28/2022 3:06 PM INDICATION: History of C3 transverse process fracture, CT performed to evaluate for vessel injury. COMPARISON: CT cervical spine 6/28/2022 CONTRAST: IsoVue 370 75mL TECHNIQUE: Neck CT angiogram with IV contrast. Axial helical CT images of the neck vessels were obtained during the arterial phase of intravenous contrast administration. Axial helical 2D reconstructed images and multiplanar 3D MIP reconstructed images of the neck vessels were performed by the technologist. Dose reduction techniques were used. All stenosis measurements made according to NASCET criteria unless otherwise specified. FINDINGS: RIGHT CAROTID: No measurable stenosis or dissection. LEFT CAROTID: Mild 30% stenosis secondary to coarsely calcified atherosclerotic plaque. No dissection. VERTEBRAL ARTERIES: No evidence of dissection. Right-side dominant vertebral arterial system. 50% stenosis right vertebral artery V1 segment secondary to atherosclerotic plaque. AORTIC ARCH: Classic aortic arch anatomy with no significant  stenosis at the origin of the great vessels. NONVASCULAR STRUCTURES: Mild centrilobular emphysematous changes. Cervical spinal fusion, right C3 transverse process fracture and spondylosis as described on the comparison examination. Probable 2 mm nodule left thyroid lobe, partially obscured by hardware artifact.     IMPRESSION: No evidence of vertebral artery injury associated with the nondisplaced C3 right transverse process fracture. 50% stenosis right vertebral artery V1 segment secondary to atherosclerotic plaque. Mild left carotid stenosis. Probable 2 mm nodule left thyroid lobe, partially obscured by hardware artifact. This is of doubtful clinical significance in a patient of this age.    Cervical spine CT w/o contrast    Result Date: 6/28/2022  EXAM: CT HEAD W/O CONTRAST, CT CERVICAL SPINE W/O CONTRAST LOCATION: [facility] DATE/TIME: 6/28/2022 1:50 PM INDICATION: Multiple recent falls. Left-sided neck pain. COMPARISON: None. TECHNIQUE: 1) Routine CT Head without IV contrast. Multiplanar reformats. Dose reduction techniques were used. 2) Routine CT Cervical Spine without IV contrast. Multiplanar reformats. Dose reduction techniques were used. FINDINGS: HEAD CT: INTRACRANIAL CONTENTS: No intracranial hemorrhage, extraaxial collection, or mass effect.  No CT evidence of acute infarct. Mild presumed chronic small vessel ischemic changes. Minor cerebral volume loss. No hydrocephalus. VISUALIZED ORBITS/SINUSES/MASTOIDS: No intraorbital abnormality. No paranasal sinus mucosal disease. No middle ear or mastoid effusion. BONES/SOFT TISSUES: No scalp hematoma. No skull fracture. CERVICAL SPINE CT: VERTEBRA: Straightened cervical lordosis. 2 mm degenerative type anterolisthesis at C2-C3 and C3-C4. Preserved vertebral body heights. Anterior discectomy and solid interbody fusion changes with anterior hardware instrumentation from C4 through C7. No evidence for hardware complication. Nondisplaced  right transverse process fracture involving the right transverse foramen at C3. No additional fracture or posttraumatic subluxation identified. CANAL/FORAMINA: Mild multilevel cervical spondylosis. No CT evidence for high-grade central spinal canal stenosis. Moderate to severe right neural foraminal stenosis at C3-C4. Mild to moderate bilateral neural foraminal stenosis at C5-C6 and C6-C7, right  greater than left. PARASPINAL: Prevertebral soft tissues within normal limits for thickness. Atherosclerotic calcifications of the carotid bifurcations. Visualized lung fields are clear.     IMPRESSION: HEAD CT: 1.  No CT evidence for acute intracranial process. 2.  Brain atrophy and presumed chronic microvascular ischemic changes as above. CERVICAL SPINE CT: 1.  Nondisplaced fracture of the right transverse process at C3. This involves the right transverse foramen. CTA of the neck is advised to exclude associated vascular injury at this level. 2.  No additional fracture or posttraumatic subluxation. 3.  Multilevel cervical fusion without hardware complication. 4.  Multilevel cervical spondylosis as above. [Critical Result: Acute cervical spine fracture] Finding was identified on 6/28/2022 2:11 PM. JOSUÉ Frank was contacted by me on 6/28/2022 2:25 PM and verbalized understanding of the critical result.     Head CT w/o contrast    Result Date: 6/28/2022  EXAM: CT HEAD W/O CONTRAST, CT CERVICAL SPINE W/O CONTRAST LOCATION: [facility] DATE/TIME: 6/28/2022 1:50 PM INDICATION: Multiple recent falls. Left-sided neck pain. COMPARISON: None. TECHNIQUE: 1) Routine CT Head without IV contrast. Multiplanar reformats. Dose reduction techniques were used. 2) Routine CT Cervical Spine without IV contrast. Multiplanar reformats. Dose reduction techniques were used. FINDINGS: HEAD CT: INTRACRANIAL CONTENTS: No intracranial hemorrhage, extraaxial collection, or mass effect.  No CT evidence of acute infarct. Mild  presumed chronic small vessel ischemic changes. Minor cerebral volume loss. No hydrocephalus. VISUALIZED ORBITS/SINUSES/MASTOIDS: No intraorbital abnormality. No paranasal sinus mucosal disease. No middle ear or mastoid effusion. BONES/SOFT TISSUES: No scalp hematoma. No skull fracture. CERVICAL SPINE CT: VERTEBRA: Straightened cervical lordosis. 2 mm degenerative type anterolisthesis at C2-C3 and C3-C4. Preserved vertebral body heights. Anterior discectomy and solid interbody fusion changes with anterior hardware instrumentation from C4 through C7. No evidence for hardware complication. Nondisplaced right transverse process fracture involving the right transverse foramen at C3. No additional fracture or posttraumatic subluxation identified. CANAL/FORAMINA: Mild multilevel cervical spondylosis. No CT evidence for high-grade central spinal canal stenosis. Moderate to severe right neural foraminal stenosis at C3-C4. Mild to moderate bilateral neural foraminal stenosis at C5-C6 and C6-C7, right  greater than left. PARASPINAL: Prevertebral soft tissues within normal limits for thickness. Atherosclerotic calcifications of the carotid bifurcations. Visualized lung fields are clear.     IMPRESSION: HEAD CT: 1.  No CT evidence for acute intracranial process. 2.  Brain atrophy and presumed chronic microvascular ischemic changes as above. CERVICAL SPINE CT: 1.  Nondisplaced fracture of the right transverse process at C3. This involves the right transverse foramen. CTA of the neck is advised to exclude associated vascular injury at this level. 2.  No additional fracture or posttraumatic subluxation. 3.  Multilevel cervical fusion without hardware complication. 4.  Multilevel cervical spondylosis as above. [Critical Result: Acute cervical spine fracture] Finding was identified on 6/28/2022 2:11 PM. JOSUÉ Frank was contacted by me on 6/28/2022 2:25 PM and verbalized understanding of the critical result.       I have reviewed the  current diagnostic and laboratory tests.              Acute hepatitis panel negative    Folate, B12 nml      EGD  - LA Grade B reflux esophagitis with no bleeding.   - Benign-appearing esophageal stricture. Biopsied.   - Gastritis. Biopsied.   - Non-obstructing non-bleeding duodenal ulcer with pigmented material. Injected. Treated with bipolar cautery.   - Normal second portion of the duodenum.   Path pending      Assessment:  1. Melena - Duodenal ulcer with stigmata of bleeding. Treated endoscopically. Stable. On PPI  2. Anemia - Acute. GIB contributing. Macrocytosis. Hgb 9.4 (down from 10.3)  3. GERD - Assoc dysphagia  4. Cervical fracture - No vascular injury on imaging.  5. Elevated liver enzymes - Hx hepatic steatosis. ?EtOH likely contributing.      Patient Active Problem List   Diagnosis     Enthesopathy of hip region     Backache     Cervicalgia     Melena     C3 cervical fracture (H)     ARJUN (acute kidney injury) (H)     Hyperkalemia     Hyponatremia     Abnormal LFTs     Asthma     Hepatic steatosis     Chronic low back pain     Depression     Fibromyalgia     HTN (hypertension)     Obesity       Plan:  -Await pathology results.   -Follow an antireflux regimen.   -No aspirin, ibuprofen, naproxen, or other non-steroidal anti-inflammatory drugs.   -Continue pantoprazole 40 mg IV BID as inpatient. Transition to 40 mg ONCE daily by mouth at discharge.   -Low fiber diet.  -Importance of EtOH abstinence addressed.  -Recheck Hgb this afternoon. If stable and no evidence of bleeding, OK to discharge.  -Repeat EGD in 8 weeks to check healing.     Approximately 20 minutes of total time was spent providing patient care including patient evaluation, reviewing documentation/test results, and .                                                  Nils Jaeger MD  Thank you for the opportunity to participate in the care of this patient.   Please feel free to call me with any questions or concerns.  Phone number  [phone].

## 2022-06-30 NOTE — UTILIZATION REVIEW
Admission Status; Secondary Review Determination   Under the authority of the Utilization Management Committee, the utilization review process indicated a secondary review on Rachel Schneider. The review outcome is based on review of the medical records, discussions with staff, and applying clinical experience noted on the date of the review.   (x) Inpatient Status Appropriate - This patient's medical care is consistent with medical management for inpatient care and reasonable inpatient medical practice.     RATIONALE FOR DETERMINATION   56 yr old female with anxiety/depression, chronic back pain, HTN presented to ED on 6/28/22 after fall/syncope related to suspected GI bleed with melana.  Duodenal ulcer identified on EGD and injected and treated with cautery.  Serial hgb trending with 12.6 down to 9.3.  Noted C3 transverse process fracture and unable to take home NSAIDS with GI bleed/ulcer.  Pain team consulted.  Regular alcohol use at home thus not candidate for narcotics at discharge.  Discussed with Dr. Francois and working on pain management and not discharging today and crossing 3rd night.    At the time of admission with the information available to the attending physician more than 2 nights Hospital complex care was anticipated, based on patient risk of adverse outcome if treated as outpatient and complex care required. Inpatient admission is appropriate based on the Medicare guidelines.   The information on this document is developed by the utilization review team in order for the business office to ensure compliance. This only denotes the appropriateness of proper admission status and does not reflect the quality of care rendered.   The definitions of Inpatient Status and Observation Status used in making the determination above are those provided in the CMS Coverage Manual, Chapter 1 and Chapter 6, section 70.4.   Sincerely,   Kaylee Thomas MD  Utilization Review  Physician Advisor  [facility]

## 2022-06-30 NOTE — PLAN OF CARE
PRIMARY DIAGNOSIS: ACUTE PAIN  OUTPATIENT/OBSERVATION GOALS TO BE MET BEFORE DISCHARGE:  1. Pain Status: Improved-controlled with oral pain medications.    2. Return to near baseline physical activity: Yes    3. Cleared for discharge by consultants (if involved): No, monitoring hemoglobin and LFTs.     Discharge Planner Nurse   Safe discharge environment identified: Yes  Barriers to discharge: No       Entered by: Francia Elena RN 06/30/2022 4:59 AM     Please review provider order for any additional goals.   Nurse to notify provider when observation goals have been met and patient is ready for discharge.Goal Outcome Evaluation:

## 2022-06-30 NOTE — PROGRESS NOTES
PRIMARY DIAGNOSIS: ACUTE PAIN  OUTPATIENT/OBSERVATION GOALS TO BE MET BEFORE DISCHARGE:  1. Pain Status: Improved-controlled with oral pain medications.    2. Return to near baseline physical activity: No    3. Cleared for discharge by consultants (if involved): No    Discharge Planner Nurse   Safe discharge environment identified: No  Barriers to discharge: Yes     Pt alert and oriented times 4. Vitals stable. Pt c/o headache and requested to pain medication. Gave oxycodone with some relief. Pt did not have any bowel movement this shift. Last hemoglobin at 1856 was 10.3. Next hgb due in am.             Entered by: Ariadna Black RN 06/29/2022 10:20 PM

## 2022-07-01 ENCOUNTER — APPOINTMENT (OUTPATIENT)
Dept: PHYSICAL THERAPY | Facility: HOSPITAL | Age: 57
DRG: 377 | End: 2022-07-01
Payer: COMMERCIAL

## 2022-07-01 VITALS
OXYGEN SATURATION: 99 % | TEMPERATURE: 97.7 F | SYSTOLIC BLOOD PRESSURE: 143 MMHG | HEIGHT: 63 IN | DIASTOLIC BLOOD PRESSURE: 67 MMHG | RESPIRATION RATE: 18 BRPM | HEART RATE: 72 BPM | WEIGHT: 140.87 LBS | BODY MASS INDEX: 24.96 KG/M2

## 2022-07-01 PROBLEM — F10.20 ALCOHOL USE DISORDER, SEVERE, DEPENDENCE (H): Status: ACTIVE | Noted: 2022-07-01

## 2022-07-01 LAB
ALBUMIN SERPL-MCNC: 3.1 G/DL (ref 3.5–5)
ALP SERPL-CCNC: 214 U/L (ref 45–120)
ALT SERPL W P-5'-P-CCNC: 59 U/L (ref 0–45)
AST SERPL W P-5'-P-CCNC: 36 U/L (ref 0–40)
BILIRUB DIRECT SERPL-MCNC: 0.2 MG/DL
BILIRUB SERPL-MCNC: 0.4 MG/DL (ref 0–1)
HGB BLD-MCNC: 9.4 G/DL (ref 11.7–15.7)
PROT SERPL-MCNC: 5.7 G/DL (ref 6–8)

## 2022-07-01 PROCEDURE — 250N000011 HC RX IP 250 OP 636: Performed by: INTERNAL MEDICINE

## 2022-07-01 PROCEDURE — 99239 HOSP IP/OBS DSCHRG MGMT >30: CPT | Performed by: INTERNAL MEDICINE

## 2022-07-01 PROCEDURE — 85018 HEMOGLOBIN: CPT | Performed by: INTERNAL MEDICINE

## 2022-07-01 PROCEDURE — 80076 HEPATIC FUNCTION PANEL: CPT | Performed by: INTERNAL MEDICINE

## 2022-07-01 PROCEDURE — C9113 INJ PANTOPRAZOLE SODIUM, VIA: HCPCS | Performed by: INTERNAL MEDICINE

## 2022-07-01 PROCEDURE — 250N000013 HC RX MED GY IP 250 OP 250 PS 637: Performed by: PAIN MEDICINE

## 2022-07-01 PROCEDURE — 36415 COLL VENOUS BLD VENIPUNCTURE: CPT | Performed by: INTERNAL MEDICINE

## 2022-07-01 PROCEDURE — 99232 SBSQ HOSP IP/OBS MODERATE 35: CPT | Performed by: PAIN MEDICINE

## 2022-07-01 PROCEDURE — 250N000013 HC RX MED GY IP 250 OP 250 PS 637: Performed by: FAMILY MEDICINE

## 2022-07-01 PROCEDURE — 99221 1ST HOSP IP/OBS SF/LOW 40: CPT | Mod: 95 | Performed by: FAMILY MEDICINE

## 2022-07-01 PROCEDURE — 250N000013 HC RX MED GY IP 250 OP 250 PS 637: Performed by: INTERNAL MEDICINE

## 2022-07-01 RX ORDER — PANTOPRAZOLE SODIUM 40 MG/1
40 TABLET, DELAYED RELEASE ORAL DAILY
Qty: 30 TABLET | Refills: 0 | Status: SHIPPED | OUTPATIENT
Start: 2022-07-01 | End: 2022-07-01

## 2022-07-01 RX ORDER — LANOLIN ALCOHOL/MO/W.PET/CERES
100 CREAM (GRAM) TOPICAL DAILY
Qty: 30 TABLET | Refills: 0 | Status: SHIPPED | OUTPATIENT
Start: 2022-07-02

## 2022-07-01 RX ORDER — GABAPENTIN 300 MG/1
900 CAPSULE ORAL EVERY 8 HOURS
Qty: 270 CAPSULE | Refills: 0 | Status: SHIPPED | OUTPATIENT
Start: 2022-07-01 | End: 2022-07-15

## 2022-07-01 RX ORDER — FOLIC ACID 1 MG/1
1 TABLET ORAL DAILY
Qty: 30 TABLET | Refills: 0 | Status: SHIPPED | OUTPATIENT
Start: 2022-07-02

## 2022-07-01 RX ORDER — PANTOPRAZOLE SODIUM 40 MG/1
40 TABLET, DELAYED RELEASE ORAL DAILY
Qty: 30 TABLET | Refills: 0 | Status: SHIPPED | OUTPATIENT
Start: 2022-07-01

## 2022-07-01 RX ORDER — FOLIC ACID 1 MG/1
1 TABLET ORAL DAILY
Qty: 30 TABLET | Refills: 0 | Status: SHIPPED | OUTPATIENT
Start: 2022-07-02 | End: 2022-07-01

## 2022-07-01 RX ORDER — GABAPENTIN 300 MG/1
900 CAPSULE ORAL EVERY 8 HOURS
Status: DISCONTINUED | OUTPATIENT
Start: 2022-07-01 | End: 2022-07-01 | Stop reason: HOSPADM

## 2022-07-01 RX ORDER — NICOTINE 21 MG/24HR
1 PATCH, TRANSDERMAL 24 HOURS TRANSDERMAL EVERY 24 HOURS
Qty: 30 PATCH | Refills: 1 | Status: SHIPPED | OUTPATIENT
Start: 2022-07-01 | End: 2022-07-14

## 2022-07-01 RX ADMIN — CLONIDINE HYDROCHLORIDE 0.1 MG: 0.1 TABLET ORAL at 14:41

## 2022-07-01 RX ADMIN — FLUTICASONE PROPIONATE 2 SPRAY: 50 SPRAY, METERED NASAL at 10:53

## 2022-07-01 RX ADMIN — CLONIDINE HYDROCHLORIDE 0.1 MG: 0.1 TABLET ORAL at 06:32

## 2022-07-01 RX ADMIN — LIDOCAINE 3 PATCH: 246 PATCH TOPICAL at 14:39

## 2022-07-01 RX ADMIN — POLYETHYLENE GLYCOL 3350 17 G: 17 POWDER, FOR SOLUTION ORAL at 09:12

## 2022-07-01 RX ADMIN — GABAPENTIN 900 MG: 300 CAPSULE ORAL at 14:41

## 2022-07-01 RX ADMIN — THIAMINE HCL TAB 100 MG 100 MG: 100 TAB at 09:12

## 2022-07-01 RX ADMIN — PANTOPRAZOLE SODIUM 40 MG: 40 INJECTION, POWDER, FOR SOLUTION INTRAVENOUS at 06:28

## 2022-07-01 RX ADMIN — FOLIC ACID 1 MG: 1 TABLET ORAL at 09:12

## 2022-07-01 RX ADMIN — GABAPENTIN 900 MG: 300 CAPSULE ORAL at 06:32

## 2022-07-01 RX ADMIN — Medication 1 TABLET: at 09:12

## 2022-07-01 ASSESSMENT — ACTIVITIES OF DAILY LIVING (ADL)
ADLS_ACUITY_SCORE: 19
ADLS_ACUITY_SCORE: 25
ADLS_ACUITY_SCORE: 25
ADLS_ACUITY_SCORE: 19
ADLS_ACUITY_SCORE: 19
ADLS_ACUITY_SCORE: 25
ADLS_ACUITY_SCORE: 19
ADLS_ACUITY_SCORE: 19

## 2022-07-01 NOTE — PROGRESS NOTES
ADDICTION MEDICINE BRIEF NOTE    Consult received.  Patient will be evaluated later this morning.  I placed an order for a rule 25 and will consider naltrexone to be continued at discharge and follow-up in the outpatient addiction medicine clinic.    Yadira Talamantes MD  Addiction Medicine

## 2022-07-01 NOTE — CONSULTS
Care Management Discharge Note    Discharge Date: 07/01/2022       Discharge Disposition:  Home    Discharge Services:  Information on Rule 25, and CD resources on discharge paperwork    Discharge DME:      Discharge Transportation: friend    Private pay costs discussed: Not applicable    Education Provided on the Discharge Plan:  yes  Persons Notified of Discharge Plans:Pt,   Patient/Family in Agreement with the Plan:  yes    Handoff Referral Completed: Yes    Additional Information:  SW unable to meet with pt, she requested SW leave as she was on the IPAD talking with others. SW will re attempt prior to discharge.  SW received signed ELISE to talk to KOTURA from pt.   SW spoke to Laury at [employer], appointment was made for pt and Laury emailed writer documents for pt to sign.   SW met with pt and she had her daughter Angella on the phone, pt agreeable to discuss all for daugther to hear. Sobriety was discussed along with the Rule 25 assessment on July 5 at 11 AM.  Pt admitted she has been drinking and wants to quit drinking and remain sober, she requested information on local AA meetings. We also discussed online therapy to cope with the grief of all her losses. Pt and her daughter would like some therapist names.   Pt signed forms.  MICHAEL secure emailed forms to [email or web address], and printed out the number for AA along with meetings in her area. SW provided her with a list of therapist who addressed grief and addiction providing online support.   Pt grateful for resources. Friend to transport pt home.        SOFIA Corral

## 2022-07-01 NOTE — PROGRESS NOTES
[facility] Progress Note    Subjective:  Pt reports feeling well. Denies abdominal pain, nausea, emesis, melena, hematochezia. Tolerating diet.  Admits to heavy EtOH use.    Objective:  Vital signs in last 24 hours  Temp:  [97.7  F (36.5  C)-98.6  F (37  C)] 97.7  F (36.5  C)  Pulse:  [64-76] 69  Resp:  [16-18] 18  BP: (140-150)/(66-67) 144/67  SpO2:  [97 %-100 %] 99 % O2 Device: None (Room air)      Gen: A&Ox3, NAD  Eyes: No icterus  Gastrointestinal: Soft, NTTP, BS+, no rebound or guarding        LABORATORY    ELECTROLYTE PANEL   Recent Labs   Lab 06/29/22  0655 06/28/22  1333   *  135* 132*   POTASSIUM 4.3  4.3 5.2*   CHLORIDE 106  106 96*   CO2 21*  21* 25   GLC 74  74 97   CR 0.73  0.73 1.13*   BUN 25*  25* 38*      HEMATOLOGY PANEL   Recent Labs   Lab 07/01/22  0416 06/30/22  1449 06/30/22  0625 06/29/22  1856 06/29/22  0654 06/28/22  1938 06/28/22  1333   HGB 9.4* 10.2* 9.4*   < > 10.1*  10.1*   < > 12.5   MCV  --   --   --   --  119*  --  114*   WBC  --   --   --   --  4.1  --  6.2   PLT  --   --   --   --  142*  --  202   INR  --   --   --   --   --   --  0.93    < > = values in this interval not displayed.      LIVER AND PANCREAS PANEL   Recent Labs   Lab 07/01/22  0937 06/30/22  0625 06/29/22  0655   AST 36 79* 205*   ALT 59* 79* 99*   ALKPHOS 214* 221* 229*   BILITOTAL 0.4 0.5 0.4     IMAGING STUDIES    XR Cervical Spine 2/3 Views    Result Date: 6/29/2022  EXAM: XR CERVICAL SPINE 2/3 VWS LOCATION: [facility] DATE/TIME: 6/29/2022 3:16 PM INDICATION: Follow-up right C3 transverse process fracture COMPARISON: CT cervical spine 06/20/2022 TECHNIQUE: CR Cervical Spine.     IMPRESSION: The known fracture of the right C3 transverse process is not well redemonstrated radiographically. Stable normal vertebral body heights with unchanged alignment. No interval acute fracture or traumatic subluxation. Redemonstration of CDS at C4-C7, without hardware complication.  Mild interbody degenerative change at C3-C4 and mild to moderate multilevel degenerative facet arthropathy. Normal appearance of the anterior C1-C2 articulation. No prevertebral edema. Visualized lung apices are unremarkable.     MR CERVICAL SPINE W/O CONTRAST    Result Date: 6/28/2022  EXAM: MR CERVICAL SPINE W/O CONTRAST LOCATION: [facility] DATE/TIME: 6/28/2022 7:54 PM INDICATION: Neck pain; Trauma; Mild moderate trauma; Cervical CT result available; r o Fracture; No known automatically detected potential contraindications to MRI COMPARISON: CT cervical spine 06/28/2022 TECHNIQUE: MRI Cervical Spine without IV contrast. FINDINGS: Alignment: Normal. Anterior discectomy and solid interbody fusion changes with anterior hardware instrumentation from C4 through C7. Susceptibility artifact related to the hardware limits hardware assessment. Normal vertebral body heights. The known C3 transverse process fracture is visualized and mild regional edema. It is better seen on the CT performed earlier in the day. Marrow signal: Normal. No extraspinal abnormality. Craniovertebral junction: Normal. C1-2: Normal. C2-3: Slight loss of disc height. Slight focal midline disc osteophyte. Left uncovertebral hypertrophy. Moderate left facet hypertrophy. No central spinal stenosis. Right foramen: no stenosis. Left foramen: moderate stenosis. C3-4: Slight loss of disc height. Broad based disc osteophyte complex. No uncovertebral hypertrophy. Mild bilateral facet hypertrophy. Mild central spinal stenosis. Right foramen: moderate stenosis. Left foramen: moderate stenosis. C4-5:  Status post ACDF. Broad based disc osteophyte complex. No uncovertebral hypertrophy. Mild right facet hypertrophy. No central spinal stenosis. Right foramen: no stenosis. Left foramen: no stenosis. C5-6: Status post ACDF. Broad based disc osteophyte complex. No uncovertebral hypertrophy. Mild right facet hypertrophy. No central spinal  stenosis. Right foramen: mild to moderate stenosis. Left foramen: mild to moderate stenosis. C6-7: Status post ACDF. Broad based disc osteophyte complex. Bilateral uncovertebral hypertrophy. Mild right facet hypertrophy. Mild central spinal stenosis. Right foramen: moderate stenosis. Left foramen: moderate stenosis. C7-T1: Normal height of disc. No disc herniation. No uncovertebral hypertrophy. Normal facet joints. No central spinal stenosis. Right foramen: no stenosis. Left foramen: no stenosis.     IMPRESSION: 1.  Right C3 transverse process fracture is visualized with mild regional edema. The fracture is better seen on CT earlier today. No additional fractures are visualized. 2.  No evidence of any posterior tension band injury. 3.  Postsurgical changes as above with mild spinal stenosis at C3-C4, C6-C7.    CTA Neck with Contrast    Result Date: 6/28/2022  EXAM: CTA NECK WITH CONTRAST LOCATION: [facility] DATE/TIME: 6/28/2022 3:06 PM INDICATION: History of C3 transverse process fracture, CT performed to evaluate for vessel injury. COMPARISON: CT cervical spine 6/28/2022 CONTRAST: IsoVue 370 75mL TECHNIQUE: Neck CT angiogram with IV contrast. Axial helical CT images of the neck vessels were obtained during the arterial phase of intravenous contrast administration. Axial helical 2D reconstructed images and multiplanar 3D MIP reconstructed images of the neck vessels were performed by the technologist. Dose reduction techniques were used. All stenosis measurements made according to NASCET criteria unless otherwise specified. FINDINGS: RIGHT CAROTID: No measurable stenosis or dissection. LEFT CAROTID: Mild 30% stenosis secondary to coarsely calcified atherosclerotic plaque. No dissection. VERTEBRAL ARTERIES: No evidence of dissection. Right-side dominant vertebral arterial system. 50% stenosis right vertebral artery V1 segment secondary to atherosclerotic plaque. AORTIC ARCH: Classic aortic  arch anatomy with no significant stenosis at the origin of the great vessels. NONVASCULAR STRUCTURES: Mild centrilobular emphysematous changes. Cervical spinal fusion, right C3 transverse process fracture and spondylosis as described on the comparison examination. Probable 2 mm nodule left thyroid lobe, partially obscured by hardware artifact.     IMPRESSION: No evidence of vertebral artery injury associated with the nondisplaced C3 right transverse process fracture. 50% stenosis right vertebral artery V1 segment secondary to atherosclerotic plaque. Mild left carotid stenosis. Probable 2 mm nodule left thyroid lobe, partially obscured by hardware artifact. This is of doubtful clinical significance in a patient of this age.    Cervical spine CT w/o contrast    Result Date: 6/28/2022  EXAM: CT HEAD W/O CONTRAST, CT CERVICAL SPINE W/O CONTRAST LOCATION: [facility] DATE/TIME: 6/28/2022 1:50 PM INDICATION: Multiple recent falls. Left-sided neck pain. COMPARISON: None. TECHNIQUE: 1) Routine CT Head without IV contrast. Multiplanar reformats. Dose reduction techniques were used. 2) Routine CT Cervical Spine without IV contrast. Multiplanar reformats. Dose reduction techniques were used. FINDINGS: HEAD CT: INTRACRANIAL CONTENTS: No intracranial hemorrhage, extraaxial collection, or mass effect.  No CT evidence of acute infarct. Mild presumed chronic small vessel ischemic changes. Minor cerebral volume loss. No hydrocephalus. VISUALIZED ORBITS/SINUSES/MASTOIDS: No intraorbital abnormality. No paranasal sinus mucosal disease. No middle ear or mastoid effusion. BONES/SOFT TISSUES: No scalp hematoma. No skull fracture. CERVICAL SPINE CT: VERTEBRA: Straightened cervical lordosis. 2 mm degenerative type anterolisthesis at C2-C3 and C3-C4. Preserved vertebral body heights. Anterior discectomy and solid interbody fusion changes with anterior hardware instrumentation from C4 through C7. No evidence for  hardware complication. Nondisplaced right transverse process fracture involving the right transverse foramen at C3. No additional fracture or posttraumatic subluxation identified. CANAL/FORAMINA: Mild multilevel cervical spondylosis. No CT evidence for high-grade central spinal canal stenosis. Moderate to severe right neural foraminal stenosis at C3-C4. Mild to moderate bilateral neural foraminal stenosis at C5-C6 and C6-C7, right  greater than left. PARASPINAL: Prevertebral soft tissues within normal limits for thickness. Atherosclerotic calcifications of the carotid bifurcations. Visualized lung fields are clear.     IMPRESSION: HEAD CT: 1.  No CT evidence for acute intracranial process. 2.  Brain atrophy and presumed chronic microvascular ischemic changes as above. CERVICAL SPINE CT: 1.  Nondisplaced fracture of the right transverse process at C3. This involves the right transverse foramen. CTA of the neck is advised to exclude associated vascular injury at this level. 2.  No additional fracture or posttraumatic subluxation. 3.  Multilevel cervical fusion without hardware complication. 4.  Multilevel cervical spondylosis as above. [Critical Result: Acute cervical spine fracture] Finding was identified on 6/28/2022 2:11 PM. JOSUÉ Frank was contacted by me on 6/28/2022 2:25 PM and verbalized understanding of the critical result.     Head CT w/o contrast    Result Date: 6/28/2022  EXAM: CT HEAD W/O CONTRAST, CT CERVICAL SPINE W/O CONTRAST LOCATION: [facility] DATE/TIME: 6/28/2022 1:50 PM INDICATION: Multiple recent falls. Left-sided neck pain. COMPARISON: None. TECHNIQUE: 1) Routine CT Head without IV contrast. Multiplanar reformats. Dose reduction techniques were used. 2) Routine CT Cervical Spine without IV contrast. Multiplanar reformats. Dose reduction techniques were used. FINDINGS: HEAD CT: INTRACRANIAL CONTENTS: No intracranial hemorrhage, extraaxial collection, or mass effect.  No CT  evidence of acute infarct. Mild presumed chronic small vessel ischemic changes. Minor cerebral volume loss. No hydrocephalus. VISUALIZED ORBITS/SINUSES/MASTOIDS: No intraorbital abnormality. No paranasal sinus mucosal disease. No middle ear or mastoid effusion. BONES/SOFT TISSUES: No scalp hematoma. No skull fracture. CERVICAL SPINE CT: VERTEBRA: Straightened cervical lordosis. 2 mm degenerative type anterolisthesis at C2-C3 and C3-C4. Preserved vertebral body heights. Anterior discectomy and solid interbody fusion changes with anterior hardware instrumentation from C4 through C7. No evidence for hardware complication. Nondisplaced right transverse process fracture involving the right transverse foramen at C3. No additional fracture or posttraumatic subluxation identified. CANAL/FORAMINA: Mild multilevel cervical spondylosis. No CT evidence for high-grade central spinal canal stenosis. Moderate to severe right neural foraminal stenosis at C3-C4. Mild to moderate bilateral neural foraminal stenosis at C5-C6 and C6-C7, right  greater than left. PARASPINAL: Prevertebral soft tissues within normal limits for thickness. Atherosclerotic calcifications of the carotid bifurcations. Visualized lung fields are clear.     IMPRESSION: HEAD CT: 1.  No CT evidence for acute intracranial process. 2.  Brain atrophy and presumed chronic microvascular ischemic changes as above. CERVICAL SPINE CT: 1.  Nondisplaced fracture of the right transverse process at C3. This involves the right transverse foramen. CTA of the neck is advised to exclude associated vascular injury at this level. 2.  No additional fracture or posttraumatic subluxation. 3.  Multilevel cervical fusion without hardware complication. 4.  Multilevel cervical spondylosis as above. [Critical Result: Acute cervical spine fracture] Finding was identified on 6/28/2022 2:11 PM. JOSUÉ Frank was contacted by me on 6/28/2022 2:25 PM and verbalized understanding of the critical  result.       I have reviewed the current diagnostic and laboratory tests.              Acute hepatitis panel negative     Folate, B12 nml        EGD  - LA Grade B reflux esophagitis with no bleeding.   - Benign-appearing esophageal stricture. Biopsied.   - Gastritis. Biopsied.   - Non-obstructing non-bleeding duodenal ulcer with pigmented material. Injected. Treated with bipolar cautery.   - Normal second portion of the duodenum.   Path   A) BIOPSIES OF STOMACH:        -  MILD CHRONIC GASTRITIS, DIFFUSE PATTERN, MOST CONSISTENT WITH                REACTIVE GASTROPATHY        -  NEGATIVE FOR ACTIVE GASTRITIS        -  NO HEMORRHAGE, NECROSIS, OR ULCERATION IDENTIFIED IN BIOPSY        -  NEGATIVE FOR DYSPLASIA        -  NEGATIVE FOR HELICOBACTER ORGANISMS BY IMMUNOHISTOCHEMISTRY    B) BIOPSY OF ESOPHAGEAL STRICTURE:        -  HYPERTROPHIC SQUAMOUS EPITHELIUM WITHOUT ATYPIA OR DYSPLASIA        -  NEGATIVE FOR ACUTE OR CHRONIC INFLAMMATION        -  NEGATIVE FOR EOSINOPHILIA (0 EOSINOPHILS/HPF)        -  NO STROMA IN BIOPSY SAMPLE        Assessment:  1. Melena - Duodenal ulcer with stigmata of bleeding. Treated endoscopically. Stable. On PPI  2. Anemia - Acute. GIB contributing. Macrocytosis. Hgb stable.  3. GERD  4. Cervical fracture - No vascular injury on imaging.  5. Elevated liver enzymes - Hx hepatic steatosis. 2/2 EtOH.            Patient Active Problem List   Diagnosis     Enthesopathy of hip region     Backache     Cervicalgia     Melena     C3 cervical fracture (H)     ARJUN (acute kidney injury) (H)     Hyperkalemia     Hyponatremia     Abnormal LFTs     Asthma     Hepatic steatosis     Chronic low back pain     Depression     Fibromyalgia     HTN (hypertension)     Obesity         Plan:  -Antireflux regimen.   -No aspirin, ibuprofen, naproxen, or other non-steroidal anti-inflammatory drugs.   -Continue pantoprazole 40 mg IV BID as inpatient. Transition to 40 mg ONCE daily by mouth at discharge.   -Low fiber  diet.  -Importance of EtOH abstinence addressed.  -Repeat EGD in 8 weeks to check healing.  -OK to discharge from GI perspective.    Will sign off. Please call with questions.    Approximately 20 minutes of total time was spent providing patient care including patient evaluation, reviewing documentation/test results, and .                                                  Nils Jaeger MD  Thank you for the opportunity to participate in the care of this patient.   Please feel free to call me with any questions or concerns.  Phone number [phone].

## 2022-07-01 NOTE — DISCHARGE SUMMARY
[facility]  Hospitalist Discharge Summary      Date of Admission:  6/28/2022  Date of Discharge:  7/1/2022  Discharging Provider: Gerry Francois MD  Discharge Service: Hospitalist Service    Discharge Diagnoses   C3 transverse process fracture   Acute Blood Loss Anemia   GI bleed.    Large grade B reflux esophagitis  Duodenal ulcer   Syncope   Hyperkalemia   Acute kidney injury   Hepatic steatosis  Transaminitis     Follow-ups Needed After Discharge   Follow-up Appointments     Follow-up and recommended labs and tests       [facility]   follow up in 2 weeks with repeat cervical xray at that time   Our office will call you in 2-3 business days after discharge to schedule   your follow-up appointments. If you have not received a call in 2-3   business days, please call our office at [phone].    Your follow up appointment may be with the surgeon or the nurse   practitioner.         Follow-up and recommended labs and tests       Follow up with primary care provider, Kika Leblanc, within 7 days for   hospital follow- up.  Monitor LFT.  Follow-up on the neurosurgery clinic   as arranged.         Consider restarting statin if LFTs continue to improve.    Discharge Disposition   Discharged to home  Condition at discharge: Stable      Hospital Course   Rachel CruzSivanNeal is a 56-year-old female with history of anxiety/depression, mood disorder, chronic low back pain, fibromyalgia, hypertension, hepatic steatosis, asthma admitted on 6/29/2022 due to syncope possibly due to acute blood loss anemia secondary to GI bleed and C3 transverse process fracture.       Patient endorsed history of melena. EGD 6/29/2022 revealed large grade B reflux esophagitis with benign-appearing esophageal stricture, gastritis as well as nonobstructing nonbleeding duodenal ulcer with pigmented material.  Pathology report is pending.  Gastroenterologist recommended pantoprazole 40 mg IV twice daily  with plan to transition to 40 mg daily at discharge and repeat EGD in 8 weeks to check for healing, full liquid diet, and avoidance of aspirin or NSAIDs.  Patient had deranged LFTs during hospital stay possibly secondary to medication induced hepatitis in the setting of hepatic steatosis versus alcoholic liver disease.  Liver function test improved with discontinuation of Tylenol and simvastatin.  Simvastatin was not restarted at discharge.    Neurosurgery service recommended conservative management of C3 transverse process fracture with neck collar. Addiction medicine service has arranged for rule 25 and she will follow up as outpatient in the addiction medicine clinic.     ARJUN resolved with IV fluid and discontinuation of lisinopril. Symptoms have improved and patient is eager to go home. She is clinically stable for discharge at this time.     Consultations This Hospital Stay   ORTHOSIS BRACE IP CONSULT  GASTROENTEROLOGY IP CONSULT  PHYSICAL THERAPY ADULT IP CONSULT  OCCUPATIONAL THERAPY ADULT IP CONSULT  NEUROSURGERY IP CONSULT  CARE MANAGEMENT / SOCIAL WORK IP CONSULT  PAIN MANAGEMENT ADULT IP CONSULT  ADDICTION MEDICINE INPATIENT CONSULT  SOCIAL WORK IP CONSULT  SOCIAL WORK IP CONSULT  ADDICTION MEDICINE INPATIENT CONSULT    Code Status   Full Code    Time Spent on this Encounter   I, Gerry Francois MD, personally saw the patient today and spent greater than 30 minutes discharging this patient.       Gerry Francois MD  [address] [address]  Phone: [phone]  Fax: [phone]  ______________________________________________________________________    Physical Exam   Vital Signs: Temp: 97.7  F (36.5  C) Temp src: Oral BP: (!) 144/67 Pulse: 69   Resp: 18 SpO2: 99 % O2 Device: None (Room air)    Weight: 140 lbs 13.98 oz    General appearance: Awake, Alert, Cooperative, not in any obvious distress and appears stated age   HEENT:  Normocephalic, atraumatic, conjunctiva clear without icterus and ears without discharge  Neck: Neck collar in place  Lungs: Clear to auscultation bilaterally, no wheezing, good air exchange, normal work of breathing  Cardiovascular: Regular Rate and Rythm, normal apical impulse, normal S1 and S2, no lower extremity edema bilaterally  Abdomen: Soft, non-tender and Non-distended, active bowel sounds  Skin: Skin color, texture normal and bruising or bleeding. No rashes or lesions over face, neck, arms and legs, turgor normal.  Musculoskeletal: No bony deformities or joint tenderness. Normal ROM upon flexion & extension.   Neurologic: Alert & Oriented X 3, Facial symmetry preserved and upper & lower extremities moving well with symmetry  Psychiatric: Calm, normal eye contact and normal affect       Primary Care Physician   Kika Leblanc    Discharge Orders      Follow-up and recommended labs and tests     [facility] Neurosurgery   follow up in 2 weeks with repeat cervical xray at that time   Our office will call you in 2-3 business days after discharge to schedule your follow-up appointments. If you have not received a call in 2-3 business days, please call our office at [phone].    Your follow up appointment may be with the surgeon or the nurse practitioner.     Activity    *No lifting, pushing or pulling greater than 5-10 pound (this is about a gallon of milk).  *No repetitive bending, twisting, or jarring activities  *No overhead work  *No aerobic or strenuous activity  *No activities with increased risk of falls  *You may move about your home as tolerated  *You may walk up and down stairs as tolerated      WALKING PROGRAM: As you can tolerate, walk daily-start with 5-10 minutes of continuous walking. This is in addition to the walking that you do as part of your daily activities. Increase the time that you walk by 5 minutes every couple of days. Do not exceed 30-45 minutes of continuous walking until seen in  follow-up.   **Listen to your body, if you find that you are more painful or fatigued, you may need to proceed more slowly.    **Do not smoke or expose yourself to second hand smoke. Cigarette smoke can delay healing and cause complications.     DRIVING:    We recommend that you do not drive while wearing a brace, as it could limit your range of motion.     Reason for your hospital stay    C3 transverse process fracture   GI bleed.    Large grade B reflux esophagitis  Duodenal ulcer   Syncope   Hyperkalemia   Acute kidney injury   Hepatic steatosis  Transaminitis     Follow-up and recommended labs and tests     Follow up with primary care provider, Kika Leblanc, within 7 days for hospital follow- up.  Monitor LFT.  Follow-up on the neurosurgery clinic as arranged.     Activity    Your activity upon discharge: activity as tolerated     Diet    Follow this diet upon discharge: Orders Placed This Encounter      Low Fiber Diet       Significant Results and Procedures   Results for orders placed or performed during the hospital encounter of 06/28/22   Cervical spine CT w/o contrast     Value    Radiologist flags Acute cervical spine fracture (AA)    Narrative    EXAM: CT HEAD W/O CONTRAST, CT CERVICAL SPINE W/O CONTRAST  LOCATION: [facility]  DATE/TIME: 6/28/2022 1:50 PM    INDICATION: Multiple recent falls. Left-sided neck pain.  COMPARISON: None.  TECHNIQUE:   1) Routine CT Head without IV contrast. Multiplanar reformats. Dose reduction techniques were used.  2) Routine CT Cervical Spine without IV contrast. Multiplanar reformats. Dose reduction techniques were used.    FINDINGS:   HEAD CT:   INTRACRANIAL CONTENTS: No intracranial hemorrhage, extraaxial collection, or mass effect.  No CT evidence of acute infarct. Mild presumed chronic small vessel ischemic changes. Minor cerebral volume loss. No hydrocephalus.     VISUALIZED ORBITS/SINUSES/MASTOIDS: No intraorbital abnormality. No paranasal  sinus mucosal disease. No middle ear or mastoid effusion.    BONES/SOFT TISSUES: No scalp hematoma. No skull fracture.    CERVICAL SPINE CT:   VERTEBRA: Straightened cervical lordosis. 2 mm degenerative type anterolisthesis at C2-C3 and C3-C4. Preserved vertebral body heights. Anterior discectomy and solid interbody fusion changes with anterior hardware instrumentation from C4 through C7. No   evidence for hardware complication. Nondisplaced right transverse process fracture involving the right transverse foramen at C3. No additional fracture or posttraumatic subluxation identified.     CANAL/FORAMINA: Mild multilevel cervical spondylosis. No CT evidence for high-grade central spinal canal stenosis. Moderate to severe right neural foraminal stenosis at C3-C4. Mild to moderate bilateral neural foraminal stenosis at C5-C6 and C6-C7, right   greater than left.    PARASPINAL: Prevertebral soft tissues within normal limits for thickness. Atherosclerotic calcifications of the carotid bifurcations. Visualized lung fields are clear.      Impression    IMPRESSION:  HEAD CT:  1.  No CT evidence for acute intracranial process.  2.  Brain atrophy and presumed chronic microvascular ischemic changes as above.    CERVICAL SPINE CT:  1.  Nondisplaced fracture of the right transverse process at C3. This involves the right transverse foramen. CTA of the neck is advised to exclude associated vascular injury at this level.  2.  No additional fracture or posttraumatic subluxation.  3.  Multilevel cervical fusion without hardware complication.  4.  Multilevel cervical spondylosis as above.    [Critical Result: Acute cervical spine fracture]    Finding was identified on 6/28/2022 2:11 PM.     JOSUÉ Frank was contacted by me on 6/28/2022 2:25 PM and verbalized understanding of the critical result.    Head CT w/o contrast     Value    Radiologist flags Acute cervical spine fracture (AA)    Narrative    EXAM: CT HEAD W/O CONTRAST, CT CERVICAL  SPINE W/O CONTRAST  LOCATION: [facility]  DATE/TIME: 6/28/2022 1:50 PM    INDICATION: Multiple recent falls. Left-sided neck pain.  COMPARISON: None.  TECHNIQUE:   1) Routine CT Head without IV contrast. Multiplanar reformats. Dose reduction techniques were used.  2) Routine CT Cervical Spine without IV contrast. Multiplanar reformats. Dose reduction techniques were used.    FINDINGS:   HEAD CT:   INTRACRANIAL CONTENTS: No intracranial hemorrhage, extraaxial collection, or mass effect.  No CT evidence of acute infarct. Mild presumed chronic small vessel ischemic changes. Minor cerebral volume loss. No hydrocephalus.     VISUALIZED ORBITS/SINUSES/MASTOIDS: No intraorbital abnormality. No paranasal sinus mucosal disease. No middle ear or mastoid effusion.    BONES/SOFT TISSUES: No scalp hematoma. No skull fracture.    CERVICAL SPINE CT:   VERTEBRA: Straightened cervical lordosis. 2 mm degenerative type anterolisthesis at C2-C3 and C3-C4. Preserved vertebral body heights. Anterior discectomy and solid interbody fusion changes with anterior hardware instrumentation from C4 through C7. No   evidence for hardware complication. Nondisplaced right transverse process fracture involving the right transverse foramen at C3. No additional fracture or posttraumatic subluxation identified.     CANAL/FORAMINA: Mild multilevel cervical spondylosis. No CT evidence for high-grade central spinal canal stenosis. Moderate to severe right neural foraminal stenosis at C3-C4. Mild to moderate bilateral neural foraminal stenosis at C5-C6 and C6-C7, right   greater than left.    PARASPINAL: Prevertebral soft tissues within normal limits for thickness. Atherosclerotic calcifications of the carotid bifurcations. Visualized lung fields are clear.      Impression    IMPRESSION:  HEAD CT:  1.  No CT evidence for acute intracranial process.  2.  Brain atrophy and presumed chronic microvascular ischemic changes as  above.    CERVICAL SPINE CT:  1.  Nondisplaced fracture of the right transverse process at C3. This involves the right transverse foramen. CTA of the neck is advised to exclude associated vascular injury at this level.  2.  No additional fracture or posttraumatic subluxation.  3.  Multilevel cervical fusion without hardware complication.  4.  Multilevel cervical spondylosis as above.    [Critical Result: Acute cervical spine fracture]    Finding was identified on 6/28/2022 2:11 PM.     JOSUÉ Frank was contacted by me on 6/28/2022 2:25 PM and verbalized understanding of the critical result.    CTA Neck with Contrast    Narrative    EXAM: CTA NECK WITH CONTRAST  LOCATION: [facility]  DATE/TIME: 6/28/2022 3:06 PM    INDICATION: History of C3 transverse process fracture, CT performed to evaluate for vessel injury.  COMPARISON: CT cervical spine 6/28/2022  CONTRAST: IsoVue 370 75mL  TECHNIQUE: Neck CT angiogram with IV contrast. Axial helical CT images of the neck vessels were obtained during the arterial phase of intravenous contrast administration. Axial helical 2D reconstructed images and multiplanar 3D MIP reconstructed images   of the neck vessels were performed by the technologist. Dose reduction techniques were used. All stenosis measurements made according to NASCET criteria unless otherwise specified.    FINDINGS:  RIGHT CAROTID: No measurable stenosis or dissection.    LEFT CAROTID: Mild 30% stenosis secondary to coarsely calcified atherosclerotic plaque. No dissection.    VERTEBRAL ARTERIES: No evidence of dissection. Right-side dominant vertebral arterial system. 50% stenosis right vertebral artery V1 segment secondary to atherosclerotic plaque.    AORTIC ARCH: Classic aortic arch anatomy with no significant stenosis at the origin of the great vessels.    NONVASCULAR STRUCTURES: Mild centrilobular emphysematous changes. Cervical spinal fusion, right C3 transverse process fracture and  spondylosis as described on the comparison examination. Probable 2 mm nodule left thyroid lobe, partially obscured by   hardware artifact.      Impression    IMPRESSION:     No evidence of vertebral artery injury associated with the nondisplaced C3 right transverse process fracture.    50% stenosis right vertebral artery V1 segment secondary to atherosclerotic plaque.    Mild left carotid stenosis.    Probable 2 mm nodule left thyroid lobe, partially obscured by hardware artifact. This is of doubtful clinical significance in a patient of this age.   MR CERVICAL SPINE W/O CONTRAST    Narrative    EXAM: MR CERVICAL SPINE W/O CONTRAST  LOCATION: [facility]  DATE/TIME: 6/28/2022 7:54 PM    INDICATION: Neck pain; Trauma; Mild moderate trauma; Cervical CT result available; r o Fracture; No known automatically detected potential contraindications to MRI  COMPARISON: CT cervical spine 06/28/2022  TECHNIQUE: MRI Cervical Spine without IV contrast.    FINDINGS:   Alignment: Normal.  Anterior discectomy and solid interbody fusion changes with anterior hardware instrumentation from C4 through C7. Susceptibility artifact related to the hardware limits hardware assessment.  Normal vertebral body heights. The known C3 transverse process fracture is visualized and mild regional edema. It is better seen on the CT performed earlier in the day.  Marrow signal: Normal.  No extraspinal abnormality.     Craniovertebral junction: Normal.    C1-2: Normal.    C2-3: Slight loss of disc height. Slight focal midline disc osteophyte. Left uncovertebral hypertrophy. Moderate left facet hypertrophy. No central spinal stenosis. Right foramen: no stenosis. Left foramen: moderate stenosis.    C3-4: Slight loss of disc height. Broad based disc osteophyte complex. No uncovertebral hypertrophy. Mild bilateral facet hypertrophy. Mild central spinal stenosis. Right foramen: moderate stenosis. Left foramen: moderate  stenosis.    C4-5:  Status post ACDF. Broad based disc osteophyte complex. No uncovertebral hypertrophy. Mild right facet hypertrophy. No central spinal stenosis. Right foramen: no stenosis. Left foramen: no stenosis.    C5-6: Status post ACDF. Broad based disc osteophyte complex. No uncovertebral hypertrophy. Mild right facet hypertrophy. No central spinal stenosis. Right foramen: mild to moderate stenosis. Left foramen: mild to moderate stenosis.    C6-7: Status post ACDF. Broad based disc osteophyte complex. Bilateral uncovertebral hypertrophy. Mild right facet hypertrophy. Mild central spinal stenosis. Right foramen: moderate stenosis. Left foramen: moderate stenosis.    C7-T1: Normal height of disc. No disc herniation. No uncovertebral hypertrophy. Normal facet joints. No central spinal stenosis. Right foramen: no stenosis. Left foramen: no stenosis.      Impression    IMPRESSION:  1.  Right C3 transverse process fracture is visualized with mild regional edema. The fracture is better seen on CT earlier today. No additional fractures are visualized.  2.  No evidence of any posterior tension band injury.  3.  Postsurgical changes as above with mild spinal stenosis at C3-C4, C6-C7.   XR Cervical Spine 2/3 Views    Narrative    EXAM: XR CERVICAL SPINE 2/3 VWS  LOCATION: [facility]  DATE/TIME: 6/29/2022 3:16 PM    INDICATION: Follow-up right C3 transverse process fracture  COMPARISON: CT cervical spine 06/20/2022  TECHNIQUE: CR Cervical Spine.      Impression    IMPRESSION: The known fracture of the right C3 transverse process is not well redemonstrated radiographically. Stable normal vertebral body heights with unchanged alignment. No interval acute fracture or traumatic subluxation.    Redemonstration of CDS at C4-C7, without hardware complication. Mild interbody degenerative change at C3-C4 and mild to moderate multilevel degenerative facet arthropathy. Normal appearance of the anterior  C1-C2 articulation. No prevertebral edema.   Visualized lung apices are unremarkable.           Discharge Medications   Current Discharge Medication List      START taking these medications    Details   folic acid (FOLVITE) 1 MG tablet Take 1 tablet (1 mg) by mouth daily  Qty: 30 tablet, Refills: 0    Associated Diagnoses: Acute kidney injury (H); Abnormal LFTs; Closed nondisplaced fracture of third cervical vertebra with routine healing, unspecified fracture morphology, subsequent encounter      pantoprazole (PROTONIX) 40 MG EC tablet Take 1 tablet (40 mg) by mouth daily  Qty: 30 tablet, Refills: 0    Associated Diagnoses: Acute kidney injury (H); Abnormal LFTs; Closed nondisplaced fracture of third cervical vertebra with routine healing, unspecified fracture morphology, subsequent encounter      thiamine (B-1) 100 MG tablet Take 1 tablet (100 mg) by mouth daily  Qty: 30 tablet, Refills: 0    Associated Diagnoses: Acute kidney injury (H); Abnormal LFTs; Closed nondisplaced fracture of third cervical vertebra with routine healing, unspecified fracture morphology, subsequent encounter         CONTINUE these medications which have NOT CHANGED    Details   albuterol (PROAIR HFA/PROVENTIL HFA/VENTOLIN HFA) 108 (90 Base) MCG/ACT inhaler Inhale 2 puffs into the lungs every 4 hours as needed      DULoxetine (CYMBALTA) 60 MG capsule Take 120 mg by mouth At Bedtime      fluticasone (FLONASE) 50 MCG/ACT nasal spray Spray 2 sprays in nostril daily      loratadine (CLARITIN) 10 MG tablet Take 10 mg by mouth daily as needed for allergies      ondansetron (ZOFRAN ODT) 4 MG ODT tab Take 4 mg by mouth every 8 hours as needed for nausea         STOP taking these medications       celecoxib (CELEBREX) 200 MG capsule Comments:   Reason for Stopping:         lisinopril (ZESTRIL) 20 MG tablet Comments:   Reason for Stopping:             Allergies   Allergies   Allergen Reactions     Gabapentin Nausea and Vomiting

## 2022-07-01 NOTE — PROGRESS NOTES
"[facility] ACUTE PAIN SERVICE    ([facility], [facility], [facility])  Daily PAIN Progress Note    Assessment/Plan:  Rachel De Jesus is a 56 year old female who was admitted on 6/28/2022.  I was asked to see the patient for neck pain. Admitted for fall with new C3 fracture, suspected GI bleed with esophagitis. History of anxiety/depression, chronic low back pain, fibromyalgia, chronic hip pain, HTN, hepatic steatosis, asthma..  Pain is minimal not taking the oxycodone since yesterday at noon.    PLAN:   1) neck and shoulder pain secondary to C3 transverse fracture.  Doing well on lidocaine and cervical collar also provide some comfort.  Would not continue opioids outside of the hospital due to fall risk and ative etoh use. Recommend maximizing multimodal pain regimen.   2)Multimodal Medication Therapy  Topical:  Lidocaine patch 4%  Adjuvants: can add tylenol 500mg bid, do not resume celebrex due to gi bleeding   Antidepressants/anxiolytics: on CIWA   Opioids: Oxycodone PRN while here   3)Non-medication interventions- Ice and PT   4)Constipation Prophylaxis- PRn   5) Follow up / Discharge plan: rule of 25, CD plan. Continue lidocaine, follow up with spine. Will require ortho follow up for hip (planned).         Subjective:    Today I met with the patient.  She is watching TV and sitting up in bed.  She appears cheerful and is happy talking about her grandkids.  She is very thankful for her children, they, \" went to my house yesterday and cleaned it up.  They also got rid of all of the alcohol\".  She is also very thankful for her, \"possy\" of friends.    We talked about pain management and tylenol vs lidocaine.     Discussed with Dr. Dusty anne, she will assist with rule of 25 completion and outpatient referral. May be a candidate for naltrexone Later (not now while on opioids).          Melena   Patient Active Problem List   Diagnosis     Enthesopathy of hip region     Backache     " "Cervicalgia     Melena     C3 cervical fracture (H)     ARJUN (acute kidney injury) (H)     Hyperkalemia     Hyponatremia     Abnormal LFTs     Asthma     Hepatic steatosis     Chronic low back pain     Depression     Fibromyalgia     HTN (hypertension)     Obesity     Acute kidney injury (H)     Closed fracture of transverse process of cervical vertebra, initial encounter (H)        History   Drug Use Not on file         Tobacco Use      Smoking status: Former Smoker        Types: Cigarettes        Quit date: 2009        Years since quittin.7      Smokeless tobacco: None          [Held by provider] acetaminophen  975 mg Oral Q8H     cloNIDine  0.1 mg Oral Q8H     DULoxetine  120 mg Oral At Bedtime     fluticasone  2 spray Nasal Daily     folic acid  1 mg Oral Daily     [START ON 2022] gabapentin  100 mg Oral Q8H     [START ON 2022] gabapentin  300 mg Oral Q8H     [START ON 7/3/2022] gabapentin  600 mg Oral Q8H     gabapentin  900 mg Oral Q8H     lidocaine  3 patch Transdermal Q24H YULISA     lidocaine   Transdermal Q8H YULISA     multivitamin w/minerals  1 tablet Oral Daily     pantoprazole (PROTONIX) IV  40 mg Intravenous Q12H     polyethylene glycol  17 g Oral Daily     [Held by provider] simvastatin  20 mg Oral At Bedtime     thiamine  100 mg Oral Daily       Objective:  Vital signs in last 24 hours:  B/P: 144/67, T: 97.7, P: 69, R: 18   Blood pressure (!) 144/67, pulse 69, temperature 97.7  F (36.5  C), temperature source Oral, resp. rate 18, height 1.6 m (5' 3\"), weight 63.9 kg (140 lb 14 oz), SpO2 99 %, not currently breastfeeding.      Weight:   Wt Readings from Last 2 Encounters:   22 63.9 kg (140 lb 14 oz)           Intake/Output:    Intake/Output Summary (Last 24 hours) at 2022 1026  Last data filed at 2022 0600  Gross per 24 hour   Intake 240 ml   Output --   Net 240 ml        Review of Systems:   As per subjective, all others negative.    Physical Exam:     General Appearance:  " Alert, cooperative, no distress, appears stated age   Patient is sitting up   Head:  Normocephalic, without obvious abnormality, atraumatic   Eyes:  PERRL, conjunctiva/corneas clear, EOM's intact   ENT/Throat: Lips normal     Lymph/Neck: Cervical collar on    Lungs:    , respirations unlabored   Chest Wall:  No tenderness or deformity   Cardiovascular/Heart:  No sob    Abdomen:   Soft, non-tender,   Musculoskeletal: Extremities normal, atraumatic      Skin: Skin color pale    Neurologic: Alert and oriented X 3        Psych: Affect is pleasant   Grooming is adequate, nails painted             Lab Results:  Personally Reviewed.   Last Comprehensive Metabolic Panel:  Sodium   Date Value Ref Range Status   06/29/2022 135 (L) 136 - 145 mmol/L Final   06/29/2022 135 (L) 136 - 145 mmol/L Final     Potassium   Date Value Ref Range Status   06/29/2022 4.3 3.5 - 5.0 mmol/L Final   06/29/2022 4.3 3.5 - 5.0 mmol/L Final     Chloride   Date Value Ref Range Status   06/29/2022 106 98 - 107 mmol/L Final   06/29/2022 106 98 - 107 mmol/L Final     Carbon Dioxide (CO2)   Date Value Ref Range Status   06/29/2022 21 (L) 22 - 31 mmol/L Final   06/29/2022 21 (L) 22 - 31 mmol/L Final     Anion Gap   Date Value Ref Range Status   06/29/2022 8 5 - 18 mmol/L Final   06/29/2022 8 5 - 18 mmol/L Final     Glucose   Date Value Ref Range Status   06/29/2022 74 70 - 125 mg/dL Final   06/29/2022 74 70 - 125 mg/dL Final     Urea Nitrogen   Date Value Ref Range Status   06/29/2022 25 (H) 8 - 22 mg/dL Final   06/29/2022 25 (H) 8 - 22 mg/dL Final     Creatinine   Date Value Ref Range Status   06/29/2022 0.73 0.60 - 1.10 mg/dL Final   06/29/2022 0.73 0.60 - 1.10 mg/dL Final     GFR Estimate   Date Value Ref Range Status   06/29/2022 >90 >60 mL/min/1.73m2 Final     Comment:     Effective December 21, 2021 eGFRcr in adults is calculated using the 2021 CKD-EPI creatinine equation which includes age and gender ( et al., NEJM, DOI:  10.1056/DVNOfq9423566)   06/29/2022 >90 >60 mL/min/1.73m2 Final     Comment:     Effective December 21, 2021 eGFRcr in adults is calculated using the 2021 CKD-EPI creatinine equation which includes age and gender (Jackson et al., [facility], DOI: 10.1056/NDAMrx9628153)  Effective December 21, 2021 eGFRcr in adults is calculated using the 2021 CKD-EPI creatinine equation which includes age and gender (Jackson et al., [facility], DOI: 10.1056/UFWRui5275794)   06/21/2019 >60 >60 mL/min/1.73m2 Final     Calcium   Date Value Ref Range Status   06/29/2022 8.2 (L) 8.5 - 10.5 mg/dL Final   06/29/2022 8.2 (L) 8.5 - 10.5 mg/dL Final        UA: No results found for: UAMP, UBARB, BENZODIAZEUR, UCANN, UCOC, OPIT, UPCP         Total time spent 27 minutes with greater than 50% in consultation, education and coordination of care.     Also discussed with MD Dr. Talamantes.         Blossom Ponce APRN, CNS-BC, CNP, ACHPN  Acute Care Pain Management Program Hour 7a-1700  [facility] ([place], [place], [place])   Page via [employer]- Click HERE to page Blossom or call [phone]

## 2022-07-01 NOTE — CONSULTS
Addiction Medicine Consultation    Rachel De Jesus, 1965, [id number]    Primary:  Kika Leblanc, [phone]       Tele-Visit Details    Type of service:  Video Visit  Video Start Time (time video started): 1202  Video End Time (time video stopped): 1248  Originating Location (pt. Location): Patient's room  Distant Location (provider location): On site at [facility]  Reason for Televisit: COVID-19  Mode of Communication:  Video Conference via polycom  Physician has received verbal consent for a video visit from the patient? Yes    Assessment and Plan:     Principal Problem:    Melena  Active Problems:    C3 cervical fracture (H)    ARJUN (acute kidney injury) (H)    Hyperkalemia    Hyponatremia    Abnormal LFTs    Asthma    Hepatic steatosis    Chronic low back pain    Depression    Fibromyalgia    HTN (hypertension)    Acute kidney injury (H)    Closed fracture of transverse process of cervical vertebra, initial encounter (H)      Rachel De Jesus is a 56 year old old female with severe alcohol use disorder who presented after a third fall this week while intoxicated.  Admitted with melena and consequent anemia, in addition to a C3 fracture of the transverse process.  Patient did not have any significant withdrawal since hospitalization and did not require any benzodiazepines.  She is on gabapentin 900 mg 3 times daily which she reports has helped with anxiety and pain.  She has no cravings for alcohol at this time.  Patient is highly motivated to quit smoking and drinking now and is interested in treatment.    Recommendations:  1. Continue gabapentin 900 mg 3 times daily at discharge (prescription sent to outpatient pharmacy)  2. Discussed smoking cessation and nicotine patch was sent to her pharmacy.  3. Consider naltrexone at follow-up.  (I have not ordered this because she required some oxycodone during this hospitalization.)  4. Recheck LFTs within a  "month.  5. Substance use assessment-I recommend that if at all possible this be completed while she is in the hospital.  6. We discussed going to AA.  7. Residential or minimally IOP for alcohol use disorder.  8. Discussed checking her blood pressure and heart rate since her hypertension will most likely improve with alcohol and tobacco cessation.    For questions and concerns, please contact me through the [employer] nicholas (or [facility] paging.)    Chief Complaint:  Fall     HPI:    Rachel De Jesus is a 56 year old old female with a past medical history significant for anxiety, depression, chronic low back pain, fibromyalgia, hypertension, hepatic steatosis, asthma who presented to [facility] for concerns of melena and fall which resulted in a C3 transverse process fracture.  CT of the head was negative for acute process.  Syncope likely secondary to anemia and/or GI bleed    EGD 2022 revealed large grade B reflux esophagitis with benign-appearing esophageal stricture, gastritis as well as nonobstructing nonbleeding duodenal ulcer with pigmented material.  Pathology report is pending.    Per previous documentation by pain team, patient reports drinking 4-5 drinks per week but family is concerned about her using more than that.  Fall occurred while drinking.  Patient was more forthcoming about her drinking and reports at least 8-9 cocktails that are \"big\" daily.  She drinks at least 2 L of vodka every week and wine in addition to that.  She started drinking more heavily in  after her parents and brother  within months of each other.  Her  then  several years later and since then, her drinking escalated even more.    Highly motivated to stop drinking and smoking altogether.  She is never been on pharmacotherapy for alcohol use disorder and never been to treatment.  She denies any other substance use past or present.  No history of seizures, DTs or alcohol withdrawal.      Current " symptoms:  Anxiety resolved  Pain in the neck but manageable and improving on gabapentin    Medical History  Past Medical History:   Diagnosis Date     Asthma      Chronic low back pain      Depression      Fibromyalgia      Hepatic steatosis      HTN (hypertension)      MRSA infection      Obesity        Surgical History  Past Surgical History:   Procedure Laterality Date     ESOPHAGOSCOPY, GASTROSCOPY, DUODENOSCOPY (EGD), COMBINED N/A 2022    Procedure: ESOPHAGOGASTRODUODENOSCOPY (EGD);  Surgeon: Nils Jaeger MD;  [facility]     EXCISE BREAST CYST/FIBROADENOMA/TUMOR/DUCT LESION/NIPPLE LESION/AREOLAR LESION      Description: Breast Surgery Lumpectomy;  Recorded: 2014;     [facility] REMOVAL GALLBLADDER      Description: Cholecystectomy;  Recorded: 2014;     [facility] REMOVAL OF TONSILS,<11 Y/O      Description: Tonsillectomy;  Recorded: 2014;     [facility] CERV SPINE FUSN,ANTER,BELOW C2      Description: Cervical Vertebral Fusion;  Recorded: 2014;     [facility] GARY W/O FACETEC FORAMOT/DSKC / VRT SEG, CERVICAL      Description: Laminectomy Lumbar;  Recorded: 2014;     [facility] TOTAL ABDOM HYSTERECTOMY      Description: Hysterectomy;  Recorded: 2014;       Social History  Current living situation: Lives alone in [place]    Her daughter Angella is local and willing to help patient once she leaves the hospital.  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 She was adopted.    Prior to Admission Medications   Medications Prior to Admission  "  Medication Sig Dispense Refill Last Dose     albuterol (PROAIR HFA/PROVENTIL HFA/VENTOLIN HFA) 108 (90 Base) MCG/ACT inhaler Inhale 2 puffs into the lungs every 4 hours as needed   Unknown at Unknown time     celecoxib (CELEBREX) 200 MG capsule Take 400 mg by mouth At Bedtime   6/27/2022 at Unknown time     DULoxetine (CYMBALTA) 60 MG capsule Take 120 mg by mouth At Bedtime   6/27/2022 at Unknown time     fluticasone (FLONASE) 50 MCG/ACT nasal spray Spray 2 sprays in nostril daily   6/28/2022 at Unknown time     lisinopril (ZESTRIL) 20 MG tablet Take 20 mg by mouth At Bedtime   6/27/2022 at Unknown time     loratadine (CLARITIN) 10 MG tablet Take 10 mg by mouth daily as needed for allergies   Unknown at Unknown time     ondansetron (ZOFRAN ODT) 4 MG ODT tab Take 4 mg by mouth every 8 hours as needed for nausea   Unknown at Unknown time       Allergies  Allergies   Allergen Reactions     Gabapentin Nausea and Vomiting        Review of Systems:    A 10 point review of systems was completed and negative apart from that which is mentioned in the HPI.      Physical Exam:    BP (!) 144/67 (BP Location: Left arm)   Pulse 69   Temp 97.7  F (36.5  C) (Oral)   Resp 18   Ht 1.6 m (5' 3\")   Wt 63.9 kg (140 lb 14 oz)   SpO2 99%   BMI 24.95 kg/m      Standard physical exam not performed today since this encounter is via telehealth during the COVID-19 pandemic.  The exams performed by previous providers are personally reviewed in the chart.    General appearance   Gen: awake, alert, and oriented   Dermatologic   No rash. No piloerection or diaphoresis. No jaundice  HEENT   EOMI.    No yawning  Neck collar in place  Pulmonary   No respiratory distress. No cough noted.  Neurologic   Oriented to person, place, time and situation   No Tremor      Results:  Lab Results personally reviewed.  ALT 79, AST 79 (decreasing), bilirubin total 0.5  Hypomagnesemia and hypophosphatemia noted   Platelets 142 (202), macrocytic anemia " noted  INR 0.93  Hepatitis panel nonreactive       personally reviewed and shows:  No fills    Yadira Talamantes MD  Addiction Medicine

## 2022-07-01 NOTE — PLAN OF CARE
Physical Therapy Discharge Summary    Reason for therapy discharge:    Discharged to home.    Progress towards therapy goal(s). See goals on Care Plan in [facility] electronic health record for goal details.  Goals partially met.  Barriers to achieving goals:   discharge from facility.    Therapy recommendation(s):    Further recommended therapy is related to documented deficits, and is necessary to maximize functional independence in order for patient to return to prior level of function.      Aidee Dukes, DEISY 7/1/2022

## 2022-07-01 NOTE — PLAN OF CARE
Problem: Plan of Care - These are the overarching goals to be used throughout the patient stay.    Goal: Optimal Comfort and Wellbeing  Outcome: Ongoing, Progressing     Problem: Hypertension Comorbidity  Goal: Blood Pressure in Desired Range  Outcome: Ongoing, Progressing  Intervention: Maintain Blood Pressure Management  Recent Flowsheet Documentation  Taken 7/1/2022 0230 by Catrachita Jane RN  Medication Review/Management: medications reviewed  Taken 6/30/2022 2230 by Catrachita Jane RN  Medication Review/Management: medications reviewed     Problem: Pain Acute  Goal: Acceptable Pain Control and Functional Ability  Outcome: Ongoing, Progressing  Intervention: Prevent or Manage Pain  Recent Flowsheet Documentation  Taken 7/1/2022 0230 by Catrachita Jane RN  Medication Review/Management: medications reviewed  Taken 6/30/2022 2230 by Catrachita Jane RN  Medication Review/Management: medications reviewed     Problem: Bleeding (Gastrointestinal Bleeding)  Goal: Hemostasis  Outcome: Ongoing, Progressing    Pt denies pain or nausea overnight. CIWA score is 0. Pt slept on and off overnight. [place] J collar in place. Pt independent in room. VS unchanged.    Catrachita Jane RN

## 2022-07-01 NOTE — DISCHARGE INSTRUCTIONS
PHONE APPOINTMENT WITH [facility] - Tues, July 5, 2022, at 11 am on your cell phone.    Substance Abuse Resources    To access substance abuse treatment you must have an assessment complete or have an updated assessment within 30 days of starting any program.  Information for this to be completed and to secure funding if you have medical assistance or no insurance can be found through your [facility]'s chemical health intake line.    If you have private insurance, call the customer service number on the back of your insurance card to find an in-network provider for substance abuse assessment. The ideal provider will be a treatment facility, licensed in the [facility].    For those with Medicaid with the [facility], you will need a Rule 25 assessment: The following are phone numbers for each [facility]. Rule 25 assessments must be completed by the county you reside in currently. Once approved for funding you can connect with a facility that does Rule 25 assessment.  [facility] [phone]  [place] - [phone]      The following facilities offer Rule 25/chemical health assessments:    [facility]  [phone]  Mon-Friday: [address] [facility], [address]  Saturday:  [address], [place], [address]  M-F assessments (7a-1:45p); Saturday assessments (7:45-10:45a)    [facility]  [phone]  [address] [place], [place], [address]  *by appointment only M-W; walk ins available Fridays from 10-2.    [facility]  [phone] (phone consultation available 24/7)  In-person Assessments:  [address], [place] [address]  [address] [address] [place] [address]  [address] [place] [address]  [address] [place] [address]      [facility]  [phone]  [address] [place] [address], [address]  *by appointment only M-Th    [facility]  [phone]  [address] [place] [address], [address]  *walk ins  available M-F    [facility]  [phone]  [address] [place], [place], [address]  *available by appointments only    [facility] ([facility])  [phone]  [address]  [place], [place], [address]  *Walk in assessments also available M-F starting at 8 am.    [facility]  [phone]  [address] [place], [place], [address]    *available by appointments      [facility]  [phone]  [address] [place], [place], [address]  *walk in assessments available M-F starting at 7 am    [facility]  [phone]  [address] [place] [address]    [facility]  [phone]  [address] [place], [place], [address]    *available by appointment only    [facility]  [phone]  [address]  [place], [place], [address]    [facility]  [phone]  [address], [address]  *available by appointment    [facility]  [phone]  [address] [place], [place], [address]  *walk ins available M-TH from 9-3    [facility]  [email or web address]  [phone], [address], [place] [address]  [phone], [address], [place], [place] [address] [phone], [address] [facility], [address], [place], [place] [address] [phone], [address], ([facility]), [place], [place] [id number] [phone], [address] [address] [place] [address]      If you are intoxicated, you may be required to detox at a detox facility before starting treatment. The following are detox facilities that you can self present to. All detox facilities are able to help you complete an assessment/rule 25 prior to discharge if you choose:      [facility]: [address] [place], [place], [address].         [phone]    [facility]: [address] [facility] [place], [address]  [phone]    [facility]: [address] [facility], [place], [place], [address] [phone]    [facility]: [address], [place], [place], [address]  [phone]            Recommendations:  It is recommended that you abstain from all mood altering chemicals. Please contact the sober support hotline (897-559-8656) as needed; phones are answered 24 hours a day, 7 days a week. Other support hotlines include:    [facility] Mental Health & Addiction: [phone]       Ways to help cope with sobriety:    -- Take prescribed medicines as scheduled  -- Keep follow-up appointments  -- Talk to others about your concerns  -- Get regular exercise    -- Practice deep breathing skills  -- Eat a healthy diet  -- Use community resources, including hotline numbers, [facility] crisis and support meetings  -- Stay sober and avoid places/people/things associated with substance use    --Maintain a daily schedule/routine    --Get at least 7-8 hours of sleep per night    --Create a list 10--20 healthy activities that you can do that are enjoyable and do not involve substance use    --Create daily goals (approx. 1-4 goals) per day and work to achieve them throughout the day.          Free Resources:    [place] [facility] ([facility])  [facility] connects people seeking recovery to resources that help foster and sustain long-term recovery. Whether you are seeking resources for treatment, transportation, housing, job training, education, health care or other pathways to recovery, [facility] is a great place to start.    Phone: [phone]. www.[place]Tintri.org (Great listing of all types of recovery and non-recovery related resources)      Alcoholics Anonymous    Phone: 1-971-ALCOHOL    Website: HTTP://WWW.AA.ORG/      [facility] [place] (879-355-5024 or http://aaminneaPax8.org)    [facility] [place] (303-559-1645 or www.aastpaul.org)      [facility]    [address] [place] [address], [address], [facility]  [place],    Phone: [phone]    Drop-in Hours: Monday-Friday 9-11:30 am. By appointment at other times.    Provides: [facility] is a drop-in center on the [facility] that provides a safe space for individuals who are homeless and have a history of chemical use. Sobriety is not a requirement but drugs and alcohol are not allowed on the property.    Services: Non-clients can access drop-in services such as Recovery and Harm Reduction Groups, referrals to case management, community activities, shower facilities, and a pool table. Individuals who are homeless and have chemical health needs may be eligible for enrollment into [facility]'s case management program. Clients and  work together to access benefits, treatment, health care, shelter, and external housing resources.         [facility]    [address] [place] [address]    Phone: [phone]    Hours: Monday-Friday 8 am-5 pm.    Provides: Rule 25 assessment and referral for individuals seeking treatment or counseling for chemical dependency. Must be a resident of [facility]. There is no fee for assessment. There is some funding available for treatment programs.

## 2022-07-01 NOTE — PLAN OF CARE
Occupational Therapy Discharge Summary    Reason for therapy discharge:    Discharged to home.    Progress towards therapy goal(s). See goals on Care Plan in [facility] electronic health record for goal details.  Goals met    Therapy recommendation(s):    No further therapy is recommended.     Issues pt a sock aid. Pt is knowledgeable in how to use it.

## 2022-07-01 NOTE — PLAN OF CARE
Problem: Pain Acute  Goal: Acceptable Pain Control and Functional Ability  Outcome: Ongoing, Progressing  Intervention: Prevent or Manage Pain  Recent Flowsheet Documentation  Taken 7/1/2022 0800 by Aury Lacy RN  Medication Review/Management: medications reviewed  Pt reports minimal pain with activity but did not want any med. Reposition and neck collar helpful.      Problem: Plan of Care - These are the overarching goals to be used throughout the patient stay.    Goal: Absence of Hospital-Acquired Illness or Injury  Intervention: Identify and Manage Fall Risk  Recent Flowsheet Documentation  Taken 7/1/2022 0800 by Aury Lacy, RN  Safety Promotion/Fall Prevention:    clutter free environment maintained    nonskid shoes/slippers when out of bed    patient and family education    safety round/check completed   Pt had no safety incident during this shift. Ambulated in the hallways with assist of 1. Pt ambulating independently.      Discharge instructions and discharge med given to pt Pt verbalized understanding. PIV removed; pt tolerated well; miinmal bleeding. Pt discharged at 3:15 pm with wheelchair assist. Friend here to pick her up.    Goal Outcome Evaluation:

## 2022-07-14 ENCOUNTER — HOSPITAL ENCOUNTER (OUTPATIENT)
Dept: RADIOLOGY | Facility: HOSPITAL | Age: 57
Discharge: HOME | End: 2022-07-14
Attending: NEUROLOGICAL SURGERY | Admitting: NEUROLOGICAL SURGERY
Payer: COMMERCIAL

## 2022-07-14 ENCOUNTER — OFFICE VISIT (OUTPATIENT)
Dept: NEUROSURGERY | Facility: CLINIC | Age: 57
End: 2022-07-14
Payer: COMMERCIAL

## 2022-07-14 VITALS
WEIGHT: 140 LBS | HEIGHT: 63 IN | HEART RATE: 74 BPM | BODY MASS INDEX: 24.8 KG/M2 | DIASTOLIC BLOOD PRESSURE: 60 MMHG | SYSTOLIC BLOOD PRESSURE: 156 MMHG | OXYGEN SATURATION: 98 %

## 2022-07-14 DIAGNOSIS — S12.200A C3 CERVICAL FRACTURE (H): ICD-10-CM

## 2022-07-14 DIAGNOSIS — S12.201D CLOSED NONDISPLACED FRACTURE OF THIRD CERVICAL VERTEBRA WITH ROUTINE HEALING, UNSPECIFIED FRACTURE MORPHOLOGY, SUBSEQUENT ENCOUNTER: Primary | ICD-10-CM

## 2022-07-14 PROCEDURE — 99213 OFFICE O/P EST LOW 20 MIN: CPT | Performed by: NURSE PRACTITIONER

## 2022-07-14 PROCEDURE — 72040 X-RAY EXAM NECK SPINE 2-3 VW: CPT

## 2022-07-14 NOTE — PROGRESS NOTES
"      Neurosurgery Progress Note  07/14/22    A/P: Rachel De Jesus is a 56 year old female who is s/p hospital consult for fall while intoxicated resulting in C3 TP fracture through the transverse foramen. No vertebral artery injury though does have plaque causing moderate stenosis. Rachel has been wearing her collar during the day but has been sleeping with it off. Denies neck pain. XR today stable.     Plan four weeks with CT to ensure stability/healing then release from collar  Would normally refer to neurology for vertebral artery stenosis but patient would like to avoid  Recommended discussion with pcp regarding atherosclerotic plaque (should patient be on a statin?)  If PCP unable to help, would recommend neurology referral  Okay from neurosurgery perspective to have hip surgery, keep collar in place - neutral intubation    Discussed with DR. Conner    S: Feeling okay. In an aspen collar, does not like the way it fits. No neck pain. Had some left sided bruising. Tells me she has been sober since the injury.   PMH: No interval change  PSH: No interval change    ROS:A full 14 point review of systems was otherwise completed and is negative aside from that mentioned above in the HPI    O:  BP (!) 156/60   Pulse 74   Ht 5' 3\" (1.6 m)   Wt 140 lb (63.5 kg)   SpO2 98%   BMI 24.80 kg/m    General: Awake, alert, NAD  HEENT: AT/NC, EOMI, face symmetric, oral mucosa moist and pink  Heart: RRR: Nonlabored respirations without accessory muscle use.  Abd: S, NT, ND  Neuro: Awake, alert, speech is clear and content is appropriate. MAEW x 4 with full strength in b/l UE and LE. Sensation is intact totemperature and LT.   Coordination: intact  Musculoskeletal: gait antalgic to the hip   Psych: Appropriatemood and affect, pleasant, cooperative, alert and oriented x 3  Skin: no rashes, lesions   Labs: personally reviewed   Lab Results   Component Value Date     06/29/2022     06/29/2022    CO2 21 " 06/29/2022    CO2 21 06/29/2022    BUN 25 06/29/2022    BUN 25 06/29/2022     Recent Labs   Lab Test 07/01/22  0416 06/30/22  1449 06/30/22  0625 06/29/22  1856 06/29/22  0654 06/28/22  1938 06/28/22  1333 06/21/19  1134   WBC  --   --   --   --  4.1  --  6.2 5.4   HGB 9.4* 10.2* 9.4* 10.3* 10.1*  10.1* 10.6* 12.5 13.4   HCT  --   --   --   --  30.4*  --  36.6 39.6   MCV  --   --   --   --  119*  --  114* 108*   PLT  --   --   --   --  142*  --  202 204     Recent Labs   Lab Test 06/28/22  1333 06/21/19  1134   INR 0.93 0.91   PTT  --  25         I personally visualized all imaging. XR reviewed  Fracture not well visualized  No change in alignment      Mia Gomes CNP  [facility] Neurosurgery  O: [phone]

## 2022-07-14 NOTE — LETTER
"    7/14/2022         RE: Rachel De Jesus  [address]   [place] [address]        Dear Colleague,    Thank you for referring your patient, Rachel De Jesus, to the [facility]. Please see a copy of my visit note below.          Neurosurgery Progress Note  07/14/22    A/P: Rachel De Jesus is a 56 year old female who is s/p hospital consult for fall while intoxicated resulting in C3 TP fracture through the transverse foramen. No vertebral artery injury though does have plaque causing moderate stenosis. Rachel has been wearing her collar during the day but has been sleeping with it off. Denies neck pain. XR today stable.     Plan four weeks with CT to ensure stability/healing then release from collar  Would normally refer to neurology for vertebral artery stenosis but patient would like to avoid  Recommended discussion with pcp regarding atherosclerotic plaque (should patient be on a statin?)  If PCP unable to help, would recommend neurology referral  Okay from neurosurgery perspective to have hip surgery, keep collar in place - neutral intubation    Discussed with DR. Conner    S: Feeling okay. In an aspen collar, does not like the way it fits. No neck pain. Had some left sided bruising. Tells me she has been sober since the injury.   PMH: No interval change  PSH: No interval change    ROS:A full 14 point review of systems was otherwise completed and is negative aside from that mentioned above in the HPI    O:  BP (!) 156/60   Pulse 74   Ht 5' 3\" (1.6 m)   Wt 140 lb (63.5 kg)   SpO2 98%   BMI 24.80 kg/m    General: Awake, alert, NAD  HEENT: AT/NC, EOMI, face symmetric, oral mucosa moist and pink  Heart: RRR: Nonlabored respirations without accessory muscle use.  Abd: S, NT, ND  Neuro: Awake, alert, speech is clear and content is appropriate. MAEW x 4 with full strength in b/l UE and LE. Sensation is intact totemperature and LT.   Coordination: " intact  Musculoskeletal: gait antalgic to the hip   Psych: Appropriatemood and affect, pleasant, cooperative, alert and oriented x 3  Skin: no rashes, lesions   Labs: personally reviewed   Lab Results   Component Value Date     06/29/2022     06/29/2022    CO2 21 06/29/2022    CO2 21 06/29/2022    BUN 25 06/29/2022    BUN 25 06/29/2022     Recent Labs   Lab Test 07/01/22  0416 06/30/22  1449 06/30/22  0625 06/29/22  1856 06/29/22  0654 06/28/22  1938 06/28/22  1333 06/21/19  1134   WBC  --   --   --   --  4.1  --  6.2 5.4   HGB 9.4* 10.2* 9.4* 10.3* 10.1*  10.1* 10.6* 12.5 13.4   HCT  --   --   --   --  30.4*  --  36.6 39.6   MCV  --   --   --   --  119*  --  114* 108*   PLT  --   --   --   --  142*  --  202 204     Recent Labs   Lab Test 06/28/22  1333 06/21/19  1134   INR 0.93 0.91   PTT  --  25         I personally visualized all imaging. XR reviewed  Fracture not well visualized  No change in alignment      Mia Gomes CNP  [facility] Neurosurgery  O: [phone]              Again, thank you for allowing me to participate in the care of your patient.        Sincerely,        Mia Gomes NP

## 2022-07-15 ENCOUNTER — OFFICE VISIT (OUTPATIENT)
Dept: ADDICTION MEDICINE | Facility: CLINIC | Age: 57
End: 2022-07-15
Payer: COMMERCIAL

## 2022-07-15 VITALS
SYSTOLIC BLOOD PRESSURE: 154 MMHG | HEART RATE: 81 BPM | WEIGHT: 140 LBS | DIASTOLIC BLOOD PRESSURE: 101 MMHG | HEIGHT: 63 IN | BODY MASS INDEX: 24.8 KG/M2

## 2022-07-15 DIAGNOSIS — F10.20 ALCOHOL USE DISORDER, SEVERE, DEPENDENCE (H): Primary | ICD-10-CM

## 2022-07-15 DIAGNOSIS — S12.9XXA CLOSED FRACTURE OF TRANSVERSE PROCESS OF CERVICAL VERTEBRA, INITIAL ENCOUNTER (H): ICD-10-CM

## 2022-07-15 PROCEDURE — 99204 OFFICE O/P NEW MOD 45 MIN: CPT | Performed by: NURSE PRACTITIONER

## 2022-07-15 RX ORDER — GABAPENTIN 300 MG/1
900 CAPSULE ORAL EVERY 8 HOURS
Qty: 270 CAPSULE | Refills: 0 | Status: ON HOLD | OUTPATIENT
Start: 2022-07-15 | End: 2022-08-08

## 2022-07-15 ASSESSMENT — ANXIETY QUESTIONNAIRES
6. BECOMING EASILY ANNOYED OR IRRITABLE: NOT AT ALL
1. FEELING NERVOUS, ANXIOUS, OR ON EDGE: NOT AT ALL
7. FEELING AFRAID AS IF SOMETHING AWFUL MIGHT HAPPEN: NOT AT ALL
5. BEING SO RESTLESS THAT IT IS HARD TO SIT STILL: NOT AT ALL
2. NOT BEING ABLE TO STOP OR CONTROL WORRYING: NOT AT ALL
GAD7 TOTAL SCORE: 0
4. TROUBLE RELAXING: NOT AT ALL
GAD7 TOTAL SCORE: 0
3. WORRYING TOO MUCH ABOUT DIFFERENT THINGS: NOT AT ALL

## 2022-07-15 ASSESSMENT — PATIENT HEALTH QUESTIONNAIRE - PHQ9: SUM OF ALL RESPONSES TO PHQ QUESTIONS 1-9: 0

## 2022-07-15 NOTE — PATIENT INSTRUCTIONS
It was so nice to meet you today, Rachel!    Continue gabapentin 900 mg TID.    [facility]  [address] [place], [place] (off [address])    Check [facility] website for meeting . Click (Find meeting)    [place] [address]     [facility] [address] Tuesday 630-830 pm. [facility] Saturday 4 and 6 pm & Sundays 9 and 11 Sunday  11:[facility]  In-person  6:00 [facility]  In-person  8:00 [facility]  In-person  Monday  9:00 [facility]  In-person  10:30 [facility]  In-person  6:00 [facility]  (Men)  In-person  6:00 [facility]  (Women)  In-person  6:00 pmMonday Night [facility] Group  (Men)  In-person  8:00 [facility]  In-person  Tuesday  9:00 [facility]  In-person  10:30 am[facility]  In-person  6:00 pm[facility]  In-person  6:00 pm[facility]  In-person  7:30 pm[facility]  In-person  8:00 pm[facility]  In-person  10:00 pm[facility]  In-person  Wednesday  9:00 [facility]  In-person  10:30 am[facility]  In-person  6:00 pm[facility]  In-person  6:00 pm[facility]  (Women)  In-person  8:00 pm[facility]  In-person  Thursday  9:00 am[facility]  In-person  10:30 am[facility]  In-person  6:00 pm[facility]  In-person  8:00 pm[facility]  In-person  Friday  9:00 [facility]  In-person  10:30 [facility]  In-person  6:00 pm[facility]  In-person  6:00 pm[facility]  In-person  6:00 pm[facility]  In-person  8:00 [facility]  In-person  8:00 [facility]  In-person  Saturday  9:00 [facility]  In-person  10:30 [facility]  In-person  6:00 [facility]  In-person  8:00 [facility]  In-person

## 2022-07-15 NOTE — PROGRESS NOTES
Reason for visit: Establish care for ongoing addiction management  UA obtained: Yes  POC results: BZO Pos  Negative for remaining tests    Patient verified allergies, medications and pharmacy verbally with writer.     GRICELDA-7 scores:    GRICELDA-7 SCORE 7/15/2022   Total Score 0     PHQ-9 scores:   PHQ-9 SCORE 7/15/2022   PHQ-9 Total Score 0   Patient states she  is ready for visit.  [place]  to be reviewed by provider.   Tracy Vinson July 15, 2022 2:45 PM

## 2022-07-15 NOTE — PROGRESS NOTES
[facility] ADDICTION MEDICINE  INITIAL VISIT                                                      SUBJECTIVE                                                    CC: Patient presents with:  Consult      Primary care provider: Kika Leblanc MD       Visit performed In Person, face-to-face      HPI:    Rachel De Jesus is a 56 year old female with a history of severe alcohol use disorder, GI bleed chronic pain, duodenal ulcer, hepatic steatosis  who presents for initial visit for addiction consultation and management referred by Roxie Talamantes MD for management of Alcohol Use Disorder (AUD)    Rachel was initially saw by addiction medicine during her most recent hospitalization from 6/28- 7/1/2022.  She was hospitalized following multiple falls while intoxicated sustaining a C3 fracture of the transverse process, melena found to have a duodenal ulcer on EGD.  She initially was not forthcoming with her alcohol intake during her hospitalization but tells me today that she did end up telling the truth about how much she was drinking.  States that week leading up to her hospitalization she was drinking from the time she woke up to she went to sleep at night.  She she states that she was drinking (2) 1.75 L of vodka every week for the since the pandemic due to isolation.  She states that she drink a couple of drinks weekly prior to that however in reviewing her initial consultation she reported that her drinking began to escalate in 2012.  Her last drink was 6/28/2022.  She is very motivated to her recovery.  She is in the process of starting IOP at [facility].  She has attended a few AA meetings since her discharge from the hospital.  She denies cravings or urges to drink at this time.  She is taking gabapentin 900 mg 3 times a day for cravings, postacute withdrawal symptoms, and pain.    She has severe right hip pain 8 out of 10 that impairs her mobility.  She ambulates with a severe limp favoring her  left leg.  She was scheduled for surgery however it was canceled due to hypertension.  She needs to schedule a follow-up with Ortho.  Her pain was a contributor to her alcohol use.      She denies any other substance use.  She states that she was prescribed Xanax years ago and had a leftover prescription.  She does not take 0.5 mg on occasion to help with pain last taken last night. CD History:     ETOH:  First use: Started drinking at age 11   amount/frequency: She reports drinking a couple of drinks twice weekly prior to the pandemic.  Her drinking increased to daily use consuming (2) 1.75 Liters of vodka every week.   Last use: 6/28/22  History of seizures: Denies  History of DTs: Denies  Use of alcohol pharmacotherapies: Naltrexone was started on and during her hospitalization    Opioids:  Denies use    Amphetamines:  Denies use    Benzodiazepines:  Type and method of use: Prescribed Xanax years ago, has old prescription on hand.  Took 0.5 mg yesterday to help with pain.      Cannabis:  Denies        Previous treatments:  Never been to treatment.  Is starting IOP at [facility]    Tobacco Use:  Quit on 6/28/2022      Infectious Disease Screening:  Hep C: Nonreactive 6/29/2022  HIV: None on file    Pregnancy Status:    LMP: Postmenopausal      PAST PSYCHIATRIC HISTORY  Past diagnoses -depression and anxiety  Current or past psychiatrist: Denies  Current or past therapist: Denies  Hospitalizations/commitment -denies  Suicide Attempts -denies  Medication trials -Cymbalta 120 mg     PHQ-9 scores:  PHQ-9 SCORE 7/15/2022   PHQ-9 Total Score 0     GRICELDA-7 scores:  GRICELDA-7 SCORE 7/15/2022   Total Score 0       MEDICAL HISTORY:  Problem list, allergies, and histories reviewed & adjusted, as indicated.  Additional history: as documented     Patient Active Problem List    Diagnosis Date Noted     Alcohol use disorder, severe, dependence (H) 07/01/2022     Priority: Medium     Acute kidney injury (H) 06/30/2022     Priority:  Medium     Closed fracture of transverse process of cervical vertebra, initial encounter (H) 06/30/2022     Priority: Medium     Melena 06/28/2022     Priority: Medium     C3 cervical fracture (H) 06/28/2022     Priority: Medium     ARJUN (acute kidney injury) (H) 06/28/2022     Priority: Medium     Hyperkalemia 06/28/2022     Priority: Medium     Hyponatremia 06/28/2022     Priority: Medium     Abnormal LFTs 06/28/2022     Priority: Medium     Asthma 06/28/2022     Priority: Medium     Hepatic steatosis 06/28/2022     Priority: Medium     Chronic low back pain 06/28/2022     Priority: Medium     Depression 06/28/2022     Priority: Medium     Fibromyalgia 06/28/2022     Priority: Medium     HTN (hypertension) 06/28/2022     Priority: Medium     Obesity 06/28/2022     Priority: Medium     Enthesopathy of hip region 09/17/2014     Priority: Medium     Backache 09/17/2014     Priority: Medium     Problem list name updated by automated process. Provider to review       Cervicalgia 09/17/2014     Priority: Medium       Past Medical History:   Diagnosis Date     Alcohol use disorder, severe, dependence (H) 7/1/2022     Asthma      Chronic low back pain      Depression      Fibromyalgia      Hepatic steatosis      HTN (hypertension)      MRSA infection      Obesity        Past Surgical History:   Procedure Laterality Date     ESOPHAGOSCOPY, GASTROSCOPY, DUODENOSCOPY (EGD), COMBINED N/A 6/29/2022    Procedure: ESOPHAGOGASTRODUODENOSCOPY (EGD);  Surgeon: Nils Jaeger MD;  Location: [facility] [place] OR     EXCISE BREAST CYST/FIBROADENOMA/TUMOR/DUCT LESION/NIPPLE LESION/AREOLAR LESION      Description: Breast Surgery Lumpectomy;  Recorded: 07/09/2014;     HC REMOVAL GALLBLADDER      Description: Cholecystectomy;  Recorded: 07/09/2014;     HC REMOVAL OF TONSILS,<11 Y/O      Description: Tonsillectomy;  Recorded: 07/09/2014;     ZZC CERV SPINE FUSN,ANTER,BELOW C2      Description: Cervical Vertebral Fusion;  Recorded:  07/09/2014;     [facility] GARY W/O FACETEC FORAMOT/DSKC 1/2 VRT SEG, CERVICAL      Description: Laminectomy Lumbar;  Recorded: 07/09/2014;     [facility] TOTAL ABDOM HYSTERECTOMY      Description: Hysterectomy;  Recorded: 07/09/2014;       Family History   Adopted: Yes   Problem Relation Age of Onset     Breast Cancer Mother        SOCIAL HISTORY:    Living situation:   Alone   Single///partner     Children  2 kids, 3 grandkids   Support system:  Many friends and family   Employment:  Retired   Barriers to Care (transportation, childcare, etc):  Denies   Upcoming Court Date(s):  Denies   Consent to Communicate?   N/A       ALLERGIES & CURRENT MEDICATIONS:  Allergies   Allergen Reactions     Gabapentin Nausea and Vomiting     Pt does not believe this to a true allergy         Current Outpatient Medications   Medication Sig Dispense Refill     albuterol (PROAIR HFA/PROVENTIL HFA/VENTOLIN HFA) 108 (90 Base) MCG/ACT inhaler Inhale 2 puffs into the lungs every 4 hours as needed       DULoxetine (CYMBALTA) 60 MG capsule Take 120 mg by mouth At Bedtime       fluticasone (FLONASE) 50 MCG/ACT nasal spray Spray 2 sprays in nostril daily       folic acid (FOLVITE) 1 MG tablet Take 1 tablet (1 mg) by mouth daily 30 tablet 0     gabapentin (NEURONTIN) 300 MG capsule Take 3 capsules (900 mg) by mouth every 8 hours 270 capsule 0     loratadine (CLARITIN) 10 MG tablet Take 10 mg by mouth daily as needed for allergies       ondansetron (ZOFRAN ODT) 4 MG ODT tab Take 4 mg by mouth every 8 hours as needed for nausea       pantoprazole (PROTONIX) 40 MG EC tablet Take 1 tablet (40 mg) by mouth daily 30 tablet 0     thiamine (B-1) 100 MG tablet Take 1 tablet (100 mg) by mouth daily 30 tablet 0       REVIEW OF SYSTEMS:  General: No acute withdrawal symptoms.  No recent infections or fever  Eyes:  No vision concerns.  No double vision.    Resp: No coughing, wheezing or shortness of breath  CV: No chest pains or palpitations  GI: No  "nausea, vomiting, abdominal pain, diarrhea.  No constipation  : No urinary frequency or dysuria    Musculoskeletal: R hip pain 8/10. Ankle edema  Neurologic: , problems with balance  Psychiatric: No acute concerns other than as above. See mental status exam for more information.   Skin: No rashes or areas of acute infection    OBJECTIVE                                                      LABS:  Labs reviewed.  Pertinent data include:   Urine tox results POS: benzodiazepines today.     No results found for any visits on 07/15/22.  AST   Date Value Ref Range Status   07/01/2022 36 0 - 40 U/L Final     ALT   Date Value Ref Range Status   07/01/2022 59 (H) 0 - 45 U/L Final       PHYSICAL EXAM:  BP (!) 154/101 (BP Location: Left arm, Patient Position: Sitting, Cuff Size: Adult Small)   Pulse 81   Ht 1.6 m (5' 3\")   Wt 63.5 kg (140 lb)   BMI 24.80 kg/m      GENERAL APPEARANCE: alert, comfortable appearing  EYES: Eyes grossly normal to inspection  HENT: TM's normal, mouth without ulcers or lesions, nose no rhinorrhea  NECK: no adenopathy, thyromegaly or masses  RESP: lungs clear to auscultation - no rales, rhonchi or wheezes and no resp distress  CV: regular rates and rhythm, normal S1 S2,no murmur   ABDOMEN: soft, nontender, without hepatosplenomegaly or masses  MS: extremities normal- no gross deformities noted, gait normal, peripheral pulses normal and no edema  SKIN: no rashes, no jaundice, no obvious masses.   NEURO: Normal strength and tone, sensory exam grossly normal, no tremor    MENTAL STATUS EXAM:  Appearance/Behavior:No appearant distress  Speech: Normal  Mood/Affect: normal affect  Insight: Adequate    [facility] Board of Pharmacy Data Base Reviewed.  Is consistent with patient reports and Epic records.    ASSESSMENT/PLAN                                                          ASSESSMENT/PLAN:  Rachel was seen today for consult.    Diagnoses and all orders for this visit:    Alcohol use disorder, " severe, dependence (H)  Adan has not had a drink since 6/28/2022.  She is using gabapentin 900 mg 3 times a day for postacute withdrawal symptoms, cravings, and pain.  She is motivated for her recovery and will begin IOP at [facility] as soon as there is an opening.  She is also attending AA.  Provided her resources for AA meetings including women's group.  Encouraged her to find a sponsor.  Discussed risks and benefits of naltrexone today, she is not experiencing cravings and declined a prescription today.  Encouraged her to reach out if she finds herself struggling with cravings and urges to drink and a prescription for naltrexone will be provided.  -     gabapentin (NEURONTIN) 300 MG capsule; Take 3 capsules (900 mg) by mouth every 8 hours    Closed fracture of transverse process of cervical vertebra, initial encounter (H)  C3 fracture sustained during one of her falls prior to admission.  She is wearing a neck brace during today's interview.  Taking Tylenol and gabapentin for pain.  Follow-up with PCP     ENCOUNTER FOR LONG TERM USE OF HIGH RISK MEDICATION   High Risk Drug Monitoring?  YES   Drug being monitored: Gabapentin   Reason for drug: Alcohol use Disorder   What is being monitored?: Dosage, Cravings, Triggers and side effects        Patient counseling completed today:  Discussed mechanism of action, potential risks/benefits/side effects of medications and other recommendations above.      Recommended avoiding concurrent use of alcohol, benzodiazepines or other sedatives with buprenorphine or  opioids.      Discussed importance of avoiding isolation, building a network of supportive relationships, avoiding people/places/things associated with past use to reduce risk of relapse; including motivational interviewing regarding psychosocial treatment for addiction.     Addiction Services expectations were reviewed and a copy was provided to patient at the completion of today's visit.  The expectations  addressed include; routine urine drug screens, frequency of office visits, cancellations/no-shows, and clinic contact information.  The patient was notified that our clinic has 72 business hours to complete any forms and a minimum of 24 business hours to address any medication refill requests.  Questions regarding these expectations were answered and the patient verbalizes an understanding and acceptance of the above expectations.     Counseled the patient on the importance of having a recovery program in addition to medication assisted treatment (MAT).  Components of a recovery program include having some type of sober network, avoiding isolating, willingness to change, avoiding triggers, and managing cravings.    Recommended to abstain from alcohol, benzodiazepines, THC, opioids, and other drugs of abuse.  Increased risk of relapse for opioids with use of these substances was discussed.         RTC:  8/10/22      Kortney Ellison DNP,MSN, AGNP-C  [facility]   [address] , [address]   [place] [address]   Phone # [phone]

## 2022-07-27 ENCOUNTER — HOSPITAL ENCOUNTER (OUTPATIENT)
Facility: HOSPITAL | Age: 57
Setting detail: OBSERVATION
Discharge: HOME | End: 2022-08-08
Attending: EMERGENCY MEDICINE | Admitting: EMERGENCY MEDICINE
Payer: COMMERCIAL

## 2022-07-27 ENCOUNTER — APPOINTMENT (OUTPATIENT)
Dept: RADIOLOGY | Facility: HOSPITAL | Age: 57
End: 2022-07-27
Attending: EMERGENCY MEDICINE
Payer: COMMERCIAL

## 2022-07-27 DIAGNOSIS — M16.11 PRIMARY OSTEOARTHRITIS OF RIGHT HIP: ICD-10-CM

## 2022-07-27 DIAGNOSIS — E55.9 HYPOVITAMINOSIS D: ICD-10-CM

## 2022-07-27 DIAGNOSIS — M25.551 HIP PAIN, RIGHT: ICD-10-CM

## 2022-07-27 DIAGNOSIS — I10 PRIMARY HYPERTENSION: ICD-10-CM

## 2022-07-27 DIAGNOSIS — F32.A DEPRESSION, UNSPECIFIED DEPRESSION TYPE: Primary | ICD-10-CM

## 2022-07-27 LAB
ANION GAP SERPL CALCULATED.3IONS-SCNC: 12 MMOL/L (ref 5–18)
BUN SERPL-MCNC: 21 MG/DL (ref 8–22)
CALCIUM SERPL-MCNC: 10.1 MG/DL (ref 8.5–10.5)
CHLORIDE BLD-SCNC: 105 MMOL/L (ref 98–107)
CO2 SERPL-SCNC: 22 MMOL/L (ref 22–31)
CREAT SERPL-MCNC: 0.95 MG/DL (ref 0.6–1.1)
ERYTHROCYTE [DISTWIDTH] IN BLOOD BY AUTOMATED COUNT: 13.3 % (ref 10–15)
GFR SERPL CREATININE-BSD FRML MDRD: 69 ML/MIN/1.73M2
GLUCOSE BLD-MCNC: 96 MG/DL (ref 70–125)
HCT VFR BLD AUTO: 40 % (ref 35–47)
HGB BLD-MCNC: 13.2 G/DL (ref 11.7–15.7)
MCH RBC QN AUTO: 36.2 PG (ref 26.5–33)
MCHC RBC AUTO-ENTMCNC: 33 G/DL (ref 31.5–36.5)
MCV RBC AUTO: 110 FL (ref 78–100)
PLATELET # BLD AUTO: 321 10E3/UL (ref 150–450)
POTASSIUM BLD-SCNC: 4.4 MMOL/L (ref 3.5–5)
RBC # BLD AUTO: 3.65 10E6/UL (ref 3.8–5.2)
SARS-COV-2 RNA RESP QL NAA+PROBE: NEGATIVE
SODIUM SERPL-SCNC: 139 MMOL/L (ref 136–145)
WBC # BLD AUTO: 10.9 10E3/UL (ref 4–11)

## 2022-07-27 PROCEDURE — 99220 PR INITIAL OBSERVATION CARE,LEVEL III: CPT | Performed by: INTERNAL MEDICINE

## 2022-07-27 PROCEDURE — 96374 THER/PROPH/DIAG INJ IV PUSH: CPT | Mod: XU

## 2022-07-27 PROCEDURE — 85027 COMPLETE CBC AUTOMATED: CPT | Performed by: EMERGENCY MEDICINE

## 2022-07-27 PROCEDURE — 96360 HYDRATION IV INFUSION INIT: CPT | Mod: XU

## 2022-07-27 PROCEDURE — G0378 HOSPITAL OBSERVATION PER HR: HCPCS

## 2022-07-27 PROCEDURE — 250N000011 HC RX IP 250 OP 636: Performed by: EMERGENCY MEDICINE

## 2022-07-27 PROCEDURE — 99285 EMERGENCY DEPT VISIT HI MDM: CPT | Mod: 25

## 2022-07-27 PROCEDURE — 36415 COLL VENOUS BLD VENIPUNCTURE: CPT | Performed by: EMERGENCY MEDICINE

## 2022-07-27 PROCEDURE — 73502 X-RAY EXAM HIP UNI 2-3 VIEWS: CPT

## 2022-07-27 PROCEDURE — 80048 BASIC METABOLIC PNL TOTAL CA: CPT | Performed by: EMERGENCY MEDICINE

## 2022-07-27 PROCEDURE — 87635 SARS-COV-2 COVID-19 AMP PRB: CPT | Performed by: EMERGENCY MEDICINE

## 2022-07-27 RX ORDER — MULTIVITAMIN,THERAPEUTIC
1 TABLET ORAL DAILY
Status: DISCONTINUED | OUTPATIENT
Start: 2022-07-28 | End: 2022-08-08 | Stop reason: HOSPADM

## 2022-07-27 RX ORDER — FOLIC ACID 1 MG/1
1 TABLET ORAL DAILY
Status: DISCONTINUED | OUTPATIENT
Start: 2022-07-28 | End: 2022-08-08 | Stop reason: HOSPADM

## 2022-07-27 RX ORDER — ACETAMINOPHEN 325 MG/1
975 TABLET ORAL EVERY 8 HOURS
Status: DISCONTINUED | OUTPATIENT
Start: 2022-07-28 | End: 2022-07-28

## 2022-07-27 RX ORDER — HYDRALAZINE HYDROCHLORIDE 20 MG/ML
10 INJECTION INTRAMUSCULAR; INTRAVENOUS EVERY 4 HOURS PRN
Status: DISCONTINUED | OUTPATIENT
Start: 2022-07-27 | End: 2022-08-08 | Stop reason: HOSPADM

## 2022-07-27 RX ORDER — ACETAMINOPHEN 650 MG/1
650 SUPPOSITORY RECTAL EVERY 6 HOURS PRN
Status: DISCONTINUED | OUTPATIENT
Start: 2022-07-27 | End: 2022-07-28

## 2022-07-27 RX ORDER — ALBUTEROL SULFATE 90 UG/1
2 AEROSOL, METERED RESPIRATORY (INHALATION) EVERY 4 HOURS PRN
Status: DISCONTINUED | OUTPATIENT
Start: 2022-07-27 | End: 2022-08-08 | Stop reason: HOSPADM

## 2022-07-27 RX ORDER — ACETAMINOPHEN 325 MG/1
650 TABLET ORAL EVERY 6 HOURS PRN
Status: DISCONTINUED | OUTPATIENT
Start: 2022-07-27 | End: 2022-07-28

## 2022-07-27 RX ORDER — ERGOCALCIFEROL 1.25 MG/1
50000 CAPSULE, LIQUID FILLED ORAL
Status: DISCONTINUED | OUTPATIENT
Start: 2022-07-28 | End: 2022-08-08 | Stop reason: HOSPADM

## 2022-07-27 RX ORDER — LANOLIN ALCOHOL/MO/W.PET/CERES
3 CREAM (GRAM) TOPICAL
Status: DISCONTINUED | OUTPATIENT
Start: 2022-07-27 | End: 2022-08-08 | Stop reason: HOSPADM

## 2022-07-27 RX ORDER — MORPHINE SULFATE 4 MG/ML
4 INJECTION, SOLUTION INTRAMUSCULAR; INTRAVENOUS ONCE
Status: COMPLETED | OUTPATIENT
Start: 2022-07-27 | End: 2022-07-27

## 2022-07-27 RX ORDER — AMLODIPINE BESYLATE 5 MG/1
5 TABLET ORAL DAILY
Status: DISCONTINUED | OUTPATIENT
Start: 2022-07-28 | End: 2022-08-08 | Stop reason: HOSPADM

## 2022-07-27 RX ORDER — ONDANSETRON 4 MG/1
4 TABLET, ORALLY DISINTEGRATING ORAL EVERY 6 HOURS PRN
Status: DISCONTINUED | OUTPATIENT
Start: 2022-07-27 | End: 2022-08-08 | Stop reason: HOSPADM

## 2022-07-27 RX ORDER — MULTIVITAMIN,THERAPEUTIC
1 TABLET ORAL DAILY
COMMUNITY

## 2022-07-27 RX ORDER — ONDANSETRON 2 MG/ML
4 INJECTION INTRAMUSCULAR; INTRAVENOUS EVERY 6 HOURS PRN
Status: DISCONTINUED | OUTPATIENT
Start: 2022-07-27 | End: 2022-07-28

## 2022-07-27 RX ORDER — CELECOXIB 200 MG/1
200 CAPSULE ORAL 2 TIMES DAILY
Status: DISCONTINUED | OUTPATIENT
Start: 2022-07-28 | End: 2022-07-27

## 2022-07-27 RX ORDER — PANTOPRAZOLE SODIUM 40 MG/1
40 TABLET, DELAYED RELEASE ORAL DAILY
Status: DISCONTINUED | OUTPATIENT
Start: 2022-07-28 | End: 2022-08-08 | Stop reason: HOSPADM

## 2022-07-27 RX ORDER — FLUTICASONE PROPIONATE 50 MCG
2 SPRAY, SUSPENSION (ML) NASAL DAILY PRN
Status: DISCONTINUED | OUTPATIENT
Start: 2022-07-27 | End: 2022-08-08 | Stop reason: HOSPADM

## 2022-07-27 RX ORDER — PROCHLORPERAZINE MALEATE 10 MG
10 TABLET ORAL EVERY 6 HOURS PRN
Status: DISCONTINUED | OUTPATIENT
Start: 2022-07-27 | End: 2022-08-08 | Stop reason: HOSPADM

## 2022-07-27 RX ORDER — LORATADINE 10 MG/1
10 TABLET ORAL DAILY PRN
Status: DISCONTINUED | OUTPATIENT
Start: 2022-07-27 | End: 2022-08-08 | Stop reason: HOSPADM

## 2022-07-27 RX ORDER — OXYCODONE HYDROCHLORIDE 5 MG/1
5-10 TABLET ORAL EVERY 4 HOURS PRN
Status: DISCONTINUED | OUTPATIENT
Start: 2022-07-27 | End: 2022-07-28

## 2022-07-27 RX ORDER — DULOXETIN HYDROCHLORIDE 60 MG/1
120 CAPSULE, DELAYED RELEASE ORAL AT BEDTIME
Status: DISCONTINUED | OUTPATIENT
Start: 2022-07-28 | End: 2022-07-31

## 2022-07-27 RX ORDER — CELECOXIB 200 MG/1
200 CAPSULE ORAL 2 TIMES DAILY
COMMUNITY
Start: 2022-07-26 | End: 2022-08-08

## 2022-07-27 RX ORDER — CALCIUM CARBONATE 500 MG/1
1000 TABLET, CHEWABLE ORAL 4 TIMES DAILY PRN
Status: DISCONTINUED | OUTPATIENT
Start: 2022-07-27 | End: 2022-08-08 | Stop reason: HOSPADM

## 2022-07-27 RX ORDER — PROCHLORPERAZINE 25 MG
25 SUPPOSITORY, RECTAL RECTAL EVERY 12 HOURS PRN
Status: DISCONTINUED | OUTPATIENT
Start: 2022-07-27 | End: 2022-08-08 | Stop reason: HOSPADM

## 2022-07-27 RX ORDER — GABAPENTIN 300 MG/1
900 CAPSULE ORAL EVERY 8 HOURS
Status: DISCONTINUED | OUTPATIENT
Start: 2022-07-28 | End: 2022-07-28

## 2022-07-27 RX ADMIN — MORPHINE SULFATE 4 MG: 4 INJECTION INTRAVENOUS at 22:27

## 2022-07-28 ENCOUNTER — APPOINTMENT (OUTPATIENT)
Dept: PHYSICAL THERAPY | Facility: HOSPITAL | Age: 57
End: 2022-07-28
Attending: INTERNAL MEDICINE
Payer: COMMERCIAL

## 2022-07-28 ENCOUNTER — APPOINTMENT (OUTPATIENT)
Dept: OCCUPATIONAL THERAPY | Facility: HOSPITAL | Age: 57
End: 2022-07-28
Attending: INTERNAL MEDICINE
Payer: COMMERCIAL

## 2022-07-28 PROCEDURE — 97166 OT EVAL MOD COMPLEX 45 MIN: CPT | Mod: GO

## 2022-07-28 PROCEDURE — 97535 SELF CARE MNGMENT TRAINING: CPT | Mod: GO

## 2022-07-28 PROCEDURE — G0378 HOSPITAL OBSERVATION PER HR: HCPCS

## 2022-07-28 PROCEDURE — 99225 PR SUBSEQUENT OBSERVATION CARE,LEVEL II: CPT | Performed by: INTERNAL MEDICINE

## 2022-07-28 PROCEDURE — 97161 PT EVAL LOW COMPLEX 20 MIN: CPT | Mod: GP

## 2022-07-28 PROCEDURE — 250N000013 HC RX MED GY IP 250 OP 250 PS 637: Performed by: PAIN MEDICINE

## 2022-07-28 PROCEDURE — 99219 PR INITIAL OBSERVATION CARE,LEVEL II: CPT | Performed by: PAIN MEDICINE

## 2022-07-28 PROCEDURE — 250N000011 HC RX IP 250 OP 636: Performed by: INTERNAL MEDICINE

## 2022-07-28 PROCEDURE — 250N000013 HC RX MED GY IP 250 OP 250 PS 637: Performed by: INTERNAL MEDICINE

## 2022-07-28 RX ORDER — ACETAMINOPHEN 325 MG/1
650 TABLET ORAL 2 TIMES DAILY
Status: DISCONTINUED | OUTPATIENT
Start: 2022-07-28 | End: 2022-07-29

## 2022-07-28 RX ORDER — LIDOCAINE 4 G/G
3 PATCH TOPICAL
Status: DISCONTINUED | OUTPATIENT
Start: 2022-07-28 | End: 2022-08-05 | Stop reason: CLARIF

## 2022-07-28 RX ORDER — GABAPENTIN 300 MG/1
1200 CAPSULE ORAL EVERY 8 HOURS
Status: DISCONTINUED | OUTPATIENT
Start: 2022-07-28 | End: 2022-08-08 | Stop reason: HOSPADM

## 2022-07-28 RX ORDER — OXYCODONE HYDROCHLORIDE 5 MG/1
10 TABLET ORAL EVERY 12 HOURS PRN
Status: DISCONTINUED | OUTPATIENT
Start: 2022-07-28 | End: 2022-07-29

## 2022-07-28 RX ADMIN — ONDANSETRON 4 MG: 4 TABLET, ORALLY DISINTEGRATING ORAL at 02:26

## 2022-07-28 RX ADMIN — PANTOPRAZOLE SODIUM 40 MG: 40 TABLET, DELAYED RELEASE ORAL at 08:44

## 2022-07-28 RX ADMIN — AMLODIPINE BESYLATE 5 MG: 5 TABLET ORAL at 00:20

## 2022-07-28 RX ADMIN — ACETAMINOPHEN 975 MG: 325 TABLET ORAL at 08:43

## 2022-07-28 RX ADMIN — OXYCODONE HYDROCHLORIDE 10 MG: 5 TABLET ORAL at 06:27

## 2022-07-28 RX ADMIN — GABAPENTIN 900 MG: 300 CAPSULE ORAL at 08:44

## 2022-07-28 RX ADMIN — ERGOCALCIFEROL 50000 UNITS: 1.25 CAPSULE, LIQUID FILLED ORAL at 08:44

## 2022-07-28 RX ADMIN — GABAPENTIN 900 MG: 300 CAPSULE ORAL at 00:20

## 2022-07-28 RX ADMIN — LIDOCAINE 3 PATCH: 246 PATCH TOPICAL at 11:57

## 2022-07-28 RX ADMIN — OXYCODONE HYDROCHLORIDE 10 MG: 5 TABLET ORAL at 00:21

## 2022-07-28 RX ADMIN — OXYCODONE HYDROCHLORIDE 10 MG: 5 TABLET ORAL at 22:54

## 2022-07-28 RX ADMIN — ACETAMINOPHEN 650 MG: 325 TABLET ORAL at 20:06

## 2022-07-28 RX ADMIN — DULOXETINE HYDROCHLORIDE 120 MG: 60 CAPSULE, DELAYED RELEASE PELLETS ORAL at 00:19

## 2022-07-28 RX ADMIN — GABAPENTIN 1200 MG: 300 CAPSULE ORAL at 15:36

## 2022-07-28 RX ADMIN — THIAMINE HCL TAB 100 MG 100 MG: 100 TAB at 08:44

## 2022-07-28 RX ADMIN — ACETAMINOPHEN 975 MG: 325 TABLET ORAL at 00:21

## 2022-07-28 RX ADMIN — THERA TABS 1 TABLET: TAB at 08:44

## 2022-07-28 RX ADMIN — FOLIC ACID 1 MG: 1 TABLET ORAL at 08:44

## 2022-07-28 RX ADMIN — DULOXETINE HYDROCHLORIDE 120 MG: 60 CAPSULE, DELAYED RELEASE PELLETS ORAL at 22:02

## 2022-07-28 RX ADMIN — AMLODIPINE BESYLATE 5 MG: 5 TABLET ORAL at 08:43

## 2022-07-28 ASSESSMENT — ACTIVITIES OF DAILY LIVING (ADL): DEPENDENT_IADLS:: INDEPENDENT

## 2022-07-28 NOTE — PLAN OF CARE
Alert and oriented x4. Patient complains of right hip pain. Sinus rhythm. On 1 L NC.     Goal Outcome Evaluation:        Problem: Plan of Care - These are the overarching goals to be used throughout the patient stay.    Goal: Optimal Comfort and Wellbeing  Outcome: Ongoing, Progressing  Intervention: Monitor Pain and Promote Comfort  Recent Flowsheet Documentation  Taken 7/28/2022 1200 by Kathy Bah RN  Pain Management Interventions: medication (see MAR)  Taken 7/28/2022 0800 by Kathy Bah RN  Pain Management Interventions: medication (see MAR)  Intervention: Provide Person-Centered Care  Recent Flowsheet Documentation  Taken 7/28/2022 1200 by Kathy Bah RN  Trust Relationship/Rapport:   care explained   emotional support provided   empathic listening provided   thoughts/feelings acknowledged  Taken 7/28/2022 0800 by Kathy Bah RN  Trust Relationship/Rapport:   care explained   emotional support provided   empathic listening provided   thoughts/feelings acknowledged     Problem: Plan of Care - These are the overarching goals to be used throughout the patient stay.    Goal: Optimal Comfort and Wellbeing  Intervention: Provide Person-Centered Care  Recent Flowsheet Documentation  Taken 7/28/2022 1200 by Kathy Bah RN  Trust Relationship/Rapport:   care explained   emotional support provided   empathic listening provided   thoughts/feelings acknowledged  Taken 7/28/2022 0800 by Kathy Bah RN  Trust Relationship/Rapport:   care explained   emotional support provided   empathic listening provided   thoughts/feelings acknowledged

## 2022-07-28 NOTE — CONSULTS
ORTHOPEDIC CONSULTATION    Consultation  Rachel De Jesus,  1965, MRN [id number]    Hip pain, right [M25.551]    PCP: Kika Leblanc, [phone]   Code status:  Full Code       Extended Emergency Contact Information  Primary Emergency Contact: PENNY MCCULLOUGH  Home Phone: [phone]  Relation: Son  Secondary Emergency Contact: JARVIS MARTIN  Home Phone: [phone]  Relation: Daughter         IMPRESSION:  Chronic right hip pain, severe osteoarthritis     PLAN:  This patient was discussed with Dr. Anderson and Dr. Montiel, on-call surgeon for [facility] and they are in agreement with the following plan.  Patient was evaluated for chronic right hip pain.  Patient mechanical fall on Saturday (2022).  Patient has planned R PETRA with [facility], however postponed given HTN with pending new surgical date.  On exam patient is neurovascular intact.  X-rays revealed severe osteoarthritis of the right hip with a shallow acetabulum and flattening femoral head without fracture or dislocation.  No acute surgical intervention.  Conservative treatment.  Pain management.  Recommendations follow:    -No acute surgical intervention  -Conservative treatment  -WBAT RLE  -PT/OT  -Pain management per hospital team  -Recommend outpatient follow-up with [facility]      Thank you for including [facility] in the care of Rachel De Jesus. It has been a pleasure participating in her care.        CHIEF COMPLAINT: Hip pain, right    HISTORY OF PRESENT ILLNESS:  The patient is seen in orthopedic consultation at the request of Dr. Burton.  The patient is a 56 year old female with chronic moderate pain of the right  hip, localized to the groin.  Patient has a history of back pain, fibromyalgia, HTN, asthma, etc. Patient had a planned right total hip arthroplasty with [facility] , however postponed due to high blood pressure and started on blood pressure medications.   Patient is uncertain of new  surgery date with [facility].  Patient reports since R PETRA was postponed patient returned to drinking.The patient reports that right hip pain has progressively gotten worse and fallen at least 6 times this year.  Most recent fall was Saturday 07/23/22, patient reports while at home with a friend and her daughter, as she was trying to get up from her chair and walking to grab a coffee without her walker, she tripped on a ledge and fell. Friends daughter had to help patient up.  Patient reports she has not smoke or drink alcohol for about 28 days since she fell about a month ago and fractured her neck and LEFT wrist. They report sharp shooting pain from right hip down to the foot.  The severity of the right hip pain has caused difficulty with ambulation and daily activities. The patient reports that their pain is alleviated by rest and is exacerbated by movement and pressure.  Patient uses walker at baseline.      ALLERGIES:   Review of patient's allergies indicates   Allergies   Allergen Reactions     Gabapentin Nausea and Vomiting     Pt does not believe this to a true allergy           MEDICATIONS UPON ADMISSION:  Medications were reviewed.  They include:   (Not in a hospital admission)        SOCIAL HISTORY:   she  reports that she quit smoking about 12 years ago. Her smoking use included cigarettes. She has never used smokeless tobacco. She reports current alcohol use of about 3.3 - 4.2 standard drinks of alcohol per week.      FAMILY HISTORY:  family history includes Breast Cancer in her mother. She was adopted.      REVIEW OF SYSTEMS:   Reviewed with patient. See HPI, otherwise negative       PHYSICAL EXAMINATION:  Vitals: Temp:  [97.5  F (36.4  C)-99.7  F (37.6  C)] 97.5  F (36.4  C)  Pulse:  [76-96] 93  BP: (125-185)/(65-96) 126/76  SpO2:  [88 %-98 %] 93 %  General: On examination, the patient is awake and alert and oriented to general circumstances   SKIN: There is no evidence of ecchymosis, warmth,  erythema, edema, deformity, break to the skin, open wound.  Pulses:  dorsalis pedis pulse is intact and equal bilaterally  Sensation: intact and equal bilaterally to the distal lower extremities.  Tenderness: NTTP femur, knee, tibia, fibula, ankle joint, foot.  ROM: Limited hip range of motion due to pain.  AROM knee 0 to 45 degrees with pain.  Contralateral side= Full range of motion, Negative joint instability findings, 5/5 motor groups about the joint, Non-tender.       RADIOGRAPHIC EVALUATION:  Personally reviewed    Narrative & Impression   EXAM: XR PELVIS AND HIP RIGHT 2 VIEWS  LOCATION: [facility]  DATE/TIME: 7/27/2022 9:23 PM     INDICATION: Right hip pain.  COMPARISON: None.                                                                      IMPRESSION:      Acetabular dysplasia/hypoplasia, with a shallow acetabulum and a chronically flattened femoral head, with additional severe degenerative arthritic changes in the right hip, including complete joint space loss with subchondral sclerosis and cystic change   on both sides of the joint. No acute fracture. No overt dislocation.     Uncomplicated left total hip arthroplasty.            PERTINENT LABS:  Lab Results: personally reviewed.   Lab Results   Component Value Date     07/27/2022    CO2 22 07/27/2022    BUN 21 07/27/2022     Lab Results   Component Value Date    WBC 10.9 07/27/2022    HGB 13.2 07/27/2022    HCT 40.0 07/27/2022     07/27/2022     07/27/2022         Jackie Tucker PA-C, [facility]  Date: 7/28/2022  Time: 12:41 PM    CC1:   Luis Fernando Anderson MD    CC2:   Kika Leblanc

## 2022-07-28 NOTE — PROGRESS NOTES
[facility]    Medicine Progress Note - Hospitalist Service    Date of Admission:  7/27/2022    Assessment & Plan                    Diet: Regular Diet Adult    DVT Prophylaxis: Pneumatic Compression Devices  Valdez Catheter: Not present  Central Lines: None  Cardiac Monitoring: None  Code Status: Full Code      Disposition Plan   { TIP  Expected discharge date has passed or is blank! Click here to update expected discharge date and refresh note (tipsheet)     :[id number]}      The patient's care was discussed with the Attending Physician, Dr. Anderson.    Luis Fernando Anderson MD  Hospitalist Service  [facility]  Securely message with the Vocera Web Console (learn more here)  Text page via [employer] Paging/Directory         Clinically Significant Risk Factors Present on Admission                          ______________________________________________________________________    Interval History   {Include any events overnight / new information. 4 point ROS is needed for billing a level III     Data reviewed today: I reviewed all medications, new labs and imaging results over the last 24 hours. I personally reviewed no images or EKG's today.    Physical Exam   Vital Signs: Temp: 98.9  F (37.2  C) Temp src: Oral BP: 132/75 Pulse: 80   Resp: 22 SpO2: 97 % O2 Device: Nasal cannula Oxygen Delivery: 1 LPM  Weight: 140 lbs 0 oz  General Appearance: Alert ,not in pain except rt hip from severe oa  Respiratory: clear  Cardiovascular: RRR  GI: *soft  Skin:no rash  D/w PA for ortho ,valera ee their recommendation in epic     Data   Recent Labs   Lab 07/27/22  2145   WBC 10.9   HGB 13.2   *         POTASSIUM 4.4   CHLORIDE 105   CO2 22   BUN 21   CR 0.95   ANIONGAP 12   WILSON 10.1   GLC 96     56-year-old female with history of anxiety/depression, mood disorder, chronic low back pain, fibromyalgia, hypertension, hepatic steatosis, asthma, prior C3 transverse process fracture  after fall and reflux esophagitis with benign-appearing esophageal stricture, nonbleeding duodenal ulcer and known hip OA presents with right hip pain.           Assessment:     Principal Problem:    Hip pain, right  Active Problems:    Cervicalgia    Asthma    Hepatic steatosis    Chronic low back pain    Depression    Fibromyalgia    HTN (hypertension)    Obesity    Alcohol use disorder, severe, dependence (H)   Right hip pain: X-ray pelvis and hip shows severe degenerative arthritic changes in the right hip, including complete joint space loss with subchondral sclerosis and cystic change   on both sides of the joint.  Patient states she was supposed to have hip replacement but this was postponed due to hypertension  -- Consult pain team  -- Consult orthopedic surgery for possible surgical intervention this hospitalization  -- Continue home gabapentin, start scheduled Tylenol, have as needed oxycodone available overnight        Hypertension: Not on any chronic medications  --Start scheduled amlodipine 5 mg daily  -- Escalate antihypertensive needs pending clinical course  -- Have IV hydralazine as needed available        Recent history of GI bleed: EGD 6/29/2022 showed reflux esophagitis with nonbleeding duodenal ulcer and benign-appearing esophageal stricture  -- Avoid aspirin or NSAIDs  -- Continue PPI  -- Hold home Celebrex (patient is not taking)        Evidence of vitamin D deficiency: Vitamin D level recently checked as outpatient and is low  --Start vitamin D replacement 50,000 units weekly for now         Recent fall with right C3 transverse process fracture:    Possibly sustained when she had syncope on 6/26/2022 versus relative hypotension from acute blood loss.   --Neurosurgery team was consulted on last admission; recommended cervical collar at all times  -- Pain management consultation   -- On last hospitalization patient was deemed not to be a candidate for outpatient opoid given heavy alcohol use as  per pain management service however patient is now 28 days sober   plan cervical collar ,   Hand ,arm in cast   Being seen now by ortho for hip osteoarthritis

## 2022-07-28 NOTE — ED PROVIDER NOTES
EMERGENCY DEPARTMENT ENCOUnter      NAME: Rachel De Jesus  AGE: 56 year old female  YOB: 1965  MRN: [id number]  EVALUATION DATE & TIME: 2022  8:38 PM    PCP: Kika Leblanc    ED PROVIDER: Donte Burton DO      Chief Complaint   Patient presents with     Hip Pain         FINAL IMPRESSION:  1. Hip pain, right          ED COURSE & MEDICAL DECISION MAKIN:50 PM I met with patient for initial encounter in [facility]-.  10:06 PM I spoke with Dr Anderson with [facility] regarding patient.  10:26 PM I spoke to the hospitalist Dr Rubio regarding patient.      The patient presented to the emergency department today with complaints of right hip pain.  She states that she has been told that she needs a hip replacement for quite some time.  She has had several falls recently due to her hip pain but has not directly injured her hip recently.  She has pain on exam with any movement of the right hip.  X-ray tonight does not reveal any evidence of fracture but there are significant degenerative changes.  Given how much difficulty she is having ambulating, I feel it is best to keep her overnight in the hospital.  I did discuss her case with orthopedics and they will plan to see her in the morning.  The patient is comfortable with the plan for admission.      At the conclusion of the encounter I discussed the results of all of the tests and the disposition. The questions were answered. The patient or family acknowledged understanding and was agreeable with the care plan.         =================================================================    HPI        Rachel De Jesus is a 56 year old female with a pertinent history of alcohol dependence, ARJUN, HTN who presents to this ED via EMS for evaluation of hip pain.    Patient endorses worsening right hip pain since Saturday when she fell and broke a finger on her left hand. The pain is worse with movement and she says she can't put any weight  on it. Also endorses a slight fever. Patient also fell 1 month ago and fractured her neck. She says she quit drinking and smoking. Patient reports she had a hip replacement scheduled but it was postponed due to her HTN. She had imaging of her hip done in February. Patient says she has had surgeries on her lower back and has arthritis in that area.      REVIEW OF SYSTEMS     Constitutional:  Denies fever or chills  HENT:  Denies sore throat   Respiratory:  Denies cough or shortness of breath   Cardiovascular:  Denies chest pain or palpitations  GI:  Denies abdominal pain, nausea, or vomiting  Musculoskeletal:  Denies any new extremity pain. Positive for right hip pain.  Skin:  Denies rash   Neurologic:  Denies headache, focal weakness or sensory changes    All other systems reviewed and are negative      PAST MEDICAL HISTORY:  Past Medical History:   Diagnosis Date     Alcohol use disorder, severe, dependence (H) 7/1/2022     Asthma      Chronic low back pain      Depression      Fibromyalgia      Hepatic steatosis      HTN (hypertension)      MRSA infection      Obesity        PAST SURGICAL HISTORY:  Past Surgical History:   Procedure Laterality Date     ESOPHAGOSCOPY, GASTROSCOPY, DUODENOSCOPY (EGD), COMBINED N/A 6/29/2022    Procedure: ESOPHAGOGASTRODUODENOSCOPY (EGD);  Surgeon: Nils Jaeger MD;  Location: [facility]     EXCISE BREAST CYST/FIBROADENOMA/TUMOR/DUCT LESION/NIPPLE LESION/AREOLAR LESION      Description: Breast Surgery Lumpectomy;  Recorded: 07/09/2014;     HC REMOVAL GALLBLADDER      Description: Cholecystectomy;  Recorded: 07/09/2014;     [facility] REMOVAL OF TONSILS,<13 Y/O      Description: Tonsillectomy;  Recorded: 07/09/2014;     [facility] CERV SPINE FUSN,ANTER,BELOW C2      Description: Cervical Vertebral Fusion;  Recorded: 07/09/2014;     [facility] GARY W/O FACETEC FORAMOT/DSKC 1/2 VRT SEG, CERVICAL      Description: Laminectomy Lumbar;  Recorded: 07/09/2014;     [facility] TOTAL ABDOM HYSTERECTOMY       Description: Hysterectomy;  Recorded: 2014;           CURRENT MEDICATIONS:    albuterol (PROAIR HFA/PROVENTIL HFA/VENTOLIN HFA) 108 (90 Base) MCG/ACT inhaler  celecoxib (CELEBREX) 200 MG capsule  DULoxetine (CYMBALTA) 60 MG capsule  fluticasone (FLONASE) 50 MCG/ACT nasal spray  folic acid (FOLVITE) 1 MG tablet  gabapentin (NEURONTIN) 300 MG capsule  ketotifen (ZADITOR) 0.025 % ophthalmic solution  loratadine (CLARITIN) 10 MG tablet  multivitamin, therapeutic (THERA-VIT) TABS tablet  ondansetron (ZOFRAN ODT) 4 MG ODT tab  pantoprazole (PROTONIX) 40 MG EC tablet  thiamine (B-1) 100 MG tablet        ALLERGIES:  Allergies   Allergen Reactions     Gabapentin Nausea and Vomiting     Pt does not believe this to a true allergy         FAMILY HISTORY:  Family History   Adopted: Yes   Problem Relation Age of Onset     Breast Cancer Mother        SOCIAL HISTORY:   Social History     Socioeconomic History     Marital status:      Spouse name: None     Number of children: None     Years of education: None     Highest education level: None   Tobacco Use     Smoking status: Former Smoker     Types: Cigarettes     Quit date: 2009     Years since quittin.8     Smokeless tobacco: Never Used   Substance and Sexual Activity     Alcohol use: Yes     Alcohol/week: 3.3 - 4.2 standard drinks     Types: 4 - 5 drink(s) per week     Social Determinants of Health     Financial Resource Strain: Low Risk      Difficulty of Paying Living Expenses: Not hard at all   Food Insecurity: No Food Insecurity     Worried About Running Out of Food in the Last Year: Never true     Ran Out of Food in the Last Year: Never true   Transportation Needs: No Transportation Needs     Lack of Transportation (Medical): No     Lack of Transportation (Non-Medical): No   Physical Activity: Inactive     Days of Exercise per Week: 0 days     Minutes of Exercise per Session: 0 min   Stress: No Stress Concern Present     Feeling of Stress : Only a  "little   Social Connections: Socially Isolated     Frequency of Communication with Friends and Family: Once a week     Frequency of Social Gatherings with Friends and Family: Once a week     Attends Restorationist Services: Never     Active Member of Clubs or Organizations: No     Attends Club or Organization Meetings: Never     Marital Status: Never    Intimate Partner Violence: Not At Risk     Fear of Current or Ex-Partner: No     Emotionally Abused: No     Physically Abused: No     Sexually Abused: No   Housing Stability: Low Risk      Unable to Pay for Housing in the Last Year: No     Number of Places Lived in the Last Year: 1     Unstable Housing in the Last Year: No       VITALS:  Patient Vitals for the past 24 hrs:   BP Temp Temp src Pulse SpO2 Height Weight   07/28/22 0000 (!) 156/81 -- -- 95 96 % -- --   07/27/22 2345 (!) 155/78 -- -- 87 94 % -- --   07/27/22 2315 (!) 158/84 -- -- 91 94 % -- --   07/27/22 2300 (!) 159/78 -- -- 94 93 % -- --   07/27/22 2245 (!) 157/88 -- -- 94 94 % -- --   07/27/22 2200 (!) 162/70 -- -- 91 96 % -- --   07/27/22 2115 -- -- -- 94 95 % -- --   07/27/22 2051 -- -- -- -- -- 1.6 m (5' 3\") 63.5 kg (140 lb)   07/27/22 2042 (!) 185/91 99.7  F (37.6  C) Oral 96 96 % -- --       PHYSICAL EXAM    Constitutional:  Well developed, Well nourished,  HENT:  Normocephalic, Atraumatic, Bilateral external ears normal, Oropharynx moist, Nose normal.   Neck:  Normal range of motion, No meningismus, No stridor.   Eyes:  EOMI, Conjunctiva normal, No discharge.   Respiratory:  Normal breath sounds, No respiratory distress, No wheezing, No chest tenderness.   Cardiovascular:  Normal heart rate, Normal rhythm, No murmurs  GI:  Soft, No tenderness, No guarding, No CVA tenderness.   Musculoskeletal:  No tenderness to palpation or major deformities noted.   Integument:  Warm, Dry, No erythema, No rash.   Neurologic:  Alert & oriented x 3, Normal motor function, Normal sensory function, No focal " deficits noted.   Psychiatric:  Affect normal, Judgment normal, Mood normal.      LAB:  All pertinent labs reviewed and interpreted.  Results for orders placed or performed during the hospital encounter of 07/27/22              CBC with platelets   Result Value Ref Range    WBC Count 10.9 4.0 - 11.0 10e3/uL    RBC Count 3.65 (L) 3.80 - 5.20 10e6/uL    Hemoglobin 13.2 11.7 - 15.7 g/dL    Hematocrit 40.0 35.0 - 47.0 %     (H) 78 - 100 fL    MCH 36.2 (H) 26.5 - 33.0 pg    MCHC 33.0 31.5 - 36.5 g/dL    RDW 13.3 10.0 - 15.0 %    Platelet Count 321 150 - 450 10e3/uL   Basic metabolic panel   Result Value Ref Range    Sodium 139 136 - 145 mmol/L    Potassium 4.4 3.5 - 5.0 mmol/L    Chloride 105 98 - 107 mmol/L    Carbon Dioxide (CO2) 22 22 - 31 mmol/L    Anion Gap 12 5 - 18 mmol/L    Urea Nitrogen 21 8 - 22 mg/dL    Creatinine 0.95 0.60 - 1.10 mg/dL    Calcium 10.1 8.5 - 10.5 mg/dL    Glucose 96 70 - 125 mg/dL    GFR Estimate 69 >60 mL/min/1.73m2   Asymptomatic COVID-19 Virus (Coronavirus) by PCR Nasopharyngeal    Specimen: Nasopharyngeal; Swab   Result Value Ref Range    SARS CoV2 PCR Negative Negative       RADIOLOGY:  I have independently reviewed and interpreted the above imaging, pending the final radiology read.  XR Pelvis and Hip Right 2 Views   Final Result   IMPRESSION:       Acetabular dysplasia/hypoplasia, with a shallow acetabulum and a chronically flattened femoral head, with additional severe degenerative arthritic changes in the right hip, including complete joint space loss with subchondral sclerosis and cystic change    on both sides of the joint. No acute fracture. No overt dislocation.      Uncomplicated left total hip arthroplasty.      Prior fusion at L5-S1. Advanced degenerative disc changes at L4-L5. Mild degenerative changes in the lumbar spinal elsewhere.              I, Liam Torres, am serving as a scribe to document services personally performed by Dr. Burton based on my observation and the  provider's statements to me. I, Donte Burton, DO attest that Liam Torres is acting in a scribe capacity, has observed my performance of the services and has documented them in accordance with my direction.    Donte Burton, DO  Emergency Medicine  [facility] EMERGENCY DEPARTMENT  [address] [place] [id number] [phone]  Dept: [phone]       Donte Burton MD  07/28/22 0059

## 2022-07-28 NOTE — ED TRIAGE NOTES
Patient comes from home via [facility] ambulance. Patient called for ambulance due to right hip pain. Patient fell 1 month ago. Which resulted in this hip pain. Then fell this past Saturday 7/23 at which time she went to urgent care and broke a metacarpal on left hand. Since fall on Saturday, patient is having significantly increasing hip pain at 10/10.

## 2022-07-28 NOTE — CONSULTS
[facility] ACUTE PAIN SERVICE CONSULTATION ([facility], [facility], [facility])     Date of Admission:  7/27/2022  Date of Consult (When I saw the patient): 07/28/22  Physician requesting consult: Dr. Rubio  Reason for consult: Pain     Assessment/Plan:     Rachel De Jesus is a 56 year old female who was admitted on 7/27/2022.   . I was asked to see the patient for hip pain. Admitted for right sided hip pain. History of HTN, EtOH use disorder, recent fractures, frequent falls of fibromyalgia. Pain more than 1 year ago.  The right-sided hip and groin pain has significantly reduced quality of life, and June she tried a steroid injection which was not helpful, she tried physical therapy a year ago, and continues to have severe pain-unable to bear any weight on the right hip, unable to drive, unable to perform self-care.  The x-ray did show degenerative arthritis which is severe and complete joint space loss.    PLAN:   1) severe right-sided hip pain secondary to severe degenerative arthritis.  Given recent sobriety would avoid opioids unless there is a need postoperatively.  Can increase gabapentin and add topicals.  At a maximum would offer oxycodone 1-2 times per day.  Although this does need to be in a controlled setting.  2)Multimodal Medication Therapy  Topical:  Lidocaine patch 4% and Diclofenac ointment  Adjuvants:  Gabapentin Increase to 1200 3 times daily lower dose Tylenol given recent elevation in LFTs given bleeding history would avoid NSAIDs, duloxetine 120 mg at at bedtime  Opioids:  [id number] BID PRN  3)Non-medication interventions- Ice, Heat, physical therapy  4)Constipation Prophylaxis- senna and miralax PRN   5) Follow up -TBD pending orthopedics consult, patient will certainly require TCU         History of Present Illness (HPI):       Rachel De Jesus is a 56 year old old female who presented with ongoing right-sided hip pain.  The pain has been present for 1  year.  It radiates into the right groin.  She has tried ibuprofen, Tylenol, gabapentin.  Very poor quality of life.  Unable to drive, unable to spend time with her grandchildren, unable to walk without walker and barely can bear weight on the right side.  Current pain is 5/10 and this is with complete immobility and recently had dose of oxycodone.  Of note she does suffer from alcohol use disorder and is newly sober.  She did have plans to enter treatment this Friday and has been working with the therapist.  She also quit smoking a month ago and has no desire to utilize alcohol or tobacco..       Discussed multimodal interventions, interventions other than systemic pharmacologic treatments for chronic pain including: behavioral approaches such as: ice and PT     MN -pulled from system on 07/28/22. Last refill on 7/1. This indicated no opioid use. 1 total number of prescribing providers noted.       Medical History   has a past medical history of Alcohol use disorder, severe, dependence (H) (7/1/2022), Asthma, Chronic low back pain, Depression, Fibromyalgia, Hepatic steatosis, HTN (hypertension), MRSA infection, and Obesity.       Surgical History   has a past surgical history that includes GARY W/O FACETEC FORAMOT/DSKC 1/2 VRT SEG, CERVICAL; REMOVAL GALLBLADDER; TOTAL ABDOM HYSTERECTOMY; CERV SPINE FUSN,ANTER,BELOW C2; REMOVAL OF TONSILS,<13 Y/O; Excise breast cyst/fibroadenoma/tumor/duct lesion/nipple lesion/areolar lesion; and Esophagoscopy, gastroscopy, duodenoscopy (EGD), combined (N/A, 6/29/2022).     Allergies     Allergies   Allergen Reactions     Gabapentin Nausea and Vomiting     Pt does not believe this to a true allergy          Current Home Medications   Prior to Admission medications    Medication Sig Start Date End Date Taking? Authorizing Provider   albuterol (PROAIR HFA/PROVENTIL HFA/VENTOLIN HFA) 108 (90 Base) MCG/ACT inhaler Inhale 2 puffs into the lungs every 4 hours as needed 7/29/14  Yes  Reported, Patient   celecoxib (CELEBREX) 200 MG capsule Take 200 mg by mouth 2 times daily 7/26/22  Yes Unknown, Entered By History   DULoxetine (CYMBALTA) 60 MG capsule Take 120 mg by mouth At Bedtime   Yes Unknown, Entered By History   fluticasone (FLONASE) 50 MCG/ACT nasal spray Spray 2 sprays in nostril daily as needed 7/29/14  Yes Reported, Patient   folic acid (FOLVITE) 1 MG tablet Take 1 tablet (1 mg) by mouth daily 7/2/22  Yes Yadira Talamantes MD   gabapentin (NEURONTIN) 300 MG capsule Take 3 capsules (900 mg) by mouth every 8 hours 7/15/22  Yes Kortney Ellison CNP   ketotifen (ZADITOR) 0.025 % ophthalmic solution Place 1 drop into both eyes 2 times daily as needed 4/9/21  Yes Unknown, Entered By History   loratadine (CLARITIN) 10 MG tablet Take 10 mg by mouth daily as needed for allergies   Yes Unknown, Entered By History   multivitamin, therapeutic (THERA-VIT) TABS tablet Take 1 tablet by mouth daily   Yes Unknown, Entered By History   ondansetron (ZOFRAN ODT) 4 MG ODT tab Take 4 mg by mouth every 8 hours as needed for nausea   Yes Unknown, Entered By History   pantoprazole (PROTONIX) 40 MG EC tablet Take 1 tablet (40 mg) by mouth daily 7/1/22  Yes Yadira Talamantes MD   thiamine (B-1) 100 MG tablet Take 1 tablet (100 mg) by mouth daily 7/2/22  Yes Gerry Francois MD   lisinopril (ZESTRIL) 20 MG tablet Take 20 mg by mouth At Bedtime  7/1/22  Unknown, Entered By History          Social History  Reviewed, and she  reports that she quit smoking about 12 years ago. Her smoking use included cigarettes. She has never used smokeless tobacco. She reports current alcohol use of about 3.3 - 4.2 standard drinks of alcohol per week.      Family History- Reviewed care everywhere to find family history- Nothing relevant to pain consult    Reviewed, and family history includes Breast Cancer in her mother. She was adopted.    Review of Systems  Complete ROS reviewed, unless noted  , all other  systems reviewed (with patient) and all others found to be negative.         Objective:     Vitals:  B/P: 126/76, T: 97.5, P: 76, R: Data Unavailable     Weight:   140 lbs 0 oz  Body mass index is 24.8 kg/m .      Physical Exam:     General Appearance:  Alert, cooperative, no distress, appears stated age  Patient is sitting up eating breakfast    Head:  Normocephalic, without obvious abnormality, atraumatic   Eyes:  Pupils are reactive   ENT/Throat: Lips normal    Lymph/Neck: Supple, symmetrical, trachea midline, no adenopathy, thyroid: not enlarged, symmetric    Lungs:   Clear to auscultation bilaterally, respirations unlabored   Chest Wall:  No tenderness or deformity   Cardiovascular/Heart:  Regular rate and rhythm, S1, S2 normal,no murmur, rub or gallop.     Abdomen:   Soft, non-tender, bowel sounds active all four quadrants,  no masses, no organomegaly   Musculoskeletal: Right hip pain   Skin: Skin color tan   Neurologic: Alert and oriented X 3, Moves all 4 extremities           Imaging Reviewed Personally By Myself    Discussed results of x-ray with patient directly.  EXAM: XR PELVIS AND HIP RIGHT 2 VIEWS  LOCATION: [facility]  DATE/TIME: 7/27/2022 9:23 PM     INDICATION: Right hip pain.  COMPARISON: None.                                                                      IMPRESSION:      Acetabular dysplasia/hypoplasia, with a shallow acetabulum and a chronically flattened femoral head, with additional severe degenerative arthritic changes in the right hip, including complete joint space loss with subchondral sclerosis and cystic change   on both sides of the joint. No acute fracture. No overt dislocation.     Uncomplicated left total hip arthroplasty.     Prior fusion at L5-S1. Advanced degenerative disc changes at L4-L5. Mild degenerative changes in the lumbar spinal elsewhere.             Labs Reviewed Personally By Myself    Sodium   Date Value Ref Range Status   07/27/2022 139  136 - 145 mmol/L Final     Potassium   Date Value Ref Range Status   07/27/2022 4.4 3.5 - 5.0 mmol/L Final     Chloride   Date Value Ref Range Status   07/27/2022 105 98 - 107 mmol/L Final     Carbon Dioxide (CO2)   Date Value Ref Range Status   07/27/2022 22 22 - 31 mmol/L Final     Anion Gap   Date Value Ref Range Status   07/27/2022 12 5 - 18 mmol/L Final     Glucose   Date Value Ref Range Status   07/27/2022 96 70 - 125 mg/dL Final     Urea Nitrogen   Date Value Ref Range Status   07/27/2022 21 8 - 22 mg/dL Final     Creatinine   Date Value Ref Range Status   07/27/2022 0.95 0.60 - 1.10 mg/dL Final     GFR Estimate   Date Value Ref Range Status   07/27/2022 69 >60 mL/min/1.73m2 Final     Comment:     Effective December 21, 2021 eGFRcr in adults is calculated using the 2021 CKD-EPI creatinine equation which includes age and gender (Jackson et al., NEJM, DOI: 10.1056/PZJNss2666979)   06/21/2019 >60 >60 mL/min/1.73m2 Final     Calcium   Date Value Ref Range Status   07/27/2022 10.1 8.5 - 10.5 mg/dL Final             Total time spent 65 minutes with greater than 50% in consultation, education and coordination of care.     Also discussed with RN.     Thank you for this consultation.          Blossom Ponce APRN, CNS-BC, CNP, ACHPN  Acute Care Pain Management Program   Hours of pain coverage 7a-1700- after 1700 please call the house officer   [facility] (WW, Joes, JNs)   Page via [facility]- Click HERE to page Blossom or call [phone]

## 2022-07-28 NOTE — H&P
[facility] History and Physical    Rachel De Jesus   [facility] [address]   [place] [facility] [address]  : 1965  Admission Date/Time: 2022  8:38 PM    Primary Care Provider / Ref[facility] Physician[facility] Attending Physician:  Dada Rubio DO     Assessment:     Principal Problem:    Hip pain, right  Active Problems:    Cervicalgia    Asthma    Hepatic steatosis    Chronic low back pain    Depression    Fibromyalgia    HTN (hypertension)    Obesity    Alcohol use disorder, severe, dependence (H)      Plan:    56-year-old female with history of anxiety/depression, mood disorder, chronic low back pain, fibromyalgia, hypertension, hepatic steatosis, asthma, prior C3 transverse process fracture after fall and reflux esophagitis with benign-appearing esophageal stricture, nonbleeding duodenal ulcer and known hip OA presents with right hip pain.       Right hip pain: X-ray pelvis and hip shows severe degenerative arthritic changes in the right hip, including complete joint space loss with subchondral sclerosis and cystic change   on both sides of the joint.  Patient states she was supposed to have hip replacement but this was postponed due to hypertension  -- Consult pain team  -- Consult orthopedic surgery for possible surgical intervention this hospitalization  -- Continue home gabapentin, start scheduled Tylenol, have as needed oxycodone available overnight      Hypertension: Not on any chronic medications  --Start scheduled amlodipine 5 mg daily  -- Escalate antihypertensive needs pending clinical course  -- Have IV hydralazine as needed available      Recent history of GI bleed: EGD 2022 showed reflux esophagitis with nonbleeding duodenal ulcer and benign-appearing esophageal stricture  -- Avoid aspirin or NSAIDs  -- Continue PPI  -- Hold home Celebrex (patient is not taking)      Evidence of vitamin D deficiency: Vitamin D level recently checked as  outpatient and is low  --Start vitamin D replacement 50,000 units weekly for now        Recent fall with right C3 transverse process fracture:    Possibly sustained when she had syncope on 6/26/2022 versus relative hypotension from acute blood loss.   --Neurosurgery team was consulted on last admission; recommended cervical collar at all times  -- Pain management consultation   -- On last hospitalization patient was deemed not to be a candidate for outpatient opoid given heavy alcohol use as per pain management service however patient is now 28 days sober        History of hepatic steatosis: CMP checked prior to admission shows normal LFTs  --Monitor LFTs intermittently while on scheduled Tylenol           Chronic radicular low back pain, fibromyalgia, history of anxiety/depression:  -- Continue home Cymbalta and gabapentin       History of asthma:   -- Continue home albuterol       History of alcohol abuse/waithdrawal. Patient is now 28 days sober and has stopped smoking  --Continue home thiamine and folic acid         DVT PPX: SCD    Code status: Full code        Dada Rubio D.O.          _____________________________________________________________  CHIEF COMPLAINT:    Right hip pain    HPI:  56-year-old female with history of anxiety/depression, mood disorder, chronic low back pain, fibromyalgia, hypertension, hepatic steatosis, asthma, prior C3 transverse process fracture after fall and reflux esophagitis with benign-appearing esophageal stricture, nonbleeding duodenal ulcer and known hip OA presents with right hip pain.   Patient endorses worsening right hip pain over the past several days but started when she fell 1 month ago and was hospitalized with neck fracture.  She states she has quit drinking alcohol for last 28 days  Patient was supposed to have hip replacement scheduled but was postponed due to hypertension.  Review of systems positive for chronic pain and painful active range of motion of the  right hip.  Remainder of ROS negative. Denies any other exacerbating or improving factors.          ALLERGIES/SENSITIVITIES:   Allergies   Allergen Reactions     Gabapentin Nausea and Vomiting     Pt does not believe this to a true allergy         (Not in a hospital admission)      Past Medical History:   Diagnosis Date     Alcohol use disorder, severe, dependence (H) 7/1/2022     Asthma      Chronic low back pain      Depression      Fibromyalgia      Hepatic steatosis      HTN (hypertension)      MRSA infection      Obesity        Past Surgical History:   Procedure Laterality Date     ESOPHAGOSCOPY, GASTROSCOPY, DUODENOSCOPY (EGD), COMBINED N/A 6/29/2022    Procedure: ESOPHAGOGASTRODUODENOSCOPY (EGD);  Surgeon: Nils Jaeger MD;  Location: [facility] OR     EXCISE BREAST CYST/FIBROADENOMA/TUMOR/DUCT LESION/NIPPLE LESION/AREOLAR LESION      Description: Breast Surgery Lumpectomy;  Recorded: 07/09/2014;     HC REMOVAL GALLBLADDER      Description: Cholecystectomy;  Recorded: 07/09/2014;     HC REMOVAL OF TONSILS,<11 Y/O      Description: Tonsillectomy;  Recorded: 07/09/2014;     [facility] CERV SPINE FUSN,ANTER,BELOW C2      Description: Cervical Vertebral Fusion;  Recorded: 07/09/2014;     [facility] GARY W/O FACETEC FORAMOT/DSKC 1/2 VRT SEG, CERVICAL      Description: Laminectomy Lumbar;  Recorded: 07/09/2014;     [facility] TOTAL ABDOM HYSTERECTOMY      Description: Hysterectomy;  Recorded: 07/09/2014;       REVIEW OF SYSTEMS:   Constitutional: no fever, chills, or sweats  Eyes: No visual disturbance or irritation  ENT: No nose or throat congestion or pain  Respiratory: No wheezes, cough, shortness of breath, or pain with breathing  Cardiovascular: No chest pain or palpitations  Gastrointestinal: No nausea, vomiting, diarrhea, dyspepsia, or pain  Genitourinary: No urgency, frequency, or dysuria  Integument/breast: No rash, pruritis, or lesion  Hematologic/lymphatic: No bleeding or unusual bruising  Musculoskeletal: As  above  Neurological: No headache, acute arm or leg numbness or weakness, dizziness, or gait disturbance  Psychiatric: No anxiety, or depression, or hallucinations  Endocrine: No unusual fatigue, appetite disturbance, sleep disturbance, or unusual weight loss or gain  Allergic/Immunologic: No hives, allergic swelling or wheeze or rhinitis  All other systems on reveiw are negative.    Social History     Socioeconomic History     Marital status:      Spouse name: Not on file     Number of children: Not on file     Years of education: Not on file     Highest education level: Not on file   Occupational History     Not on file   Tobacco Use     Smoking status: Former Smoker     Types: Cigarettes     Quit date: 2009     Years since quittin.8     Smokeless tobacco: Never Used   Substance and Sexual Activity     Alcohol use: Yes     Alcohol/week: 3.3 - 4.2 standard drinks     Types: 4 - 5 drink(s) per week     Drug use: Not on file     Sexual activity: Not on file   Other Topics Concern     Not on file   Social History Narrative     Not on file     Social Determinants of Health     Financial Resource Strain: Low Risk      Difficulty of Paying Living Expenses: Not hard at all   Food Insecurity: No Food Insecurity     Worried About Running Out of Food in the Last Year: Never true     Ran Out of Food in the Last Year: Never true   Transportation Needs: No Transportation Needs     Lack of Transportation (Medical): No     Lack of Transportation (Non-Medical): No   Physical Activity: Inactive     Days of Exercise per Week: 0 days     Minutes of Exercise per Session: 0 min   Stress: No Stress Concern Present     Feeling of Stress : Only a little   Social Connections: Socially Isolated     Frequency of Communication with Friends and Family: Once a week     Frequency of Social Gatherings with Friends and Family: Once a week     Attends Spiritism Services: Never     Active Member of Clubs or Organizations: No      "Attends Club or Organization Meetings: Never     Marital Status: Never    Intimate Partner Violence: Not At Risk     Fear of Current or Ex-Partner: No     Emotionally Abused: No     Physically Abused: No     Sexually Abused: No   Housing Stability: Low Risk      Unable to Pay for Housing in the Last Year: No     Number of Places Lived in the Last Year: 1     Unstable Housing in the Last Year: No          Family History   Adopted: Yes   Problem Relation Age of Onset     Breast Cancer Mother        PHYSICAL EXAM:  General Appearance: In no acute distress  BP (!) 157/88   Pulse 94   Temp 99.7  F (37.6  C) (Oral)   Ht 1.6 m (5' 3\")   Wt 63.5 kg (140 lb)   SpO2 94%   BMI 24.80 kg/m    EYES: Clear, without inflammation   HEENT: Without congestion or inflammation  RESPIRATORY: Respirations nonlabored  CARDIOVASCULAR: Trace left le edema   ABDOMEN: soft and non-tender  RECTAL: deferred  GENITOURINARY: deferred  MUSCULOSKELETAL: Painful active range of motion right lower  NEUROLOGIC: No focal arm or leg  weakness, speech is clear        Labs Reviewed:   Recent Results (from the past 24 hour(s))   CBC with platelets    Collection Time: 07/27/22  9:45 PM   Result Value Ref Range    WBC Count 10.9 4.0 - 11.0 10e3/uL    RBC Count 3.65 (L) 3.80 - 5.20 10e6/uL    Hemoglobin 13.2 11.7 - 15.7 g/dL    Hematocrit 40.0 35.0 - 47.0 %     (H) 78 - 100 fL    MCH 36.2 (H) 26.5 - 33.0 pg    MCHC 33.0 31.5 - 36.5 g/dL    RDW 13.3 10.0 - 15.0 %    Platelet Count 321 150 - 450 10e3/uL   Basic metabolic panel    Collection Time: 07/27/22  9:45 PM   Result Value Ref Range    Sodium 139 136 - 145 mmol/L    Potassium 4.4 3.5 - 5.0 mmol/L    Chloride 105 98 - 107 mmol/L    Carbon Dioxide (CO2) 22 22 - 31 mmol/L    Anion Gap 12 5 - 18 mmol/L    Urea Nitrogen 21 8 - 22 mg/dL    Creatinine 0.95 0.60 - 1.10 mg/dL    Calcium 10.1 8.5 - 10.5 mg/dL    Glucose 96 70 - 125 mg/dL    GFR Estimate 69 >60 mL/min/1.73m2          "

## 2022-07-28 NOTE — PLAN OF CARE
[facility]      OUTPATIENT OCCUPATIONAL THERAPY  EVALUATION  PLAN OF TREATMENT FOR OUTPATIENT REHABILITATION  (COMPLETE FOR INITIAL CLAIMS ONLY)  Patient's Last Name, First Name, M.I.  YOB: 1965  Anthony De JesusRachel  BREE                          Provider's Name  [facility] Medical Record No.  [id number]                               Onset Date:  07/27/22   Start of Care Date:  07/28/22     Type:     ___PT   _X_OT   ___SLP Medical Diagnosis:  hip pain                        OT Diagnosis:  dec ADL indep   Visits from SOC:  1   _________________________________________________________________________________  Plan of Treatment/Functional Goals    Planned Interventions: ADL retraining, IADL retraining, balance training, bed mobility training, cognition, strengthening, transfer training, home program guidelines, progressive activity/exercise   Goals: See Occupational Therapy Goals on Care Plan in [facility] electronic health record.    Therapy Frequency: Daily  Predicted Duration of Therapy Intervention: 08/04/22  _________________________________________________________________________________    I CERTIFY THE NEED FOR THESE SERVICES FURNISHED UNDER        THIS PLAN OF TREATMENT AND WHILE UNDER MY CARE     (Physician co-signature of this document indicates review and certification of the therapy plan).                Certification date from: 07/28/22, Certification date to: 08/27/22    Referring Physician: Dada Rubio DO            Initial Assessment        See Occupational Therapy evaluation dated 07/28/22 in Epic electronic health record.

## 2022-07-28 NOTE — PROGRESS NOTES
07/28/22 1345   Quick Adds   Quick Adds Certification   Type of Visit Initial Occupational Therapy Evaluation   Living Environment   People in Home alone   Current Living Arrangements condominium   Living Environment Comments tub shower   Self-Care   Equipment Currently Used at Home walker, standard;shower chair;commode chair   Fall history within last six months yes   Number of times patient has fallen within last six months 7   Activity/Exercise/Self-Care Comment has been ind for ADLs but pt reports she hasn't been able to get in her tub   Instrumental Activities of Daily Living (IADL)   IADL Comments ind for IADLs but reports she's been having difficulty caring for herself   General Information   Onset of Illness/Injury or Date of Surgery 07/27/22   Referring Physician Dada Rubio DO   Patient/Family Therapy Goal Statement (OT) decrease pain   Additional Occupational Profile Info/Pertinent History of Current Problem admited following fall on 7/23 causing L 5th metacarpal fx, having significant hip pain; PMH 6/30 c3 transverse process fx   Existing Precautions/Restrictions fall;other (see comments)  (cervical collar at all times)   Left Upper Extremity (Weight-bearing Status) other (see comments)  (no WB orders, assuming NWB d/t metacarpal fx)   Cognitive Status Examination   Orientation Status orientation to person, place and time   Safety Deficit impulsivity;safety precautions follow-through/compliance   Pain Assessment   Patient Currently in Pain Yes, see Vital Sign flowsheet   Range of Motion Comprehensive   General Range of Motion bilateral upper extremity ROM WFL   Comment, General Range of Motion except L hand in cast   Strength Comprehensive (MMT)   Comment, General Manual Muscle Testing (MMT) Assessment RUE WFL, LUE appears WFL but L hand in cast   Bed Mobility   Bed Mobility supine-sit;sit-supine;scooting/bridging   Scooting/Bridging [place] (Bed Mobility) minimum assist (75% patient  effort);verbal cues   Supine-Sit [place] (Bed Mobility) verbal cues;moderate assist (50% patient effort)   Sit-Supine [place] (Bed Mobility) verbal cues;moderate assist (50% patient effort)   Transfers   Transfers sit-stand transfer;toilet transfer   Sit-Stand Transfer   Sit-Stand [place] (Transfers) minimum assist (75% patient effort);verbal cues   Toilet Transfer   Type (Toilet Transfer) stand-sit;sit-stand   [place] Level (Toilet Transfer) moderate assist (50% patient effort);verbal cues   Assistive Device (Toilet Transfer) grab bars/safety frame   Balance   Balance Comments min A and support at L elbow required for SPT   Activities of Daily Living   BADL Assessment/Intervention toileting;grooming;lower body dressing;bathing   Bathing Assessment/Intervention   [place] Level (Bathing) maximum assist (25% patient effort)   Comment, (Bathing) not formally assessed, would requir significant assistance to complete tub transfer   Lower Body Dressing Assessment/Training   [place] Level (Lower Body Dressing) maximum assist (25% patient effort)   Comment, (Lower Body Dressing) not formally assessed, pt would require significant assist d/t pain   Grooming Assessment/Training   Comment, (Grooming) not formally assessed, would not be able to complete standing d/t pain   Toileting   [place] Level (Toileting) moderate assist (50% patient effort)   Assistive Devices (Toileting) grab bar, toilet   Clinical Impression   Criteria for Skilled Therapeutic Interventions Met (OT) Yes, treatment indicated   OT Diagnosis dec ADL indep   OT Problem List-Impairments impacting ADL problems related to;activity tolerance impaired;balance;cognition;mobility;pain;strength   Assessment of Occupational Performance 3-5 Performance Deficits   Identified Performance Deficits dressing, toileting, bathing, grooming, functional transfers   Planned Therapy Interventions (OT) ADL retraining;IADL retraining;balance  training;bed mobility training;cognition;strengthening;transfer training;home program guidelines;progressive activity/exercise   Clinical Decision Making Complexity (OT) moderate complexity   Risk & Benefits of therapy have been explained evaluation/treatment results reviewed;participants included;patient   OT Discharge Planning   OT Discharge Recommendation (DC Rec) Transitional Care Facility;home with home care occupational therapy   OT Rationale for DC Rec currently requires assist of 1 and has very low activity tolerance - only able to pivot transfer, would require 24/7 heavy physical assist to d/c home   Therapy Certification   Start of Care Date 07/28/22   Certification date from 07/28/22   Certification date to 08/27/22   Medical Diagnosis hip pain   Total Evaluation Time (Minutes)   Total Evaluation Time (Minutes) 10   OT Goals   Therapy Frequency (OT) Daily   OT Predicted Duration/Target Date for Goal Attainment 08/04/22   OT Goals Hygiene/Grooming;Lower Body Dressing;Toilet Transfer/Toileting;Cognition;OT Goal 1   OT: Hygiene/Grooming supervision/stand-by assist;while standing   OT: Lower Body Dressing Supervision/stand-by assist;using adaptive equipment   OT: Toilet Transfer/Toileting Supervision/stand-by assist;toilet transfer;cleaning and garment management   OT: Cognitive Patient/caregiver will verbalize understanding of cognitive assessment results/recommendations as needed for safe discharge planning   OT: Goal 1 Pt will be SBA for tub transfer in simulated home environment.

## 2022-07-28 NOTE — PLAN OF CARE
[facility]      OUTPATIENT PHYSICAL THERAPY EVALUATION  PLAN OF TREATMENT FOR OUTPATIENT REHABILITATION  (COMPLETE FOR INITIAL CLAIMS ONLY)  Patient's Last Name, First Name, M.I.  YOB: 1965  Rachel Gruber                        Provider's Name  [facility] Medical Record No.  [id number]                               Onset Date:  07/27/22   Start of Care Date:  07/28/22      Type:     _X_PT   ___OT   ___SLP Medical Diagnosis:  R hip pain                        PT Diagnosis:  Impaired functional mobility   Visits from SOC:  1   _________________________________________________________________________________  Plan of Treatment/Functional Goals    Planned Interventions: bed mobility training, gait training, patient/family education, stair training, strengthening, transfer training     Goals: See Physical Therapy Goals on Care Plan in [facility] electronic health record.    Therapy Frequency: Daily  Predicted Duration of Therapy Intervention: 08/04/22  _________________________________________________________________________________    I CERTIFY THE NEED FOR THESE SERVICES FURNISHED UNDER        THIS PLAN OF TREATMENT AND WHILE UNDER MY CARE     (Physician co-signature of this document indicates review and certification of the therapy plan).              Certification date from: 07/28/22, Certification date to: 08/04/22    Referring Physician: Dada Rubio DO            Initial Assessment        See Physical Therapy evaluation dated 07/28/22 in Epic electronic health record.

## 2022-07-28 NOTE — PROGRESS NOTES
07/28/22 1535   Quick Adds   Quick Adds Certification   Type of Visit Initial PT Evaluation   Living Environment   People in Home alone   Current Living Arrangements condominium   Home Accessibility stairs to enter home   Number of Stairs, Main Entrance 1   Stair Railings, Main Entrance none   Transportation Anticipated car, drives self   Living Environment Comments Lives alone, son lives in [place], daughter lives in [place] neither are able to provide assist   Self-Care   Usual Activity Tolerance good   Current Activity Tolerance poor   Equipment Currently Used at Home walker, rolling   Fall history within last six months yes   Number of times patient has fallen within last six months 7   Activity/Exercise/Self-Care Comment Previously independent with ADLs, son assists with some food preparation and cleaning   General Information   Onset of Illness/Injury or Date of Surgery 07/27/22   Referring Physician Dada Rubio DO   Patient/Family Therapy Goals Statement (PT) None stated   Pertinent History of Current Problem (include personal factors and/or comorbidities that impact the POC) 56-year-old female with history of anxiety/depression, mood disorder, chronic low back pain, fibromyalgia, hypertension, hepatic steatosis, asthma, prior C3 transverse process fracture after fall and reflux esophagitis with benign-appearing esophageal stricture, nonbleeding duodenal ulcer and known hip OA presents with right hip pain.   Existing Precautions/Restrictions fall   Cognition   Affect/Mental Status (Cognition) WNL   Orientation Status (Cognition) oriented x 4   Follows Commands (Cognition) WNL   Range of Motion (ROM)   ROM Comment L LE ROM WFL, RLE ROM limited by pain   Strength (Manual Muscle Testing)   Strength Comments LLE strength WFL, RLE impaired and limited by pain   Bed Mobility   Bed Mobility supine-sit;sit-supine   Supine-Sit [place] (Bed Mobility) supervision;verbal cues   Sit-Supine [place]  (Bed Mobility) supervision;verbal cues   Bed Mobility Limitations decreased ability to use legs for bridging/pushing;decreased ability to use arms for pushing/pulling   Impairments Contributing to Impaired Bed Mobility pain;decreased ROM;decreased strength   Assistive Device (Bed Mobility) bed rails   Comment, (Bed Mobility) SBA x 1, verbal cueing for sequencing. Increased time required due to increased pain in R hip.   Transfers   Comment, (Transfers) Patient unable to attempt sit to stand transfer due to increase R hip pain prevent patient from scooting to EOB to set up for transfer.   Gait/Stairs (Locomotion)   Comment, (Gait/Stairs) Unable to assess, patient unable to mobilize out of bed at this time due to increase R hip pain with bed mobility and general hip movements   Sensory Examination   Sensory Perception WNL   Clinical Impression   Criteria for Skilled Therapeutic Intervention Yes, treatment indicated   PT Diagnosis (PT) Impaired functional mobility   Influenced by the following impairments Pain   Functional limitations due to impairments Bed mobility, gait, transfers, stairs   Clinical Presentation (PT Evaluation Complexity) Evolving/Changing   Clinical Presentation Rationale Pt presents as medically diagnosed.   Clinical Decision Making (Complexity) low complexity   Planned Therapy Interventions (PT) bed mobility training;gait training;patient/family education;stair training;strengthening;transfer training   Anticipated Equipment Needs at Discharge (PT) walker, rolling   Risk & Benefits of therapy have been explained evaluation/treatment results reviewed;care plan/treatment goals reviewed;participants voiced agreement with care plan;participants included;patient   PT Discharge Planning   PT Discharge Recommendation (DC Rec) Transitional Care Facility;home with assist   PT Rationale for DC Rec Patient currently has very low tolerance for activity due to increased R hip pain. She is unable to perform bed  mobility, transfers, or gait at this time. She lives alone and does not have access to assistance. If she were to discharge home, she would required 24 hr assist and use of as assistive device. If that level of assistance can not be obtained, patient would benefit from TCU in order to improve activity tolerance and strength prior to discharghing home.   Therapy Certification   Start of care date 07/28/22   Certification date from 07/28/22   Certification date to 08/04/22   Medical Diagnosis R hip pain   Total Evaluation Time   Total Evaluation Time (Minutes) 17   Physical Therapy Goals   PT Frequency Daily   PT Predicted Duration/Target Date for Goal Attainment 08/04/22   PT Goals Bed Mobility;Transfers   PT: Bed Mobility Modified independent;Supine to/from sit   PT: Transfers Moderate assist;Sit to/from stand;Assistive device     Stacey Hernandez DPT

## 2022-07-28 NOTE — CONSULTS
Care Management Initial Consult    General Information  Assessment completed with: Patient, pt  Type of CM/SW Visit: Initial Assessment    Primary Care Provider verified and updated as needed: Yes   Readmission within the last 30 days: current reason for admission unrelated to previous admission   Return Category: Progression of disease  Reason for Consult: discharge planning  Advance Care Planning: Advance Care Planning Reviewed: verified with patient     General Information Comments: lives alone and children supportive, no svcs and independent at baseline    Communication Assessment  Patient's communication style: spoken language (English or Bilingual)             Cognitive  Cognitive/Neuro/Behavioral: WDL                      Living Environment:   People in home: alone     Current living Arrangements: condominium      Able to return to prior arrangements: yes       Family/Social Support:  Care provided by: self  Provides care for: no one  Marital Status: Single  Children          Description of Support System: Supportive, Involved    Support Assessment: Adequate family and caregiver support, Adequate social supports    Current Resources:   Patient receiving home care services: No     Community Resources: DME  Equipment currently used at home: walker, standard  Supplies currently used at home: None    Employment/Financial:  Employment Status: disabled        Financial Concerns: No concerns identified   Referral to Financial Worker: No       Lifestyle & Psychosocial Needs:  Social Determinants of Health     Tobacco Use: Medium Risk     Smoking Tobacco Use: Former Smoker     Smokeless Tobacco Use: Never Used   Alcohol Use: Heavy Drinker     Frequency of Alcohol Consumption: 2-4 times a month     Average Number of Drinks: 1 or 2     Frequency of Binge Drinking: Less than monthly   Financial Resource Strain: Low Risk      Difficulty of Paying Living Expenses: Not hard at all   Food Insecurity: No Food Insecurity      Worried About Running Out of Food in the Last Year: Never true     Ran Out of Food in the Last Year: Never true   Transportation Needs: No Transportation Needs     Lack of Transportation (Medical): No     Lack of Transportation (Non-Medical): No   Physical Activity: Inactive     Days of Exercise per Week: 0 days     Minutes of Exercise per Session: 0 min   Stress: No Stress Concern Present     Feeling of Stress : Only a little   Social Connections: Socially Isolated     Frequency of Communication with Friends and Family: Once a week     Frequency of Social Gatherings with Friends and Family: Once a week     Attends Orthodox Services: Never     Active Member of Clubs or Organizations: No     Attends Club or Organization Meetings: Never     Marital Status: Never    Intimate Partner Violence: Not At Risk     Fear of Current or Ex-Partner: No     Emotionally Abused: No     Physically Abused: No     Sexually Abused: No   Depression: Not at risk     PHQ-2 Score: 0   Housing Stability: Low Risk      Unable to Pay for Housing in the Last Year: No     Number of Places Lived in the Last Year: 1     Unstable Housing in the Last Year: No       Functional Status:  Prior to admission patient needed assistance:   Dependent ADLs:: Independent, Ambulation-walker  Dependent IADLs:: Independent  Assesssment of Functional Status: Not at baseline with ADL Functioning, Not at baseline with mobility, Not at  functional baseline    Mental Health Status:  Mental Health Status: No Current Concerns       Chemical Dependency Status:                Values/Beliefs:  Spiritual, Cultural Beliefs, Orthodox Practices, Values that affect care:                 Additional Information:  Assessed. lives alone and children supportive, no svcs and independent at baseline w/walker use for long distance. Family to transport and discussed BURRELL.       Monica Trent RN

## 2022-07-28 NOTE — PHARMACY-ADMISSION MEDICATION HISTORY
Pharmacy Note - Admission Medication History    Pertinent Provider Information:      ______________________________________________________________________    Prior To Admission (PTA) med list completed and updated in EMR.       PTA Med List   Medication Sig Last Dose     albuterol (PROAIR HFA/PROVENTIL HFA/VENTOLIN HFA) 108 (90 Base) MCG/ACT inhaler Inhale 2 puffs into the lungs every 4 hours as needed prn     celecoxib (CELEBREX) 200 MG capsule Take 200 mg by mouth 2 times daily not started yet     DULoxetine (CYMBALTA) 60 MG capsule Take 120 mg by mouth At Bedtime 7/26/2022 at Unknown time     fluticasone (FLONASE) 50 MCG/ACT nasal spray Spray 2 sprays in nostril daily as needed prn     folic acid (FOLVITE) 1 MG tablet Take 1 tablet (1 mg) by mouth daily 7/27/2022 at Unknown time     gabapentin (NEURONTIN) 300 MG capsule Take 3 capsules (900 mg) by mouth every 8 hours 7/27/2022 at x2     ketotifen (ZADITOR) 0.025 % ophthalmic solution Place 1 drop into both eyes 2 times daily as needed prn     loratadine (CLARITIN) 10 MG tablet Take 10 mg by mouth daily as needed for allergies prn     multivitamin, therapeutic (THERA-VIT) TABS tablet Take 1 tablet by mouth daily 7/27/2022 at Unknown time     ondansetron (ZOFRAN ODT) 4 MG ODT tab Take 4 mg by mouth every 8 hours as needed for nausea prn     pantoprazole (PROTONIX) 40 MG EC tablet Take 1 tablet (40 mg) by mouth daily 7/27/2022 at Unknown time     thiamine (B-1) 100 MG tablet Take 1 tablet (100 mg) by mouth daily 7/27/2022 at Unknown time       Information source(s): Patient and CareEverywhere/SureScripts  Method of interview communication: in-person    Summary of Changes to PTA Med List  New: celebrex, ketotifen eye drop, multivitamin  Discontinued: n/a  Changed: Flonse    Patient was asked about OTC/herbal products specifically.  PTA med list reflects this.    In the past week, patient estimated taking medication this percent of the time:  greater than  90%.    Allergies were reviewed, assessed, and updated with the patient.      Patient did not bring any medications to the hospital and can't retrieve from home. No multi-dose medications are available for use during hospital stay.     The information provided in this note is only as accurate as the sources available at the time of the update(s).    Thank you for the opportunity to participate in the care of this patient.    Clarita Giles [facility]  7/27/2022 10:50 PM

## 2022-07-29 ENCOUNTER — APPOINTMENT (OUTPATIENT)
Dept: PHYSICAL THERAPY | Facility: HOSPITAL | Age: 57
End: 2022-07-29
Payer: COMMERCIAL

## 2022-07-29 ENCOUNTER — APPOINTMENT (OUTPATIENT)
Dept: OCCUPATIONAL THERAPY | Facility: HOSPITAL | Age: 57
End: 2022-07-29
Payer: COMMERCIAL

## 2022-07-29 PROCEDURE — 250N000013 HC RX MED GY IP 250 OP 250 PS 637: Performed by: INTERNAL MEDICINE

## 2022-07-29 PROCEDURE — 97129 THER IVNTJ 1ST 15 MIN: CPT | Mod: GO

## 2022-07-29 PROCEDURE — 97110 THERAPEUTIC EXERCISES: CPT | Mod: GP

## 2022-07-29 PROCEDURE — 99232 SBSQ HOSP IP/OBS MODERATE 35: CPT | Performed by: INTERNAL MEDICINE

## 2022-07-29 PROCEDURE — G0378 HOSPITAL OBSERVATION PER HR: HCPCS

## 2022-07-29 PROCEDURE — 250N000013 HC RX MED GY IP 250 OP 250 PS 637: Performed by: PAIN MEDICINE

## 2022-07-29 PROCEDURE — 97530 THERAPEUTIC ACTIVITIES: CPT | Mod: GP

## 2022-07-29 RX ORDER — NALOXONE HYDROCHLORIDE 0.4 MG/ML
0.4 INJECTION, SOLUTION INTRAMUSCULAR; INTRAVENOUS; SUBCUTANEOUS
Status: DISCONTINUED | OUTPATIENT
Start: 2022-07-29 | End: 2022-08-08 | Stop reason: HOSPADM

## 2022-07-29 RX ORDER — NALOXONE HYDROCHLORIDE 0.4 MG/ML
0.2 INJECTION, SOLUTION INTRAMUSCULAR; INTRAVENOUS; SUBCUTANEOUS
Status: DISCONTINUED | OUTPATIENT
Start: 2022-07-29 | End: 2022-08-08 | Stop reason: HOSPADM

## 2022-07-29 RX ORDER — ACETAMINOPHEN 500 MG
1000 TABLET ORAL EVERY 8 HOURS PRN
Status: DISCONTINUED | OUTPATIENT
Start: 2022-07-29 | End: 2022-07-30

## 2022-07-29 RX ORDER — OXYCODONE HYDROCHLORIDE 5 MG/1
10 TABLET ORAL EVERY 6 HOURS PRN
Status: DISCONTINUED | OUTPATIENT
Start: 2022-07-29 | End: 2022-08-08 | Stop reason: HOSPADM

## 2022-07-29 RX ADMIN — PANTOPRAZOLE SODIUM 40 MG: 40 TABLET, DELAYED RELEASE ORAL at 08:18

## 2022-07-29 RX ADMIN — FOLIC ACID 1 MG: 1 TABLET ORAL at 08:18

## 2022-07-29 RX ADMIN — DULOXETINE HYDROCHLORIDE 120 MG: 60 CAPSULE, DELAYED RELEASE PELLETS ORAL at 21:19

## 2022-07-29 RX ADMIN — THIAMINE HCL TAB 100 MG 100 MG: 100 TAB at 08:17

## 2022-07-29 RX ADMIN — FLUTICASONE PROPIONATE 2 SPRAY: 50 SPRAY, METERED NASAL at 21:15

## 2022-07-29 RX ADMIN — OXYCODONE HYDROCHLORIDE 10 MG: 5 TABLET ORAL at 12:19

## 2022-07-29 RX ADMIN — LIDOCAINE 3 PATCH: 246 PATCH TOPICAL at 08:22

## 2022-07-29 RX ADMIN — AMLODIPINE BESYLATE 5 MG: 5 TABLET ORAL at 08:17

## 2022-07-29 RX ADMIN — GABAPENTIN 1200 MG: 300 CAPSULE ORAL at 16:39

## 2022-07-29 RX ADMIN — OXYCODONE HYDROCHLORIDE 10 MG: 5 TABLET ORAL at 18:47

## 2022-07-29 RX ADMIN — ACETAMINOPHEN 1000 MG: 500 TABLET ORAL at 18:05

## 2022-07-29 RX ADMIN — GABAPENTIN 1200 MG: 300 CAPSULE ORAL at 00:30

## 2022-07-29 RX ADMIN — THERA TABS 1 TABLET: TAB at 08:17

## 2022-07-29 RX ADMIN — GABAPENTIN 1200 MG: 300 CAPSULE ORAL at 08:17

## 2022-07-29 RX ADMIN — ACETAMINOPHEN 650 MG: 325 TABLET ORAL at 08:17

## 2022-07-29 NOTE — PLAN OF CARE
"PRIMARY DIAGNOSIS: \"GENERIC\" NURSING  OUTPATIENT/OBSERVATION GOALS TO BE MET BEFORE DISCHARGE:  ADLs back to baseline: No    Activity and level of assistance: Up with maximum assistance. Consider SW and/or PT evaluation.     Pain status: Improved-controlled with oral pain medications.    Return to near baseline physical activity: No     Discharge Planner Nurse   Safe discharge environment identified: No  Barriers to discharge: No       Entered by: Marquita Bay RN 07/29/2022 5:34 AM     Please review provider order for any additional goals.   Nurse to notify provider when observation goals have been met and patient is ready for discharge.    Patient alert, oriented x 3. Reported left hip and leg pain, rating 8/10, aching. Sharp, and constant in nature. Administered Neurontin at bedtime. Reported relief and was noted sleeping.  CMS left hand intact.  "

## 2022-07-29 NOTE — PLAN OF CARE
PRIMARY DIAGNOSIS: ACUTE PAIN  OUTPATIENT/OBSERVATION GOALS TO BE MET BEFORE DISCHARGE:  1. Pain Status: Improved-controlled with oral pain medications.    2. Return to near baseline physical activity: Yes    3. Cleared for discharge by consultants (if involved): Yes    Discharge Planner Nurse   Safe discharge environment identified: No  Barriers to discharge: Yes       Entered by: Donte Love RN 07/29/2022 12:25 PM   Patient discharge pending TCU. Pain managed by PRN and scheduled Analgesics.  Please review provider order for any additional goals.   Nurse to notify provider when observation goals have been met and patient is ready for discharge.Goal Outcome Evaluation:

## 2022-07-29 NOTE — UTILIZATION REVIEW
Concurrent stay review; Secondary Review Determination     Under the authority of the Utilization Management Committee, the utilization review process indicated a secondary review on Rachel De Jesus.  The review outcome is based on review of the medical records, discussions with staff, and applying clinical experience noted on the date of the review.        (x) Observation Status Appropriate - Concurrent stay review    RATIONALE FOR DETERMINATION   56 yr old female with anxiety/depression, mood disorder, chronic LBP, HTN, asthma, duodenal ulcer and known hip OA presented with right hip pain.  Severe degenerative changes.  Pain team following.  Started antihypertensives.  Ortho consultation with no immediate need for surgery.  Needs placement as not able to manage until surgery can be performed as elective.     Patient is clinically improving and there is no clear indication to change patient's status to inpatient. The severity of illness, intensity of service provided, expected LOS and risk for adverse outcome make the care appropriate for observation.    The information on this document is developed by the utilization review team in order for the business office to ensure compliance.  This only denotes the appropriateness of proper admission status and does not reflect the quality of care rendered.         The definitions of Inpatient Status and Observation Status used in making the determination above are those provided in the CMS Coverage Manual, Chapter 1 and Chapter 6, section 70.4.      Sincerely,   Kaylee Thomas MD  Utilization Review  Physician Advisor  [facility]

## 2022-07-29 NOTE — PLAN OF CARE
Problem: Plan of Care - These are the overarching goals to be used throughout the patient stay.    Goal: Absence of Hospital-Acquired Illness or Injury  Outcome: Ongoing, Progressing  Intervention: Identify and Manage Fall Risk  Recent Flowsheet Documentation  Taken 7/28/2022 1805 by Donte Royal RN  Safety Promotion/Fall Prevention:   activity supervised   lighting adjusted   patient and family education   safety round/check completed   supervised activity     Problem: Plan of Care - These are the overarching goals to be used throughout the patient stay.    Goal: Optimal Comfort and Wellbeing  Outcome: Ongoing, Progressing  Intervention: Monitor Pain and Promote Comfort  Recent Flowsheet Documentation  Taken 7/28/2022 2006 by Donte Royal RN  Pain Management Interventions: medication (see MAR)     Problem: Pain Acute  Goal: Acceptable Pain Control and Functional Ability  Outcome: Ongoing, Progressing  Intervention: Develop Pain Management Plan  Recent Flowsheet Documentation  Taken 7/28/2022 2006 by Donte Royal RN  Pain Management Interventions: medication (see MAR)  Intervention: Prevent or Manage Pain  Recent Flowsheet Documentation  Taken 7/28/2022 1805 by Donte Royal RN  Medication Review/Management: medications reviewed

## 2022-07-29 NOTE — PROGRESS NOTES
"Orthopedic Progress Note      Assessment:    Chronic right hip pain, severe osteoarthritis     Plan:   -No acute surgical intervention  -Conservative treatment  -WBAT RLE  -PT/OT  -Pain management per hospital team  -Recommend outpatient follow-up with [place] [facility]      Subjective:  Pain: mild-moderate  Neuro:  Patient denies new onset numbness or paresthesias    Patient reports pain maintained to stay the same.  Patient reports only able to get up to the commode with therapy, however painful.  Patient reports feeling unsafe to go home given current right hip pain and unable to ambulate well.  Patient reports coordinating with [facility] orthopedics in hopes to transport to [facility] and schedule inpatient right PETRA, otherwise planning to stay at [facility] until she has surgery for right hip.  All questions and concerns answered.    Objective:  BP (!) 166/90 (BP Location: Right arm)   Pulse 110   Temp 98.7  F (37.1  C) (Oral)   Resp 16   Ht 1.6 m (5' 3\")   Wt 63.5 kg (140 lb)   SpO2 93%   BMI 24.80 kg/m    General: On examination, the patient is awake and alert and oriented to general circumstances   SKIN: There is no evidence of ecchymosis, warmth, erythema, edema, deformity, break to the skin, open wound.  Pulses:  dorsalis pedis pulse is intact  Sensation: L4-S1 intact  Tenderness: NTTP femur, knee, tibia, fibula, ankle joint, foot.  ROM: Limited hip range of motion due to pain.  AROM knee 0 to 45 degrees with pain.    Pertinent Labs   Lab Results: personally reviewed.   Lab Results   Component Value Date    INR 0.93 06/28/2022    INR 0.91 06/21/2019     Lab Results   Component Value Date    WBC 10.9 07/27/2022    HGB 13.2 07/27/2022    HCT 40.0 07/27/2022     (H) 07/27/2022     07/27/2022     Lab Results   Component Value Date     07/27/2022    CO2 22 07/27/2022         Report completed by:  Jackie Tucker PA-C, [place]  Date: 7/29/2022  Time: 1:42 PM    " Instructed to call immediately if any new distortion, blurring, decreased vision or eye pain.

## 2022-07-29 NOTE — PLAN OF CARE
PRIMARY DIAGNOSIS: ACUTE PAIN  OUTPATIENT/OBSERVATION GOALS TO BE MET BEFORE DISCHARGE:  1. Pain Status: Improved-controlled with oral pain medications.    2. Return to near baseline physical activity: No    3. Cleared for discharge by consultants (if involved): Yes    Discharge Planner Nurse   Safe discharge environment identified: No  Barriers to discharge: No       Entered by: Donte Love RN 07/29/2022 12:23 PM   Patient receiving PRN Oxycodone 10 mg. MD increased frequency to Q6hrs.   Please review provider order for any additional goals.   Nurse to notify provider when observation goals have been met and patient is ready for discharge.Goal Outcome Evaluation:

## 2022-07-29 NOTE — PROGRESS NOTES
[facility] ACUTE PAIN SERVICE    ([facility])  Daily PAIN Progress Note    Assessment/Plan:  Rachel De Jesus is a 56 year old female who was admitted on 7/27/2022.  I was asked to see the patient for hip pain. Admitted for right sided hip pain. History of HTN, EtOH use disorder, recent fractures, frequent falls of fibromyalgia. Pain more than 1 year ago.  The right-sided hip and groin pain has significantly reduced quality of life, and June she tried a steroid injection which was not helpful, she tried physical therapy a year ago, and continues to have severe pain-unable to bear any weight on the right hip, unable to drive, unable to perform self-care.  The x-ray did show degenerative arthritis which is severe and complete joint space loss.    Pt reporting pain control during assessment with current regimen. Upset that she is not having hip surgery. Wants to leave and go to another hospital.     PLAN:   1) severe right-sided hip pain secondary to severe degenerative arthritis.  Given recent sobriety would avoid opioids unless there is a need postoperatively.  Increased gabapentin and add topicals.  At a maximum could offer oxycodone 1-2 times per day.  Although this does need to be in a controlled setting.  2)Multimodal Medication Therapy  Topical:  Lidocaine patch 4% x 3   Adjuvants:  Gabapentin Increase to 1200 3 times daily, APAP prn - can schedule this, however would recommend liver panel to assess ast/alt - have historically ran elevated - changed to 1g po tid prn per [facility] [place], given bleeding history would avoid NSAIDs, duloxetine 120 mg at at bedtime  Opioids:  [id number] increased from q12h prn to q6h PRN per [facility] - recommend decreased dose or frequency - has only used x 2 doses of oxy 10 mg in 24 hours  3)Non-medication interventions- Ice, Heat, physical therapy  4)Constipation Prophylaxis- senna and miralax PRN   5) Follow up -TBD pending orthopedics  consult, patient will certainly require TCU    Discharge reccs: increased dose of gabapentin, lidocaine patches, APAP, opioids not recommended however if ordered would limit to </=3 day supply or </= 100 MME.        MN -pulled from system on 07/28/22. Last refill on 7/1. This indicated no opioid use. 1 total number of prescribing providers noted.     Cyntiha Ponce, PharmD, BCPS, CPE  Acute Care Pain Management Program   Hours of pain coverage 7a-1700- after 1700 please call the house officer   [facility] ([facility], [place], [place])   Page via [facility]- Click HERE to page [place] or call [phone]

## 2022-07-29 NOTE — PROGRESS NOTES
"PRIMARY DIAGNOSIS: ACUTE PAIN  OUTPATIENT/OBSERVATION GOALS TO BE MET BEFORE DISCHARGE:  1. Pain Status: 7/10 right hip pain    2. Return to near baseline physical activity: No    3. Cleared for discharge by consultants (if involved): No    Discharge Planner Nurse   Safe discharge environment identified: No  Barriers to discharge: Yes       Entered by: Donte Royal RN 07/28/2022 11:16 PM     Please review provider order for any additional goals.   Nurse to notify provider when observation goals have been met and patient is ready for discharge.    Pt remains alert and oriented x4. Pt was given scheduled acetaminophen at 2006. Pt aware that she is also receiving scheduled gabapentin 1200 mg Q 8 hrs . Pt stated gabapentin helps \"a little.\" Pt utilizing ice packs. Purewick intact and pt voiding w/out difficulty. PRN oxycodone available and pt aware.  "

## 2022-07-29 NOTE — PROGRESS NOTES
PRIMARY DIAGNOSIS: ACUTE PAIN  OUTPATIENT/OBSERVATION GOALS TO BE MET BEFORE DISCHARGE:  1. Pain Status: 8-10 right hip and neck pain    2. Return to near baseline physical activity: No    3. Cleared for discharge by consultants (if involved): No    Discharge Planner Nurse   Safe discharge environment identified: No  Barriers to discharge: Yes       Entered by: Donte Royal RN 07/28/2022 11:08 PM     Please review provider order for any additional goals.   Nurse to notify provider when observation goals have been met and patient is ready for discharge.    Pt alert and oriented x4. Pt had fall last Saturday resulting in increased right hip pain. No fractures observed on pelvic xray. Degenerative changes observed on imaging. Pt also had a fall about a month ago resulting in a left upper extremity fracture (cast in place) and C3 fracture (pt utilizing neck brace). Pt unable to ambulate w/ PT in am d/t pain. O2 sat 97% on 1 L via NC. Purewick intact.

## 2022-07-29 NOTE — PROGRESS NOTES
[facility]    Medicine Progress Note - Hospitalist Service    Date of Admission:  7/27/2022    Assessment & Plan          Principal Problem:    Hip pain, right  Active Problems:    Cervicalgia    Asthma    Hepatic steatosis    Chronic low back pain    Depression    Fibromyalgia    HTN (hypertension)    Obesity    Alcohol use disorder, severe, dependence (H)          Plan : need eval for snif until her upcoming rt yemi as o/p     Will adjust pain meds up                 Diet: Regular Diet Adult    DVT Prophylaxis: Pneumatic Compression Devices   Valdez Catheter: Not present  Central Lines: None  Cardiac Monitoring: None  Code Status: Full Code      Disposition Plan      Expected Discharge Date: 07/29/2022                The patient's care was discussed with the Attending Physician, Dr. Anderson.    Luis Fernando Anderson MD  Hospitalist Service  [facility]  Securely message with the Vocera Web Console (learn more here)  Text page via [employer] Paging/Directory         Clinically Significant Risk Factors Present on Admission   Neck fracture ,falls                        ______________________________________________________________________    Interval History   {Include any events overnight / new information. 4 point ROS is needed for billing a level III severe rt hip pain    Data reviewed today: I reviewed all medications, new labs and imaging results over the last 24 hours. I personally reviewed no images or EKG's today.    Physical Exam   Vital Signs: Temp: 98.7  F (37.1  C) Temp src: Oral BP: (!) 166/90 Pulse: 110   Resp: 16 SpO2: 93 % O2 Device: None (Room air) Oxygen Delivery: 1 LPM  Weight: 140 lbs 0 oz  General Appearance: Alert ,in pain  Respiratory: clear bilaterally  Cardiovascular: RRR  GI: soft abdomen ,no masses  Skin: no rashes   Other: No cv or back tenderness     Data   Recent Labs   Lab 07/27/22  2145   WBC 10.9   HGB 13.2   *         POTASSIUM  4.4   CHLORIDE 105   CO2 22   BUN 21   CR 0.95   ANIONGAP 12   WILSON 10.1   GLC 96

## 2022-07-30 ENCOUNTER — APPOINTMENT (OUTPATIENT)
Dept: OCCUPATIONAL THERAPY | Facility: HOSPITAL | Age: 57
End: 2022-07-30
Payer: COMMERCIAL

## 2022-07-30 ENCOUNTER — APPOINTMENT (OUTPATIENT)
Dept: PHYSICAL THERAPY | Facility: HOSPITAL | Age: 57
End: 2022-07-30
Payer: COMMERCIAL

## 2022-07-30 LAB
ALBUMIN SERPL-MCNC: 3.4 G/DL (ref 3.5–5)
ALP SERPL-CCNC: 387 U/L (ref 45–120)
ALT SERPL W P-5'-P-CCNC: 271 U/L (ref 0–45)
AST SERPL W P-5'-P-CCNC: 118 U/L (ref 0–40)
BILIRUB DIRECT SERPL-MCNC: 0.3 MG/DL
BILIRUB SERPL-MCNC: 0.7 MG/DL (ref 0–1)
PROT SERPL-MCNC: 7.5 G/DL (ref 6–8)

## 2022-07-30 PROCEDURE — 99232 SBSQ HOSP IP/OBS MODERATE 35: CPT | Performed by: INTERNAL MEDICINE

## 2022-07-30 PROCEDURE — 250N000013 HC RX MED GY IP 250 OP 250 PS 637: Performed by: INTERNAL MEDICINE

## 2022-07-30 PROCEDURE — 250N000013 HC RX MED GY IP 250 OP 250 PS 637: Performed by: PAIN MEDICINE

## 2022-07-30 PROCEDURE — 97530 THERAPEUTIC ACTIVITIES: CPT | Mod: GP

## 2022-07-30 PROCEDURE — 97110 THERAPEUTIC EXERCISES: CPT | Mod: GP

## 2022-07-30 PROCEDURE — G0378 HOSPITAL OBSERVATION PER HR: HCPCS

## 2022-07-30 PROCEDURE — 97535 SELF CARE MNGMENT TRAINING: CPT | Mod: GO

## 2022-07-30 PROCEDURE — 36415 COLL VENOUS BLD VENIPUNCTURE: CPT | Performed by: PAIN MEDICINE

## 2022-07-30 PROCEDURE — 82040 ASSAY OF SERUM ALBUMIN: CPT | Performed by: PAIN MEDICINE

## 2022-07-30 PROCEDURE — 99213 OFFICE O/P EST LOW 20 MIN: CPT | Performed by: PAIN MEDICINE

## 2022-07-30 RX ORDER — ACETAMINOPHEN 325 MG/1
650 TABLET ORAL EVERY 6 HOURS PRN
Status: DISCONTINUED | OUTPATIENT
Start: 2022-07-30 | End: 2022-07-31

## 2022-07-30 RX ORDER — ACETAMINOPHEN 500 MG
1000 TABLET ORAL 3 TIMES DAILY
Status: DISCONTINUED | OUTPATIENT
Start: 2022-07-30 | End: 2022-07-30

## 2022-07-30 RX ADMIN — OXYCODONE HYDROCHLORIDE 10 MG: 5 TABLET ORAL at 17:31

## 2022-07-30 RX ADMIN — DULOXETINE HYDROCHLORIDE 120 MG: 60 CAPSULE, DELAYED RELEASE PELLETS ORAL at 22:15

## 2022-07-30 RX ADMIN — AMLODIPINE BESYLATE 5 MG: 5 TABLET ORAL at 09:50

## 2022-07-30 RX ADMIN — THERA TABS 1 TABLET: TAB at 09:50

## 2022-07-30 RX ADMIN — PANTOPRAZOLE SODIUM 40 MG: 40 TABLET, DELAYED RELEASE ORAL at 09:50

## 2022-07-30 RX ADMIN — GABAPENTIN 1200 MG: 300 CAPSULE ORAL at 23:54

## 2022-07-30 RX ADMIN — OXYCODONE HYDROCHLORIDE 10 MG: 5 TABLET ORAL at 23:54

## 2022-07-30 RX ADMIN — LIDOCAINE 3 PATCH: 246 PATCH TOPICAL at 09:46

## 2022-07-30 RX ADMIN — ACETAMINOPHEN 1000 MG: 500 TABLET ORAL at 14:17

## 2022-07-30 RX ADMIN — ACETAMINOPHEN 650 MG: 325 TABLET ORAL at 22:15

## 2022-07-30 RX ADMIN — OXYCODONE HYDROCHLORIDE 10 MG: 5 TABLET ORAL at 00:52

## 2022-07-30 RX ADMIN — THIAMINE HCL TAB 100 MG 100 MG: 100 TAB at 09:50

## 2022-07-30 RX ADMIN — OXYCODONE HYDROCHLORIDE 10 MG: 5 TABLET ORAL at 09:51

## 2022-07-30 RX ADMIN — GABAPENTIN 1200 MG: 300 CAPSULE ORAL at 15:57

## 2022-07-30 RX ADMIN — ACETAMINOPHEN 1000 MG: 500 TABLET ORAL at 09:51

## 2022-07-30 RX ADMIN — GABAPENTIN 1200 MG: 300 CAPSULE ORAL at 09:50

## 2022-07-30 RX ADMIN — FOLIC ACID 1 MG: 1 TABLET ORAL at 09:50

## 2022-07-30 RX ADMIN — GABAPENTIN 1200 MG: 300 CAPSULE ORAL at 00:43

## 2022-07-30 NOTE — SIGNIFICANT EVENT
Significant Event Note    Time of event: 5:20 PM July 30, 2022    Description of event:  Patient admitted for hip pain.  Tylenol scheduled by pain team.  Liver function results done today showed elevation in enzymes.  Unclear if this is known to pain team.  Change Tylenol to as needed for tonight.  PT to eval in the a.m.  Primary team to see for any work-up for elevated LFTs in a.m.    Plan:  Above    Discussed with: bedside nurse    Cam Ansari MD

## 2022-07-30 NOTE — PROGRESS NOTES
[facility]    Medicine Progress Note - Hospitalist Service    Date of Admission:  7/27/2022    Assessment & Plan                     Principal Problem:    Hip pain, right  Active Problems:    Cervicalgia    Asthma    Hepatic steatosis    Chronic low back pain    Depression    Fibromyalgia    HTN (hypertension)    Obesity    Alcohol use disorder, severe, dependence (H)          Plan : need eval for snif until her upcoming rt yemi as o/p     Will adjust pain meds up                   Diet: Regular Diet Adult    DVT Prophylaxis: Pneumatic Compression Devices  Valdez Catheter: Not present  Central Lines: None  Cardiac Monitoring: None  Code Status: Full Code      Disposition Plan      Expected Discharge Date: 07/31/2022    Discharge Delays: Placement - TCU            The patient's care was discussed with the Attending Physician, Dr. ANDERSON.    Luis Fernando Anderson MD  Hospitalist Service  [facility]  Securely message with the Vocera Web Console (learn more here)  Text page via [employer] Paging/Directory         Clinically Significant Risk Factors Present on Admission                          ______________________________________________________________________    Interval History   {Include any events overnight / new information. 4 point ROS is needed for billing a level III     Data reviewed today: I reviewed all medications, new labs and imaging results over the last 24 hours. I personally reviewed no images or EKG's today.    Physical Exam   Vital Signs: Temp: 98.1  F (36.7  C) Temp src: Oral BP: (!) 164/74 (RN notified) Pulse: 86   Resp: 18 SpO2: 95 % O2 Device: Nasal cannula Oxygen Delivery: 1 LPM  Weight: 140 lbs 0 oz  General Appearance: ALERT ,PAIN CONTROLLED BETTER RTODAY  Respiratory: CLEAR  Cardiovascular: RRR  GI: SOFT  Skin: NO RASH  Other: NO BACK TENDERNESS    Data   Recent Labs   Lab 07/30/22  1151 07/27/22  2145   WBC  --  10.9   HGB  --  13.2   MCV  --  110*    PLT  --  321   NA  --  139   POTASSIUM  --  4.4   CHLORIDE  --  105   CO2  --  22   BUN  --  21   CR  --  0.95   ANIONGAP  --  12   WILSON  --  10.1   GLC  --  96   ALBUMIN 3.4*  --    PROTTOTAL 7.5  --    BILITOTAL 0.7  --    ALKPHOS 387*  --    *  --    *  --

## 2022-07-30 NOTE — PROGRESS NOTES
"[facility] ACUTE PAIN SERVICE    ([facility], [facility], [facility])  Daily PAIN Progress Note    Assessment/Plan:  Rachel De Jesus is a 56 year old female who was admitted on 7/27/2022.  I was asked to see the patient for hip pain. Admitted for hip pain. History of pain in the right hip for >1 year, HTN, EToh use disorder. Pain controlled with po medications, ortho plans for hip surgery outpatient.      PLAN:   1) Pain is consistent with chronic hip pain avascular necrosis..   2)Multimodal Medication Therapy  Topical:  Lidocaine patch 4% x 3   Adjuvants:  Gabapentin Increase to 1200 3 times daily, APAP TID (heaptic panel today), given bleeding history would avoid NSAIDs, duloxetine 120 mg at at bedtime  Opioids:  [id number] increased from q12h prn to q6h PRN per HMS    3)Non-medication interventions- Ice, Heat, physical therapy  4)Constipation Prophylaxis- senna and miralax PRN   5) Follow up -TBD pending orthopedics consult, patient will certainly require TCU-cannot provide opioids unless in a controlled setting such as TCU.        Subjective:    Patient who notes that she continues to have right-sided hip pain.  Unable to lift her leg.  She states, \"I will not leave until I get the surgery\".  Her pain is controlled on oxycodone.  I talked with her regarding my concern of taking oxycodone given new sobriety.  She states she does not want to go to TCU and would prefer to have surgery.           Hip pain, right   Patient Active Problem List   Diagnosis     Enthesopathy of hip region     Backache     Cervicalgia     Melena     C3 cervical fracture (H)     ARJUN (acute kidney injury) (H)     Hyperkalemia     Hyponatremia     Abnormal LFTs     Asthma     Hepatic steatosis     Chronic low back pain     Depression     Fibromyalgia     HTN (hypertension)     Obesity     Acute kidney injury (H)     Closed fracture of transverse process of cervical vertebra, initial encounter (H)     Alcohol use " "disorder, severe, dependence (H)     Hip pain, right        History   Drug Use Not on file         Tobacco Use      Smoking status: Former Smoker        Types: Cigarettes        Quit date: 2009        Years since quittin.8      Smokeless tobacco: Never Used          acetaminophen  1,000 mg Oral TID     amLODIPine  5 mg Oral Daily     DULoxetine  120 mg Oral At Bedtime     folic acid  1 mg Oral Daily     gabapentin  1,200 mg Oral Q8H     lidocaine  3 patch Transdermal Q24H     lidocaine   Transdermal Q8H YULISA     multivitamin, therapeutic  1 tablet Oral Daily     pantoprazole  40 mg Oral Daily     thiamine  100 mg Oral Daily     vitamin D2  50,000 Units Oral Q7 Days       Objective:  Vital signs in last 24 hours:  B/P: 162/77[RN notified[, T: 98.1, P: 78, R: 18   Blood pressure (!) 162/77, pulse 78, temperature 98.1  F (36.7  C), temperature source Oral, resp. rate 18, height 1.6 m (5' 3\"), weight 63.5 kg (140 lb), SpO2 94 %, not currently breastfeeding.      Weight:   Wt Readings from Last 2 Encounters:   22 63.5 kg (140 lb)   07/15/22 63.5 kg (140 lb)           Intake/Output:    Intake/Output Summary (Last 24 hours) at 2022 1121  Last data filed at 2022 0853  Gross per 24 hour   Intake 840 ml   Output 3200 ml   Net -2360 ml        Review of Systems:   As per subjective, all others negative.    Physical Exam:     General Appearance:  Alert, cooperative, no distress, appears stated age   Patient is sitting up in bed    Head:  Normocephalic, without obvious abnormality, atraumatic   Eyes:  PERRL, conjunctiva/corneas clear, EOM's intact   ENT/Throat: Lips normal     Lymph/Neck: Supple, symmetrical, trachea midline, no adenopathy, thyroid: not enlarged, symmetric    Lungs:    , respirations unlabored   Chest Wall:  No tenderness or deformity   Cardiovascular/Heart:  Regular rate and rhythm, S1, S2 normal,no murmur, rub or gallop.     Abdomen:   Soft, non-tender, bowel sounds active all four " quadrants,  no masses, no organomegaly   Musculoskeletal: Right hip limited ROM   Skin: Skin color tan   Neurologic: Alert and oriented X 3, Moves all 4 extremities             Imaging:  Personally Reviewed.  No images are attached to the encounter.     Lab Results:  Personally Reviewed.   Last Comprehensive Metabolic Panel:  Sodium   Date Value Ref Range Status   07/27/2022 139 136 - 145 mmol/L Final     Potassium   Date Value Ref Range Status   07/27/2022 4.4 3.5 - 5.0 mmol/L Final     Chloride   Date Value Ref Range Status   07/27/2022 105 98 - 107 mmol/L Final     Carbon Dioxide (CO2)   Date Value Ref Range Status   07/27/2022 22 22 - 31 mmol/L Final     Anion Gap   Date Value Ref Range Status   07/27/2022 12 5 - 18 mmol/L Final     Glucose   Date Value Ref Range Status   07/27/2022 96 70 - 125 mg/dL Final     Urea Nitrogen   Date Value Ref Range Status   07/27/2022 21 8 - 22 mg/dL Final     Creatinine   Date Value Ref Range Status   07/27/2022 0.95 0.60 - 1.10 mg/dL Final     GFR Estimate   Date Value Ref Range Status   07/27/2022 69 >60 mL/min/1.73m2 Final     Comment:     Effective December 21, 2021 eGFRcr in adults is calculated using the 2021 CKD-EPI creatinine equation which includes age and gender (Jackson et al., NEJ, DOI: 10.1056/RZXRjf9054359)   06/21/2019 >60 >60 mL/min/1.73m2 Final     Calcium   Date Value Ref Range Status   07/27/2022 10.1 8.5 - 10.5 mg/dL Final        UA: No results found for: UAMP, UBARB, BENZODIAZEUR, UCANN, UCOC, OPIT, UPCP         Total time spent 26 minutes with greater than 50% in consultation, education and coordination of care.              Blossom Ponce APRN, CNS-BC, CNP, ACHPN  Acute Care Pain Management Program   Hours of pain coverage 7a-1700- after 1700 please call the house officer   [facility] ([facility], [facility], JNs)   Page via [employer]- Click HERE to page Blossom or call [phone]

## 2022-07-30 NOTE — PLAN OF CARE
PRIMARY DIAGNOSIS: ACUTE PAIN  OUTPATIENT/OBSERVATION GOALS TO BE MET BEFORE DISCHARGE:  1. Pain Status: Improved-controlled with oral pain medications.    2. Return to near baseline physical activity: No    3. Cleared for discharge by consultants (if involved): N/A    Discharge Planner Nurse   Safe discharge environment identified: No, potentially TCU but Pt does not want to go to TCU, she wants hip replacement  Barriers to discharge: Yes, Pt refusing TCU, wants hip replacement       Entered by: Shashi Jiang RN 07/30/2022 4:07 PM     Elevated liver panel today ALT: 271  AST: 118, Dr. Anderson messaged via [employer] about holding scheduled tylenol. Pt also has PRN 10 mg oxycodone for pain.

## 2022-07-30 NOTE — PLAN OF CARE
PRIMARY DIAGNOSIS: ACUTE PAIN  OUTPATIENT/OBSERVATION GOALS TO BE MET BEFORE DISCHARGE:  1. Pain Status: Improved-controlled with oral pain medications.    2. Return to near baseline physical activity: No    3. Cleared for discharge by consultants (if involved): N/A    Discharge Planner Nurse   Safe discharge environment identified: No  Barriers to discharge: No       Entered by: Claudia Ledesma RN 07/30/2022 5:30 AM     Please review provider order for any additional goals.   Nurse to notify provider when observation goals have been met and patient is ready for discharge.Goal Outcome Evaluation:        Pt had 10 mg oxycodone at 0052  for right hip pain with relief, sleeping upon reassessment.

## 2022-07-30 NOTE — PROGRESS NOTES
I stopped by and met with Adan this morning, who had her daughter on the phone as well.  I also reviewed her images.  She has the sequela of avascular necrosis of her right femoral head with complete flattening of the femoral head, loss of the joint space.  She was admitted with intractable hip pain.    As an outpatient, she had planned to have a total hip performed by an outside orthopedic group, apparently this was canceled however secondary to blood pressure control issues.  Pain control has been difficult more recently, especially following a fall which led to her admission.    She would benefit from a total hip replacement.  I had considered doing this today, but with for other cases of greater urgency, I will be unable to get to it today.  This will definitively not happen over this weekend.  I discussed this with her and her daughter in detail.  I believe that the best and most appropriate course of action would be for her to discharge with follow-up and have a total hip done as an outpatient.  There would be no guarantee of the timing, or availability, surgeon availability to do this if she remains inpatient at this point.  She verbalized understanding of this.    We will continue to follow her peripherally while she remains inpatient.  If she remains inpatient and OR availability and surgeon availability should arise, we could potentially perform the procedure here, but the timing of this would be very uncertain.    Please do not hesitate to contact us with any further questions.    Juma Montiel MD   [facility]  7/30/2022

## 2022-07-30 NOTE — PLAN OF CARE
PRIMARY DIAGNOSIS: ACUTE PAIN  OUTPATIENT/OBSERVATION GOALS TO BE MET BEFORE DISCHARGE:  1. Pain Status: Improved-controlled with oral pain medications.    2. Return to near baseline physical activity: No    3. Cleared for discharge by consultants (if involved): N/A    Discharge Planner Nurse   Safe discharge environment identified: No  Barriers to discharge: Yes, Pt wants to have hip replaced or to be transferred to a different hospital       Entered by: Shashi Jiang RN 07/30/2022 12:05 AM

## 2022-07-31 LAB
CREAT SERPL-MCNC: 0.78 MG/DL (ref 0.6–1.1)
GFR SERPL CREATININE-BSD FRML MDRD: 88 ML/MIN/1.73M2

## 2022-07-31 PROCEDURE — G0378 HOSPITAL OBSERVATION PER HR: HCPCS

## 2022-07-31 PROCEDURE — 250N000013 HC RX MED GY IP 250 OP 250 PS 637: Performed by: INTERNAL MEDICINE

## 2022-07-31 PROCEDURE — 36415 COLL VENOUS BLD VENIPUNCTURE: CPT | Performed by: INTERNAL MEDICINE

## 2022-07-31 PROCEDURE — 82565 ASSAY OF CREATININE: CPT | Performed by: INTERNAL MEDICINE

## 2022-07-31 PROCEDURE — 250N000013 HC RX MED GY IP 250 OP 250 PS 637: Performed by: PAIN MEDICINE

## 2022-07-31 PROCEDURE — 99232 SBSQ HOSP IP/OBS MODERATE 35: CPT | Performed by: INTERNAL MEDICINE

## 2022-07-31 PROCEDURE — 250N000011 HC RX IP 250 OP 636: Performed by: INTERNAL MEDICINE

## 2022-07-31 PROCEDURE — 99213 OFFICE O/P EST LOW 20 MIN: CPT | Performed by: PAIN MEDICINE

## 2022-07-31 RX ADMIN — LIDOCAINE 3 PATCH: 246 PATCH TOPICAL at 08:20

## 2022-07-31 RX ADMIN — FLUTICASONE PROPIONATE 2 SPRAY: 50 SPRAY, METERED NASAL at 21:15

## 2022-07-31 RX ADMIN — THIAMINE HCL TAB 100 MG 100 MG: 100 TAB at 08:21

## 2022-07-31 RX ADMIN — DULOXETINE HYDROCHLORIDE 100 MG: 60 CAPSULE, DELAYED RELEASE PELLETS ORAL at 21:12

## 2022-07-31 RX ADMIN — OXYCODONE HYDROCHLORIDE 10 MG: 5 TABLET ORAL at 19:52

## 2022-07-31 RX ADMIN — OXYCODONE HYDROCHLORIDE 10 MG: 5 TABLET ORAL at 06:34

## 2022-07-31 RX ADMIN — AMLODIPINE BESYLATE 5 MG: 5 TABLET ORAL at 08:21

## 2022-07-31 RX ADMIN — PANTOPRAZOLE SODIUM 40 MG: 40 TABLET, DELAYED RELEASE ORAL at 08:21

## 2022-07-31 RX ADMIN — OXYCODONE HYDROCHLORIDE 10 MG: 5 TABLET ORAL at 13:42

## 2022-07-31 RX ADMIN — GABAPENTIN 1200 MG: 300 CAPSULE ORAL at 16:06

## 2022-07-31 RX ADMIN — FOLIC ACID 1 MG: 1 TABLET ORAL at 08:21

## 2022-07-31 RX ADMIN — GABAPENTIN 1200 MG: 300 CAPSULE ORAL at 08:21

## 2022-07-31 RX ADMIN — THERA TABS 1 TABLET: TAB at 08:21

## 2022-07-31 RX ADMIN — ONDANSETRON 4 MG: 4 TABLET, ORALLY DISINTEGRATING ORAL at 18:27

## 2022-07-31 NOTE — CARE PLAN
PRIMARY DIAGNOSIS: ACUTE PAIN  OUTPATIENT/OBSERVATION GOALS TO BE MET BEFORE DISCHARGE:  1. Pain Status: Improved-controlled with oral pain medications.    2. Return to near baseline physical activity: No    3. Cleared for discharge by consultants (if involved): No    Discharge Planner Nurse   Safe discharge environment identified: No  Barriers to discharge: Yes          Entered by: Annalise Gandara RN 07/31/2022 2:40 PM     Please review provider order for any additional goals.   Nurse to notify provider when observation goals have been met and patient is ready for discharge.    Patient is in need of placement in TCU for safety, mobility assistance, and pain control until her total hip replacement surgery.  She is medically stable but is not safe to go home alone.  She is at too great a risk for more falls.

## 2022-07-31 NOTE — PROGRESS NOTES
[facility] ACUTE PAIN SERVICE    ([facility], [facility], [facility])  Daily PAIN Progress Note    Assessment/Plan:  Rachel De Jesus is a 56 year old female who was admitted on 7/27/2022.  I was asked to see the patient for hip pain. Admitted for hip pain. History of pain in the right hip for >1 year, HTN, EToh use disorder. Pain controlled with po medications, ortho plans for hip surgery outpatient.      PLAN:   1) Pain is consistent with chronic hip pain avascular necrosis.  Given altered liver function would suggest stopping Tylenol as well as weaning Cymbalta..  Cymbalta should be tapered over 2 to 4 weeks although 2 weeks is likely sufficient.  Would recheck liver enzymes.   2)Multimodal Medication Therapy  Topical:  Lidocaine patch 4% x 3   Adjuvants:  Gabapentin Increase to 1200 3 times daily, will stop Tylenol based on hepatic panel given bleeding history would avoid NSAIDs, duloxetine be weaned over 2 weeks as this is not suggested to be used with hepatic dysfunction  Opioids:  [id number] increased from q12h prn to q6h PRN per HMS    3)Non-medication interventions- Ice, Heat, physical therapy  4)Constipation Prophylaxis- senna and miralax PRN   5) Follow up -TBD pending orthopedics consult, patient will certainly require TCU-cannot provide opioids unless in a controlled setting such as TCU.    Wean Cymbalta as follows:  8/1- decrease to 80mg  8/3- decrease to 60mg  8/5- decrease to 40mg  8/8- decrease to 30mg  8/12- decrease to 20mg  8/14- stop      Subjective:      Today I met with the patient who is cheerful and sitting up drinking coffee and watching TV.  We talked about elevated liver enzymes and how Tylenol and Cymbalta can impact that.  I offered the mental health nurse practitioner talk with her about the Cymbalta although she tells me she no longer needs that.  We talked extensively regarding anxiety and depression.  She states that 5 years ago when she got on the  "Cymbalta she was going through a rough patch when she lost her mother, father, and .  She feels that she no longer requires medication for anxiety and depression as she has moved through her grieving process.  Offered 2-week taper and she is amenable to this.  We talked about the fact that other things can increase liver enzymes 2.  She admits that she was taking, \"way too much Tylenol\".  And this was mainly because opioids were not an option for her.  She asked if she could have, \"dialysis\" for her liver.  We talked about the role of dialysis for kidney failure and clarified the role of the liver and how that assists in metabolization.    Hip pain, right   Patient Active Problem List   Diagnosis     Enthesopathy of hip region     Backache     Cervicalgia     Melena     C3 cervical fracture (H)     ARJUN (acute kidney injury) (H)     Hyperkalemia     Hyponatremia     Abnormal LFTs     Asthma     Hepatic steatosis     Chronic low back pain     Depression     Fibromyalgia     HTN (hypertension)     Obesity     Acute kidney injury (H)     Closed fracture of transverse process of cervical vertebra, initial encounter (H)     Alcohol use disorder, severe, dependence (H)     Hip pain, right        History   Drug Use Not on file         Tobacco Use      Smoking status: Former Smoker        Types: Cigarettes        Quit date: 2009        Years since quittin.8      Smokeless tobacco: Never Used          amLODIPine  5 mg Oral Daily     DULoxetine  100 mg Oral At Bedtime     folic acid  1 mg Oral Daily     gabapentin  1,200 mg Oral Q8H     lidocaine  3 patch Transdermal Q24H     lidocaine   Transdermal Q8H YULISA     multivitamin, therapeutic  1 tablet Oral Daily     pantoprazole  40 mg Oral Daily     thiamine  100 mg Oral Daily     vitamin D2  50,000 Units Oral Q7 Days       Objective:  Vital signs in last 24 hours:  B/P: 162/77[RN notified[, T: 98.1, P: 78, R: 18   Blood pressure 137/71, pulse 102, temperature (!) " "96.5  F (35.8  C), temperature source Axillary, resp. rate 20, height 1.6 m (5' 3\"), weight 63.5 kg (140 lb), SpO2 94 %, not currently breastfeeding.      Weight:   Wt Readings from Last 2 Encounters:   07/27/22 63.5 kg (140 lb)   07/15/22 63.5 kg (140 lb)           Intake/Output:    Intake/Output Summary (Last 24 hours) at 7/30/2022 1121  Last data filed at 7/30/2022 0853  Gross per 24 hour   Intake 840 ml   Output 3200 ml   Net -2360 ml        Review of Systems:   As per subjective, all others negative.    Physical Exam:     General Appearance:  Alert, cooperative, no distress, appears stated age   Patient is sitting up in bed    Head:  Normocephalic, without obvious abnormality, atraumatic   Eyes:  PERRL, conjunctiva/corneas clear, EOM's intact   ENT/Throat: Lips normal     Lymph/Neck: Supple, symmetrical, trachea midline, no adenopathy, thyroid: not enlarged, symmetric    Lungs:    , respirations unlabored   Chest Wall:  No tenderness or deformity   Cardiovascular/Heart:  Regular rate and rhythm, S1, S2 normal,no murmur, rub or gallop.     Abdomen:   Soft, non-tender, bowel sounds active all four quadrants,  no masses, no organomegaly   Musculoskeletal: Right hip limited ROM   Skin: Skin color tan   Neurologic: Alert and oriented X 3, Moves all 4 extremities             Imaging:  Personally Reviewed.  No images are attached to the encounter.     Lab Results:  Personally Reviewed.   Last Comprehensive Metabolic Panel:  Sodium   Date Value Ref Range Status   07/27/2022 139 136 - 145 mmol/L Final     Potassium   Date Value Ref Range Status   07/27/2022 4.4 3.5 - 5.0 mmol/L Final     Chloride   Date Value Ref Range Status   07/27/2022 105 98 - 107 mmol/L Final     Carbon Dioxide (CO2)   Date Value Ref Range Status   07/27/2022 22 22 - 31 mmol/L Final     Anion Gap   Date Value Ref Range Status   07/27/2022 12 5 - 18 mmol/L Final     Glucose   Date Value Ref Range Status   07/27/2022 96 70 - 125 mg/dL Final     Urea " Nitrogen   Date Value Ref Range Status   07/27/2022 21 8 - 22 mg/dL Final     Creatinine   Date Value Ref Range Status   07/31/2022 0.78 0.60 - 1.10 mg/dL Final     GFR Estimate   Date Value Ref Range Status   07/31/2022 88 >60 mL/min/1.73m2 Final     Comment:     Effective December 21, 2021 eGFRcr in adults is calculated using the 2021 CKD-EPI creatinine equation which includes age and gender (Jackson et al., NE, DOI: 10.1056/[id number])   06/21/2019 >60 >60 mL/min/1.73m2 Final     Calcium   Date Value Ref Range Status   07/27/2022 10.1 8.5 - 10.5 mg/dL Final        UA: No results found for: UAMP, UBARB, BENZODIAZEUR, UCANN, UCOC, OPIT, UPCP         Total time spent 27 minutes with greater than 50% in consultation, education and coordination of care.              Blossom Ponce APRN, CNS-BC, CNP, ACHPN  Acute Care Pain Management Program   Hours of pain coverage 7a-1700- after 1700 please call the house officer   [facility] ([facility], [place], JNs)   Page via [facility]- Click HERE to page Blossom or call [phone]

## 2022-07-31 NOTE — PLAN OF CARE
Problem: Pain Acute  Goal: Acceptable Pain Control and Functional Ability  Outcome: Ongoing, Progressing  Intervention: Develop Pain Management Plan  Recent Flowsheet Documentation  Taken 7/31/2022 6158 by Annalise Gandara RN  Pain Management Interventions: medication (see MAR)   Goal Outcome Evaluation  Patient has pain in the right hip and consistently rates it between 7 and 9.  She is taking prn Oxycodone, Lidoderm patches, and ice packs which helps.

## 2022-07-31 NOTE — PROGRESS NOTES
Care Management Follow Up    Length of Stay (days): 0    Expected Discharge Date: 08/01/2022     Concerns to be Addressed: Hip Pain and TCU   Patient plan of care discussed at interdisciplinary rounds: Yes    Anticipated Discharge Disposition:  TCU      Anticipated Discharge Services:     Anticipated Discharge DME: TBD     Patient/family educated on Medicare website which has current facility and service quality ratings:  Yes  Education Provided on the Discharge Plan:  Yes   Patient/Family in Agreement with the Plan:  Yes     Referrals Placed by CM/SW:  TCU's   Private pay costs discussed: Yes     Additional Information:  JANY sent referrals to [facility] and [facility].  PAS needed.       KENZIE Ribeiro

## 2022-07-31 NOTE — PLAN OF CARE
PRIMARY DIAGNOSIS: ACUTE PAIN  OUTPATIENT/OBSERVATION GOALS TO BE MET BEFORE DISCHARGE:  1. Pain Status: Improved-controlled with oral pain medications.    2. Return to near baseline physical activity: No    3. Cleared for discharge by consultants (if involved): N/A    Discharge Planner Nurse   Safe discharge environment identified: Yes, TCU or other hospital  Barriers to discharge: Yes, needs placement       Entered by: Shashi Jiang RN 07/31/2022 7:02 AM     Pt slept well last night, having 7/10 pain this morning, 10 mg oxy PRN given.

## 2022-07-31 NOTE — PLAN OF CARE
PRIMARY DIAGNOSIS: ACUTE PAIN  OUTPATIENT/OBSERVATION GOALS TO BE MET BEFORE DISCHARGE:  1. Pain Status: Improved-controlled with oral pain medications.    2. Return to near baseline physical activity: No    3. Cleared for discharge by consultants (if involved): N/A    Discharge Planner Nurse   Safe discharge environment identified: Yes, possibly TCU or another hospital  Barriers to discharge: Yes, needs placement       Entered by: Shashi Jiang RN 07/31/2022 4:33 AM     Pt pain improved with PRN oxy 10 mg   Metronidazole Counseling:  I discussed with the patient the risks of metronidazole including but not limited to seizures, nausea/vomiting, a metallic taste in the mouth, nausea/vomiting and severe allergy.

## 2022-07-31 NOTE — CARE PLAN
Patient vital signs are at baseline: Yes  Patient able to ambulate as they were prior to admission or with assist devices provided by therapies during their stay:  No,  Reason:   Patient is having a lot of right hip pain and decreased mobility because of it.  Patient MUST void prior to discharge:  Yes  Patient able to tolerate oral intake:  Yes  Pain has adequate pain control using Oral analgesics:  Yes  Does patient have an identified :  No,  Reason:  She will be going to TCU prior to hip surgery.  Has goal D/C date and time been discussed with patient:  No,  Reason:  TCU has not been found yet.

## 2022-07-31 NOTE — PROGRESS NOTES
[facility]    Medicine Progress Note - Hospitalist Service    Date of Admission:  7/27/2022    Assessment & Plan                                Principal Problem:    Hip pain, right  Active Problems:    Cervicalgia    Asthma    Hepatic steatosis    Chronic low back pain    Depression    Fibromyalgia    HTN (hypertension)    Obesity    Alcohol use disorder, severe, dependence (H)          Plan : need eval for snif until her upcoming rt yemi as o/p     Will adjust pain meds up                     Diet: Regular Diet Adult    DVT Prophylaxis: Pneumatic Compression Devices  Valdez Catheter: Not present  Central Lines: None  Cardiac Monitoring: None  Code Status: Full Code      Disposition Plan      Expected Discharge Date: 08/01/2022    Discharge Delays: Placement - TCU            The patient's care was discussed with the Attending Physician, Dr. Anderson.    Luis Fernando Anderson MD  Hospitalist Service  [facility]  Securely message with the Vocera Web Console (learn more here)  Text page via [employer] Paging/Directory         Clinically Significant Risk Factors Present on Admission                          ______________________________________________________________________    Interval History   {Include any events overnight / new information. 4 point ROS is needed for billing a level III (233):    Data reviewed today: I reviewed all medications, new labs and imaging results over the last 24 hours. I personally reviewed no images or EKG's today.    Physical Exam   Vital Signs: Temp: 98.5  F (36.9  C) Temp src: Oral BP: 132/77 Pulse: 87   Resp: 20 SpO2: 90 % O2 Device: None (Room air)    Weight: 140 lbs 0 oz  General Appearance: Alert, pain better controlled now   Respiratory: clear to auscultation bilaterally  Cardiovascular: RRR  GI: soft ,no masses  Skin: worm ,dry no rashes  Other: No cv angle tenderness    Data   Recent Labs   Lab 07/31/22  0517 07/30/22  1151 07/27/22  3876    WBC  --   --  10.9   HGB  --   --  13.2   MCV  --   --  110*   PLT  --   --  321   NA  --   --  139   POTASSIUM  --   --  4.4   CHLORIDE  --   --  105   CO2  --   --  22   BUN  --   --  21   CR 0.78  --  0.95   ANIONGAP  --   --  12   WILSON  --   --  10.1   GLC  --   --  96   ALBUMIN  --  3.4*  --    PROTTOTAL  --  7.5  --    BILITOTAL  --  0.7  --    ALKPHOS  --  387*  --    ALT  --  271*  --    AST  --  118*  --

## 2022-08-01 PROBLEM — M25.551 HIP PAIN, RIGHT: Status: RESOLVED | Noted: 2022-07-27 | Resolved: 2022-08-01

## 2022-08-01 PROBLEM — M16.11 PRIMARY OSTEOARTHRITIS OF RIGHT HIP: Status: ACTIVE | Noted: 2022-08-01

## 2022-08-01 PROCEDURE — 250N000013 HC RX MED GY IP 250 OP 250 PS 637: Performed by: PAIN MEDICINE

## 2022-08-01 PROCEDURE — 99224 PR SUBSEQUENT OBSERVATION CARE,LEVEL I: CPT | Performed by: INTERNAL MEDICINE

## 2022-08-01 PROCEDURE — 250N000013 HC RX MED GY IP 250 OP 250 PS 637: Performed by: INTERNAL MEDICINE

## 2022-08-01 PROCEDURE — G0378 HOSPITAL OBSERVATION PER HR: HCPCS

## 2022-08-01 PROCEDURE — 99213 OFFICE O/P EST LOW 20 MIN: CPT | Performed by: PAIN MEDICINE

## 2022-08-01 RX ADMIN — Medication 3 MG: at 00:53

## 2022-08-01 RX ADMIN — GABAPENTIN 1200 MG: 300 CAPSULE ORAL at 09:30

## 2022-08-01 RX ADMIN — GABAPENTIN 1200 MG: 300 CAPSULE ORAL at 16:05

## 2022-08-01 RX ADMIN — OXYCODONE HYDROCHLORIDE 10 MG: 5 TABLET ORAL at 02:26

## 2022-08-01 RX ADMIN — FOLIC ACID 1 MG: 1 TABLET ORAL at 09:29

## 2022-08-01 RX ADMIN — OXYCODONE HYDROCHLORIDE 10 MG: 5 TABLET ORAL at 22:10

## 2022-08-01 RX ADMIN — DULOXETINE HYDROCHLORIDE 100 MG: 60 CAPSULE, DELAYED RELEASE PELLETS ORAL at 22:11

## 2022-08-01 RX ADMIN — PANTOPRAZOLE SODIUM 40 MG: 40 TABLET, DELAYED RELEASE ORAL at 09:29

## 2022-08-01 RX ADMIN — OXYCODONE HYDROCHLORIDE 10 MG: 5 TABLET ORAL at 09:10

## 2022-08-01 RX ADMIN — OXYCODONE HYDROCHLORIDE 10 MG: 5 TABLET ORAL at 16:05

## 2022-08-01 RX ADMIN — AMLODIPINE BESYLATE 5 MG: 5 TABLET ORAL at 09:29

## 2022-08-01 RX ADMIN — THIAMINE HCL TAB 100 MG 100 MG: 100 TAB at 09:29

## 2022-08-01 RX ADMIN — THERA TABS 1 TABLET: TAB at 09:29

## 2022-08-01 RX ADMIN — GABAPENTIN 1200 MG: 300 CAPSULE ORAL at 00:48

## 2022-08-01 NOTE — DISCHARGE INSTRUCTIONS
Wean Cymbalta as follows:  - Take Cymbalta 40 mg 8/8 - 8/9  - Take Cymbalta 30 mg 8/10 - 8/13  - Take Cymbalta 20 mg 8/14 - 8/15 and then stop      Home care services have been arranged for you.  [facility]  Services: PT and OT  Phone: [phone]  Instructions: Home Care will contact you within 24 hours to arrange the first visit.

## 2022-08-01 NOTE — PLAN OF CARE
Problem: Plan of Care - These are the overarching goals to be used throughout the patient stay.    Goal: Optimal Comfort and Wellbeing  Outcome: Ongoing, Progressing     Pt in pain overnight, 8/10 hand and hip pain. Oxycodone given PRN  A/O 4x. Makes needs known. Slept comfortably after receiving pain meds. Waiting for placement.

## 2022-08-01 NOTE — PROGRESS NOTES
[facility] ACUTE PAIN SERVICE    ([facility], [facility], [facility])  Daily PAIN Progress Note    Assessment/Plan:  Rachel De Jesus is a 56 year old female who was admitted on 7/27/2022.  I was asked to see the patient for hip pain. Admitted for hip pain. History of pain in the right hip for >1 year, HTN, EToh use disorder. Pain controlled with po medications, ortho plans for hip surgery outpatient.  X-rays revealed severe osteoarthritis of the right hip with a shallow acetabulum and flattening femoral head without fracture or dislocation. Utilized 30mg oxycodone in the last 24 hours. Pain team will sign off.     PLAN:   1) Pain is consistent with chronic hip pain avascular necrosis.  Given altered liver function would suggest stopping Tylenol as well as weaning Cymbalta..  Cymbalta should be tapered over 2 to 4 weeks although 2 weeks is likely sufficient.  Would recheck liver enzymes before discharge.   2)Multimodal Medication Therapy  Topical:  Lidocaine patch 4% x 3   Adjuvants:  Gabapentin 1200 3 times daily, will stop Tylenol based on hepatic panel given bleeding history would avoid NSAIDs, duloxetine be weaned over 2 weeks as this is not suggested to be used with hepatic dysfunction  Opioids:  [id number] Q6h PRN   3)Non-medication interventions- Ice, Heat, physical therapy  4)Constipation Prophylaxis- senna and miralax PRN   5) Follow up -TBD pending orthopedics consult, patient will certainly require TCU-cannot provide opioids unless in a controlled setting such as TCU.    Wean Cymbalta as follows:  8/1- decrease to 80mg  8/3- decrease to 60mg  8/5- decrease to 40mg  8/8- decrease to 30mg  8/12- decrease to 20mg  8/14- stop      Subjective:    I met with Rachel at the bedside. We talked about pain management. Provided education on use of opioids, storage, disposal, and overdose risk.  We talked about appointments for follow up. Reviewed cymbalta taper.           Hip pain, right  "  Patient Active Problem List   Diagnosis     Enthesopathy of hip region     Backache     Cervicalgia     Melena     C3 cervical fracture (H)     ARJUN (acute kidney injury) (H)     Hyperkalemia     Hyponatremia     Abnormal LFTs     Asthma     Hepatic steatosis     Chronic low back pain     Depression     Fibromyalgia     HTN (hypertension)     Obesity     Acute kidney injury (H)     Closed fracture of transverse process of cervical vertebra, initial encounter (H)     Alcohol use disorder, severe, dependence (H)     Hip pain, right        History   Drug Use Not on file         Tobacco Use      Smoking status: Former Smoker        Types: Cigarettes        Quit date: 2009        Years since quittin.8      Smokeless tobacco: Never Used          amLODIPine  5 mg Oral Daily     DULoxetine  100 mg Oral At Bedtime     folic acid  1 mg Oral Daily     gabapentin  1,200 mg Oral Q8H     lidocaine  3 patch Transdermal Q24H     lidocaine   Transdermal Q8H YULISA     multivitamin, therapeutic  1 tablet Oral Daily     pantoprazole  40 mg Oral Daily     thiamine  100 mg Oral Daily     vitamin D2  50,000 Units Oral Q7 Days       Objective:  Vital signs in last 24 hours:  B/P: 162/77[RN notified[, T: 98.1, P: 78, R: 18   Blood pressure (!) 141/78, pulse 98, temperature 97.9  F (36.6  C), temperature source Oral, resp. rate 18, height 1.6 m (5' 3\"), weight 63.5 kg (140 lb), SpO2 90 %, not currently breastfeeding.      Weight:   Wt Readings from Last 2 Encounters:   22 63.5 kg (140 lb)   07/15/22 63.5 kg (140 lb)           Intake/Output:    Intake/Output Summary (Last 24 hours) at 2022 1121  Last data filed at 2022 0853  Gross per 24 hour   Intake 840 ml   Output 3200 ml   Net -2360 ml        Review of Systems:   As per subjective, all others negative.    Physical Exam:     General Appearance:  Alert, cooperative, no distress, appears stated age   Patient is sitting up in bed - eating    Head:  Normocephalic, " without obvious abnormality, atraumatic   Eyes:  PERRL, conjunctiva/corneas clear, EOM's intact   ENT/Throat: Lips normal     Lymph/Neck: Supple, symmetrical, trachea midline, no adenopathy, thyroid: not enlarged, symmetric    Lungs:    , respirations unlabored   Chest Wall:  No tenderness or deformity   Cardiovascular/Heart:  Regular rate and rhythm, S1, S2 normal,no murmur, rub or gallop.     Abdomen:   Soft, non-tender, bowel sounds active all four quadrants,  no masses, no organomegaly   Musculoskeletal: Right hip limited ROM   Skin: Skin color tan   Neurologic: Alert and oriented X 3, Moves all 4 extremities             Imaging:  Personally Reviewed.  No images are attached to the encounter.     Lab Results:  Personally Reviewed.   Last Comprehensive Metabolic Panel:  Sodium   Date Value Ref Range Status   07/27/2022 139 136 - 145 mmol/L Final     Potassium   Date Value Ref Range Status   07/27/2022 4.4 3.5 - 5.0 mmol/L Final     Chloride   Date Value Ref Range Status   07/27/2022 105 98 - 107 mmol/L Final     Carbon Dioxide (CO2)   Date Value Ref Range Status   07/27/2022 22 22 - 31 mmol/L Final     Anion Gap   Date Value Ref Range Status   07/27/2022 12 5 - 18 mmol/L Final     Glucose   Date Value Ref Range Status   07/27/2022 96 70 - 125 mg/dL Final     Urea Nitrogen   Date Value Ref Range Status   07/27/2022 21 8 - 22 mg/dL Final     Creatinine   Date Value Ref Range Status   07/31/2022 0.78 0.60 - 1.10 mg/dL Final     GFR Estimate   Date Value Ref Range Status   07/31/2022 88 >60 mL/min/1.73m2 Final     Comment:     Effective December 21, 2021 eGFRcr in adults is calculated using the 2021 CKD-EPI creatinine equation which includes age and gender (Jackson clay al., NEJM, DOI: 10.1056/TWJSrn4772272)   06/21/2019 >60 >60 mL/min/1.73m2 Final     Calcium   Date Value Ref Range Status   07/27/2022 10.1 8.5 - 10.5 mg/dL Final        UA: No results found for: UAMP, UBARB, BENZODIAZEUR, UCANN, UCOC, OPIT, UPCP          Total time spent 28 minutes with greater than 50% in consultation, education and coordination of care.              Blossom Ponce APRN, CNS-BC, CNP, ACHPN  Acute Care Pain Management Program   Hours of pain coverage 7a-1700- after 1700 please call the house officer   [facility] (WW, Joes, JNs)   Page via [facility]- Click HERE to page Blossom or call [phone]

## 2022-08-01 NOTE — PROGRESS NOTES
[facility]    Medicine Progress Note - Hospitalist Service    Date of Admission:  7/27/2022    Assessment & Plan        Principal Problem:    Severe Osteoarthritis of right hip   -- seen by Oumar Cordon, elective PETRA (not during this hospitalization    Active Problems:    Cervicalgia      Asthma      Hepatic steatosis      Chronic low back pain      Depression      Fibromyalgia      HTN (hypertension)      Alcohol use disorder, severe, dependence    -- no alcohol use for 4 weeks.        Diet: Regular Diet Adult    DVT Prophylaxis: Pneumatic Compression Devices  Valdez Catheter: Not present  Central Lines: None  Cardiac Monitoring: None  Code Status: Full Code      Disposition Plan -- awaiting TCU      Expected Discharge Date: 08/01/2022    Discharge Delays: Placement - TCU              Uriel Quintana MD  Hospitalist Service  [facility]  Securely message with the Vocera Web Console (learn more here)  Text page via [employer] Paging/Directory       ______________________________________________________________________    Interval History   Would like hip surgery sooner than later, but [facility] not planning PETRA this admission.     Physical Exam   Vital Signs: Temp: 98.5  F (36.9  C) Temp src: Oral BP: (!) 149/77 Pulse: 90   Resp: 20 SpO2: 91 % O2 Device: None (Room air)    Weight: 140 lbs 0 oz  General Appearance: Alert, pain better controlled now   Respiratory: clear to auscultation bilaterally  Cardiovascular: RRR  GI: soft ,no masses  Skin: worm ,dry no rashes  Other: No cv angle tenderness  Neuro: alert, Ox3  Extrem: splint on left wrist     Data   Recent Labs   Lab 07/31/22  0517 07/30/22  1151 07/27/22  2145   WBC  --   --  10.9   HGB  --   --  13.2   MCV  --   --  110*   PLT  --   --  321   NA  --   --  139   POTASSIUM  --   --  4.4   CHLORIDE  --   --  105   CO2  --   --  22   BUN  --   --  21   CR 0.78  --  0.95   ANIONGAP  --   --  12   WILSON  --   --   10.1   GLC  --   --  96   ALBUMIN  --  3.4*  --    PROTTOTAL  --  7.5  --    BILITOTAL  --  0.7  --    ALKPHOS  --  387*  --    ALT  --  271*  --    AST  --  118*  --

## 2022-08-01 NOTE — PROGRESS NOTES
"Orthopedic Progress Note      Assessment:    Chronic right hip pain, severe osteoarthritis     Plan:   -No acute surgical intervention  -Conservative treatment  -WBAT RLE  -PT/OT  -Pain management per hospital team  -Recommend outpatient follow-up with [facility]      Subjective:  Pain: mild-moderate  Neuro:  Patient denies new onset numbness or paresthesias    Patient reports pain maintained to stay the same.  Patient reports difficulty working with therapy due to hip pain. Patient spoke to Dr. Montiel 07/30/22([facility]), who recommended discharge with follow-up and have a total hip done as an outpatient and that there would be no guarantee of the timing, or availability, surgeon availability to do this if she remains inpatient. Patient verbally understands however willing to wait inpatient until she has a plan in place for surgery.     Objective:  /82 (BP Location: Right arm)   Pulse 89   Temp 98  F (36.7  C) (Oral)   Resp 18   Ht 1.6 m (5' 3\")   Wt 63.5 kg (140 lb)   SpO2 92%   BMI 24.80 kg/m    General: On examination, the patient is awake and alert and oriented to general circumstances   SKIN: There is no evidence of ecchymosis, warmth, erythema, edema, deformity, break to the skin, open wound.  Pulses:  dorsalis pedis pulse is intact  Sensation: L4-S1 intact  Tenderness: NTTP femur, knee, tibia, fibula, ankle joint, foot.  ROM: Limited hip range of motion due to pain.  AROM knee 0 to 45 degrees with pain.    Pertinent Labs   Lab Results: personally reviewed.   Lab Results   Component Value Date    INR 0.93 06/28/2022    INR 0.91 06/21/2019     Lab Results   Component Value Date    WBC 10.9 07/27/2022    HGB 13.2 07/27/2022    HCT 40.0 07/27/2022     (H) 07/27/2022     07/27/2022     Lab Results   Component Value Date     07/27/2022    CO2 22 07/27/2022         Report completed by:  Jackie Tucker PA-C, [place]      "

## 2022-08-01 NOTE — PLAN OF CARE
Goal Outcome Evaluation:      Problem: Pain Acute  Goal: Acceptable Pain Control and Functional Ability  Outcome: Ongoing, Progressing  Intervention: Prevent or Manage Pain  Recent Flowsheet Documentation  Taken 7/31/2022 2000 by Leanna Carrillo RN  Medication Review/Management: medications reviewed  Taken 7/31/2022 1600 by Leanna Carrillo RN  Medication Review/Management: medications reviewed     Problem: Plan of Care - These are the overarching goals to be used throughout the patient stay.    Goal: Absence of Hospital-Acquired Illness or Injury  Intervention: Prevent Skin Injury  Recent Flowsheet Documentation  Taken 7/31/2022 2000 by Leanna Carrillo RN  Body Position:   supine   heels elevated   position changed independently  Taken 7/31/2022 1600 by Leanna Carrillo RN  Body Position: (Assisted patient to shift weight in bed.)   supine   heels elevated   PRIMARY DIAGNOSIS: ACUTE PAIN  OUTPATIENT/OBSERVATION GOALS TO BE MET BEFORE DISCHARGE:  1. Pain Status: Improved-controlled with oral pain medications.    2. Return to near baseline physical activity: No    3. Cleared for discharge by consultants (if involved): No    Discharge Planner Nurse   Safe discharge environment identified: Yes  Barriers to discharge: Yes; Awaiting available TCU bed       Entered by: Leanna Carrillo RN 07/31/2022 10:07 PM     Please review provider order for any additional goals.   Nurse to notify provider when observation goals have been met and patient is ready for discharge.    Patient continues to have very intense, stabbing pain in her right hip and groin with any activity.  Gabapentin and Oxycodone help somewhat, but activity is severely limited by patient's discomfort.

## 2022-08-01 NOTE — CARE PLAN
PRIMARY DIAGNOSIS: ACUTE PAIN  OUTPATIENT/OBSERVATION GOALS TO BE MET BEFORE DISCHARGE:  1. Pain Status: Improved-controlled with oral pain medications.    2. Return to near baseline physical activity: No    3. Cleared for discharge by consultants (if involved): No    Discharge Planner Nurse   Safe discharge environment identified: Yes  Barriers to discharge: Yes, waiting for available TCU bed.       Entered by: Leanna Carrillo RN 07/31/2022 9:47 PM     Please review provider order for any additional goals.   Nurse to notify provider when observation goals have been met and patient is ready for discharge.

## 2022-08-01 NOTE — PLAN OF CARE
Goal Outcome Evaluation:    Patient is A/O x4, VS stable. L arm is wrapped and ANTHONY. Pt denies any tingling or numbness in L arm. No swelling noted. However patient has chronic numbness and tingling in R leg from hip to toes. Bilateral pedal pulse equal. Patient has been struggling to control chronic pain in R hip all day. Oxycodone; gabapentin and ice packs given. She declined lidocaine patch and likes the ice packs. RN educated on ice use. She is assist of 2 with position change. Has external [place] and brief. Patient is eager for hip surgery.

## 2022-08-02 ENCOUNTER — APPOINTMENT (OUTPATIENT)
Dept: OCCUPATIONAL THERAPY | Facility: HOSPITAL | Age: 57
End: 2022-08-02
Payer: COMMERCIAL

## 2022-08-02 PROCEDURE — 250N000011 HC RX IP 250 OP 636: Performed by: HOSPITALIST

## 2022-08-02 PROCEDURE — 250N000013 HC RX MED GY IP 250 OP 250 PS 637: Performed by: INTERNAL MEDICINE

## 2022-08-02 PROCEDURE — 99225 PR SUBSEQUENT OBSERVATION CARE,LEVEL II: CPT | Performed by: HOSPITALIST

## 2022-08-02 PROCEDURE — G0378 HOSPITAL OBSERVATION PER HR: HCPCS

## 2022-08-02 PROCEDURE — 96372 THER/PROPH/DIAG INJ SC/IM: CPT | Performed by: HOSPITALIST

## 2022-08-02 PROCEDURE — 97535 SELF CARE MNGMENT TRAINING: CPT | Mod: GO

## 2022-08-02 PROCEDURE — 250N000013 HC RX MED GY IP 250 OP 250 PS 637: Performed by: PAIN MEDICINE

## 2022-08-02 PROCEDURE — 250N000011 HC RX IP 250 OP 636: Performed by: INTERNAL MEDICINE

## 2022-08-02 RX ORDER — ENOXAPARIN SODIUM 100 MG/ML
40 INJECTION SUBCUTANEOUS EVERY 24 HOURS
Status: DISCONTINUED | OUTPATIENT
Start: 2022-08-02 | End: 2022-08-08 | Stop reason: HOSPADM

## 2022-08-02 RX ADMIN — THERA TABS 1 TABLET: TAB at 08:04

## 2022-08-02 RX ADMIN — OXYCODONE HYDROCHLORIDE 10 MG: 5 TABLET ORAL at 22:19

## 2022-08-02 RX ADMIN — DULOXETINE HYDROCHLORIDE 100 MG: 60 CAPSULE, DELAYED RELEASE PELLETS ORAL at 22:19

## 2022-08-02 RX ADMIN — FOLIC ACID 1 MG: 1 TABLET ORAL at 08:04

## 2022-08-02 RX ADMIN — THIAMINE HCL TAB 100 MG 100 MG: 100 TAB at 08:04

## 2022-08-02 RX ADMIN — OXYCODONE HYDROCHLORIDE 10 MG: 5 TABLET ORAL at 09:51

## 2022-08-02 RX ADMIN — GABAPENTIN 1200 MG: 300 CAPSULE ORAL at 00:20

## 2022-08-02 RX ADMIN — PANTOPRAZOLE SODIUM 40 MG: 40 TABLET, DELAYED RELEASE ORAL at 08:04

## 2022-08-02 RX ADMIN — OXYCODONE HYDROCHLORIDE 10 MG: 5 TABLET ORAL at 05:13

## 2022-08-02 RX ADMIN — ENOXAPARIN SODIUM 40 MG: 40 INJECTION SUBCUTANEOUS at 16:18

## 2022-08-02 RX ADMIN — GABAPENTIN 1200 MG: 300 CAPSULE ORAL at 16:18

## 2022-08-02 RX ADMIN — GABAPENTIN 1200 MG: 300 CAPSULE ORAL at 08:04

## 2022-08-02 RX ADMIN — GABAPENTIN 1200 MG: 300 CAPSULE ORAL at 23:55

## 2022-08-02 RX ADMIN — OXYCODONE HYDROCHLORIDE 10 MG: 5 TABLET ORAL at 16:18

## 2022-08-02 RX ADMIN — ONDANSETRON 4 MG: 4 TABLET, ORALLY DISINTEGRATING ORAL at 08:03

## 2022-08-02 RX ADMIN — AMLODIPINE BESYLATE 5 MG: 5 TABLET ORAL at 08:04

## 2022-08-02 NOTE — PROGRESS NOTES
Care Management Follow Up    Length of Stay (days): 0    Expected Discharge Date: 08/03/2022     Concerns to be Addressed:     TCU Placement  Patient plan of care discussed at interdisciplinary rounds: Yes    Anticipated Discharge Disposition:  TCU     Anticipated Discharge Services:  Ongoing therapies and nursing care  Anticipated Discharge DME:  Per TCU    Patient/family educated on Medicare website which has current facility and service quality ratings:  Yes  Education Provided on the Discharge Plan:  Per team  Patient/Family in Agreement with the Plan:  Yes    Referrals Placed by CM/SW:  See below  Private pay costs discussed: transportation costs    Additional Information:  SW reviewed chart and spoke with interdisciplinary team. Per team, pt is medically cleared for discharge to TCU when placement found. PT recommending TCU.     Per prior SW notes, pt lives alone in a condo without services and independent at baseline with use of walker for long distances.     SW followed-up on TCU referrals below and awaiting responses. Pt agreeable to a few other locations and SW sent referrals. Pt shares her daughter lives in [place] and son lives in [place] so she would prefer locations closer to [facility] or those locations. Pt would prefer private room if possible. Pt continues to have limited PT participation due to pain and unable to have hip surgery until outpatient per MD. Pt agreeable to either wheelchair transport (aware of possible OOP cost) or family to transport if there are available. Pt declined any other questions. PAS needed.    TCU Referrals:   - [facility] - admissions P: [phone] - 8/2: [facility]  - [facility] - 8/2: reviewing, though would need to see pain better managed and improved PT participation  - [facility]  - [facility]  - [facility]  - [facility]  - [facility]  - [facility]  - [facility] -  declined due to hx substance use    SW will continue to assess and follow for discharge planning needs.     Lennie Leiva MSW [facility]  Phone: [phone]

## 2022-08-02 NOTE — PLAN OF CARE
A/Ox4 and cooperative with cares. Continues to have pain in left hand and RLE. Given PRN Oxycodone 10mg x2 and ice applied with some effectiveness. Chronic numbness and tingling in RLE. Voiding well with good po. VSS. Pending TCU placement.    PRIMARY DIAGNOSIS: ACUTE PAIN  OUTPATIENT/OBSERVATION GOALS TO BE MET BEFORE DISCHARGE:  1. Pain Status: Improved-controlled with oral pain medications.    2. Return to near baseline physical activity: No    3. Cleared for discharge by consultants (if involved): No    Discharge Planner Nurse   Safe discharge environment identified: No  Barriers to discharge: Yes, Needs TCU placement         Entered by: Jannet Tucker RN 08/02/2022 5:46 PM     Please review provider order for any additional goals.   Nurse to notify provider when observation goals have been met and patient is ready for discharge.    Jannet Tucker RN on 8/2/2022 at 10:03 PM

## 2022-08-02 NOTE — PLAN OF CARE
PRIMARY DIAGNOSIS: ACUTE PAIN  OUTPATIENT/OBSERVATION GOALS TO BE MET BEFORE DISCHARGE:  1. Pain Status: Improved-controlled with oral pain medications.    2. Return to near baseline physical activity: No    3. Cleared for discharge by consultants (if involved): No    Discharge Planner Nurse   Safe discharge environment identified: No  Barriers to discharge: Yes, needs TCU       Entered by: Jannet Tucker RN 08/02/2022 5:43 PM     Please review provider order for any additional goals.   Nurse to notify provider when observation goals have been met and patient is ready for discharge.

## 2022-08-02 NOTE — PLAN OF CARE
Problem: Plan of Care - These are the overarching goals to be used throughout the patient stay.    Goal: Plan of Care Review/Shift Note  Description: The Plan of Care Review/Shift note should be completed every shift.  The Outcome Evaluation is a brief statement about your assessment that the patient is improving, declining, or no change.  This information will be displayed automatically on your shift note.  Outcome: Ongoing, Not Progressing  Flowsheets (Taken 8/2/2022 4324)  Plan of Care Reviewed With: patient  Overall Patient Progress: no change   Goal Outcome Evaluation:    Plan of Care Reviewed With: patient     Overall Patient Progress: no change    Patient has very difficult time turning and repositioning in bed due to right hip and groin pain. Pain is excruciating. PRN oxycodone and ice packs given to manage pain. Stated she wanted to see and talk with her MD and  today. Sticky note left for both.

## 2022-08-02 NOTE — PLAN OF CARE
PRIMARY DIAGNOSIS: ACUTE PAIN  OUTPATIENT/OBSERVATION GOALS TO BE MET BEFORE DISCHARGE:  1. Pain Status: Improved-controlled with oral pain medications.    2. Return to near baseline physical activity: No    3. Cleared for discharge by consultants (if involved): No    Discharge Planner Nurse   Safe discharge environment identified: No  Barriers to discharge: Yes       Entered by: Jannet Tucker RN 08/01/2022 9:39 PM     Please review provider order for any additional goals.   Nurse to notify provider when observation goals have been met and patient is ready for discharge.

## 2022-08-02 NOTE — PROGRESS NOTES
Hospitalist Progress Note        CODE STATUS:  Full Code    08/02/22  Assessment/Plan:  56-year-old female with history of anxiety/depression, mood disorder, chronic low back pain, fibromyalgia, hypertension, hepatic steatosis, asthma, prior C3 transverse process fracture after fall and reflux esophagitis with benign-appearing esophageal stricture, nonbleeding duodenal ulcer and known hip OA presents with right hip pain.    Right Hip OA  - Right hip pain: X-ray pelvis and hip shows severe degenerative arthritic changes in the right hip, including complete joint space loss with subchondral sclerosis and cystic change   on both sides of the joint. Patient states she was supposed to have hip replacement in June but this was postponed due to hypertension.  - Followed by [facility]. No plan for surgery on this admission. Patient told to follow up outpatient to plan surgery. Ortho signed off today.  - PT/OT, WBAT RLE . Awaiting TCU placement.  - Pain management     HTN  - BP readings are acceptable. Continue Norvasc 5 mg daily.    Depression/Anxiety  - Continue Cymbalta     DVT Prophylaxis: Lovenox     Barrier to discharge: TCU placement     Subjective:  Interval History:   Patient seen and examined.   Unhappy about no plan for surgery this admission. This plan seems not to be new.      Review of Systems:   As mentioned in subjective.    Patient Active Problem List   Diagnosis     Enthesopathy of hip region     Backache     Cervicalgia     Melena     C3 cervical fracture (H)     ARJUN (acute kidney injury) (H)     Hyperkalemia     Hyponatremia     Abnormal LFTs     Asthma     Hepatic steatosis     Chronic low back pain     Depression     Fibromyalgia     HTN (hypertension)     Obesity     Acute kidney injury (H)     Closed fracture of transverse process of cervical vertebra, initial encounter (H)     Alcohol use disorder, severe, dependence (H)     Severe Osteoarthritis of right hip       Scheduled Meds:     amLODIPine  5 mg Oral Daily     DULoxetine  100 mg Oral At Bedtime     folic acid  1 mg Oral Daily     gabapentin  1,200 mg Oral Q8H     lidocaine  3 patch Transdermal Q24H     lidocaine   Transdermal Q8H YULISA     multivitamin, therapeutic  1 tablet Oral Daily     pantoprazole  40 mg Oral Daily     thiamine  100 mg Oral Daily     vitamin D2  50,000 Units Oral Q7 Days     Continuous Infusions:  PRN Meds:.albuterol, calcium carbonate, fluticasone, hydrALAZINE, ketotifen, loratadine, melatonin, naloxone **OR** naloxone **OR** naloxone **OR** naloxone, ondansetron **OR** [DISCONTINUED] ondansetron, oxyCODONE, prochlorperazine **OR** prochlorperazine **OR** prochlorperazine    Objective:  Vital signs in last 24 hours:  Temp:  [97.5  F (36.4  C)-98.4  F (36.9  C)] 97.5  F (36.4  C)  Pulse:  [88-97] 89  Resp:  [16-20] 20  BP: (126-136)/(70-77) 128/74  SpO2:  [90 %-94 %] 94 %        Intake/Output Summary (Last 24 hours) at 8/2/2022 1507  Last data filed at 8/2/2022 1300  Gross per 24 hour   Intake 2450 ml   Output 3650 ml   Net -1200 ml       Physical Exam:  General: In NAD.  HEENT: NCAT, EOMI  CV: Normal S1S2, regular rhythm, normal rate  Lungs: CTAB  Abdomen: Soft, NT, ND, +BS  Extremities: No LE edema b/l  Neuro: AAOx3.   Psych: Normal Affect.     Lab Results:    No results found for this or any previous visit (from the past 24 hour(s)).    Serum Glucose range:   Recent Labs   Lab 07/27/22  2145   GLC 96     ABG: No lab results found in last 7 days.  CBC:   Recent Labs   Lab 07/27/22 2145   WBC 10.9   HGB 13.2   HCT 40.0   *        Chemistry:   Recent Labs   Lab 07/31/22  0517 07/30/22  1151 07/27/22  2145   NA  --   --  139   POTASSIUM  --   --  4.4   CHLORIDE  --   --  105   CO2  --   --  22   BUN  --   --  21   CR 0.78  --  0.95   GFRESTIMATED 88  --  69   WILSON  --   --  10.1   PROTTOTAL  --  7.5  --    ALBUMIN  --  3.4*  --    AST  --  118*  --    ALT  --  271*  --    ALKPHOS  --  387*  --    BILITOTAL   --  0.7  --      Coags:  No results for input(s): INR, PROTIME, PTT in the last 168 hours.    Invalid input(s): APTT  Cardiac Markers:  No results for input(s): CKTOTAL, TROPONINI in the last 168 hours.       XR Pelvis and Hip Right 2 Views    Result Date: 7/27/2022  EXAM: XR PELVIS AND HIP RIGHT 2 VIEWS LOCATION: [facility] DATE/TIME: 7/27/2022 9:23 PM INDICATION: Right hip pain. COMPARISON: None.     IMPRESSION: Acetabular dysplasia/hypoplasia, with a shallow acetabulum and a chronically flattened femoral head, with additional severe degenerative arthritic changes in the right hip, including complete joint space loss with subchondral sclerosis and cystic change on both sides of the joint. No acute fracture. No overt dislocation. Uncomplicated left total hip arthroplasty. Prior fusion at L5-S1. Advanced degenerative disc changes at L4-L5. Mild degenerative changes in the lumbar spinal elsewhere.    XR Cervical Spine 2/3 Views    Result Date: 7/14/2022  EXAM: XR CERVICAL SPINE 2/3 VWS LOCATION: [facility] DATE/TIME: 7/14/2022 9:00 AM INDICATION:  C3 cervical fracture (H) COMPARISON: Plain films 06/29/2022. TECHNIQUE: CR Cervical Spine.     IMPRESSION: As before, the known right C3 transverse process fracture is not visualized with plain films. Vertebral body heights maintained with stable satisfactory lateral alignment. Stable intact anterior discectomy and fusion changes C4-C7. Stable facet degeneration throughout the cervical facets. Normal prevertebral tissues. Lung apices clear.           08/02/2022   Fransisca Brian MD  Hospitalist  Pager: [phone]

## 2022-08-02 NOTE — PROGRESS NOTES
"Orthopedic Progress Note      Assessment:    Chronic right hip pain, severe osteoarthritis     Plan:   -No acute surgical intervention during inpatient.    -Conservative treatment  -WBAT RLE  -PT/OT  -Pain management per hospital team  -Recommend outpatient follow-up with [facility]  -Ortho will sign off. Please reach out if there are any questions/concerns      Subjective:  Pain: mild-moderate  Neuro:  Patient denies new onset numbness or paresthesias    Patient reports pain maintained to stay the same. Patient reports not getting up to ambulate due to pain. Patient spoke to Dr. Montiel 07/30/22([facility]), who recommended discharge with follow-up and have a total hip done as an outpatient and that there would be no guarantee of the timing, or availability, surgeon availability to do this if she remains inpatient. Patient reports working with [facility] orthopedics to schedule PETRA once discharge to TCU. All questions/concerns answered.    Objective:  /74 (BP Location: Right arm, Patient Position: Semi-Yanez's, Cuff Size: Adult Small)   Pulse 89   Temp 97.5  F (36.4  C) (Axillary)   Resp 20   Ht 1.6 m (5' 3\")   Wt 63.5 kg (140 lb)   SpO2 94%   BMI 24.80 kg/m    General: On examination, the patient is awake and alert and oriented to general circumstances   SKIN: There is no evidence of ecchymosis, warmth, erythema, edema, deformity, break to the skin, open wound.  Pulses:  dorsalis pedis pulse is intact  Sensation: L4-S1 intact    Pertinent Labs   Lab Results: personally reviewed.   Lab Results   Component Value Date    INR 0.93 06/28/2022    INR 0.91 06/21/2019     Lab Results   Component Value Date    WBC 10.9 07/27/2022    HGB 13.2 07/27/2022    HCT 40.0 07/27/2022     (H) 07/27/2022     07/27/2022     Lab Results   Component Value Date     07/27/2022    CO2 22 07/27/2022         Report completed by:  Jackie Tucker PA-C, [place]      "

## 2022-08-02 NOTE — PROGRESS NOTES
PRIMARY DIAGNOSIS: ACUTE PAIN  OUTPATIENT/OBSERVATION GOALS TO BE MET BEFORE DISCHARGE:  1. Pain Status: Improved-controlled with oral pain medications.    2. Return to near baseline physical activity: No    3. Cleared for discharge by consultants (if involved): No    Discharge Planner Nurse   Safe discharge environment identified: No  Barriers to discharge: Yes        Entered by: Trinh Martin RN 08/02/2022 12:10 PM     Patient is A/Ox4 and VS stable. No change since previous note 8/2/22 AM. Purwick in place, 450 ml UOP. Awaiting placement to TCU.

## 2022-08-02 NOTE — PLAN OF CARE
A/O x4. Pt reports chronic numbness and tingling in RLE. CMST intact in LUE, cast on and ace wrap C/D/I. Continues to have pain in RLE with movement, given PRN Oxycodone 10mg x2 with some effectiveness. Log rolled in bed. Voiding well with good po. VSS.    PRIMARY DIAGNOSIS: ACUTE PAIN  OUTPATIENT/OBSERVATION GOALS TO BE MET BEFORE DISCHARGE:  1. Pain Status: Improved-controlled with oral pain medications.    2. Return to near baseline physical activity: No    3. Cleared for discharge by consultants (if involved): No    Discharge Planner Nurse   Safe discharge environment identified: No  Barriers to discharge: Yes       Entered by: Jannet Tucker RN 08/01/2022 9:40 PM     Please review provider order for any additional goals.   Nurse to notify provider when observation goals have been met and patient is ready for discharge.      Jannet Tucker RN on 8/1/2022 at 10:35 PM

## 2022-08-02 NOTE — PROGRESS NOTES
PRIMARY DIAGNOSIS: ACUTE PAIN  OUTPATIENT/OBSERVATION GOALS TO BE MET BEFORE DISCHARGE:  1. Pain Status: Improved-controlled with oral pain medications.    2. Return to near baseline physical activity: No    3. Cleared for discharge by consultants (if involved): No    Discharge Planner Nurse   Safe discharge environment identified: No  Barriers to discharge: Yes       Entered by: Trinh Martin RN 08/02/2022 9:36 AM     Patient is A/Ox4 and VS stable. Patient verbalized worst concern at the moment is the pain in her R hip. She has neck collar on and wrap in her L arm/hand. She takes oral oxycodone for pain control and intermittent ice packs on R hip. Lungs clear.

## 2022-08-03 LAB
ALBUMIN SERPL-MCNC: 3.1 G/DL (ref 3.5–5)
ALP SERPL-CCNC: 602 U/L (ref 45–120)
ALT SERPL W P-5'-P-CCNC: 330 U/L (ref 0–45)
AST SERPL W P-5'-P-CCNC: 303 U/L (ref 0–40)
BILIRUB DIRECT SERPL-MCNC: 0.5 MG/DL
BILIRUB SERPL-MCNC: 0.9 MG/DL (ref 0–1)
CREAT SERPL-MCNC: 0.74 MG/DL (ref 0.6–1.1)
GFR SERPL CREATININE-BSD FRML MDRD: >90 ML/MIN/1.73M2
PLATELET # BLD AUTO: 367 10E3/UL (ref 150–450)
PROT SERPL-MCNC: 7 G/DL (ref 6–8)

## 2022-08-03 PROCEDURE — 36415 COLL VENOUS BLD VENIPUNCTURE: CPT | Performed by: HOSPITALIST

## 2022-08-03 PROCEDURE — 250N000013 HC RX MED GY IP 250 OP 250 PS 637: Performed by: HOSPITALIST

## 2022-08-03 PROCEDURE — 82565 ASSAY OF CREATININE: CPT | Performed by: INTERNAL MEDICINE

## 2022-08-03 PROCEDURE — 250N000013 HC RX MED GY IP 250 OP 250 PS 637: Performed by: INTERNAL MEDICINE

## 2022-08-03 PROCEDURE — 250N000011 HC RX IP 250 OP 636: Performed by: HOSPITALIST

## 2022-08-03 PROCEDURE — 80076 HEPATIC FUNCTION PANEL: CPT | Performed by: HOSPITALIST

## 2022-08-03 PROCEDURE — G0378 HOSPITAL OBSERVATION PER HR: HCPCS

## 2022-08-03 PROCEDURE — 250N000013 HC RX MED GY IP 250 OP 250 PS 637: Performed by: PAIN MEDICINE

## 2022-08-03 PROCEDURE — 250N000011 HC RX IP 250 OP 636: Performed by: INTERNAL MEDICINE

## 2022-08-03 PROCEDURE — 96372 THER/PROPH/DIAG INJ SC/IM: CPT | Performed by: HOSPITALIST

## 2022-08-03 PROCEDURE — 85049 AUTOMATED PLATELET COUNT: CPT | Performed by: HOSPITALIST

## 2022-08-03 PROCEDURE — 99225 PR SUBSEQUENT OBSERVATION CARE,LEVEL II: CPT | Performed by: HOSPITALIST

## 2022-08-03 RX ADMIN — ENOXAPARIN SODIUM 40 MG: 40 INJECTION SUBCUTANEOUS at 16:11

## 2022-08-03 RX ADMIN — AMLODIPINE BESYLATE 5 MG: 5 TABLET ORAL at 07:46

## 2022-08-03 RX ADMIN — OXYCODONE HYDROCHLORIDE 10 MG: 5 TABLET ORAL at 09:33

## 2022-08-03 RX ADMIN — GABAPENTIN 1200 MG: 300 CAPSULE ORAL at 07:46

## 2022-08-03 RX ADMIN — OXYCODONE HYDROCHLORIDE 10 MG: 5 TABLET ORAL at 22:20

## 2022-08-03 RX ADMIN — PANTOPRAZOLE SODIUM 40 MG: 40 TABLET, DELAYED RELEASE ORAL at 07:47

## 2022-08-03 RX ADMIN — OXYCODONE HYDROCHLORIDE 10 MG: 5 TABLET ORAL at 16:11

## 2022-08-03 RX ADMIN — THIAMINE HCL TAB 100 MG 100 MG: 100 TAB at 07:46

## 2022-08-03 RX ADMIN — ONDANSETRON 4 MG: 4 TABLET, ORALLY DISINTEGRATING ORAL at 16:10

## 2022-08-03 RX ADMIN — DULOXETINE HYDROCHLORIDE 80 MG: 60 CAPSULE, DELAYED RELEASE PELLETS ORAL at 21:42

## 2022-08-03 RX ADMIN — OXYCODONE HYDROCHLORIDE 10 MG: 5 TABLET ORAL at 04:19

## 2022-08-03 RX ADMIN — THERA TABS 1 TABLET: TAB at 07:46

## 2022-08-03 RX ADMIN — FOLIC ACID 1 MG: 1 TABLET ORAL at 07:46

## 2022-08-03 RX ADMIN — GABAPENTIN 1200 MG: 300 CAPSULE ORAL at 16:11

## 2022-08-03 NOTE — PROGRESS NOTES
Hospitalist Progress Note        CODE STATUS:  Full Code    Assessment/Plan:  56-year-old female with history of anxiety/depression, mood disorder, chronic low back pain, fibromyalgia, hypertension, hepatic steatosis, asthma, prior C3 transverse process fracture after fall and reflux esophagitis with benign-appearing esophageal stricture, nonbleeding duodenal ulcer and known hip OA presents with right hip pain.    Right Hip OA  - Right hip pain: X-ray pelvis and hip shows severe degenerative arthritic changes in the right hip, including complete joint space loss with subchondral sclerosis and cystic change   on both sides of the joint. Patient states she was supposed to have hip replacement in June but this was postponed due to hypertension.  - Followed by [facility]. No plan for surgery on this admission. Patient told to follow up outpatient to plan surgery. Ortho signed off today.  - PT/OT, WBAT RLE . Awaiting TCU placement.  - Pain management     HTN  - BP readings are acceptable. Continue Norvasc 5 mg daily.    Depression/Anxiety  - Continue Cymbalta. Taper by 20 every other day.     DVT Prophylaxis: Lovenox     Barrier to discharge: TCU placement     Subjective:  Interval History:   Patient seen and examined.   Feels she did better with PT today.      Review of Systems:   As mentioned in subjective.    Patient Active Problem List   Diagnosis     Enthesopathy of hip region     Backache     Cervicalgia     Melena     C3 cervical fracture (H)     ARJUN (acute kidney injury) (H)     Hyperkalemia     Hyponatremia     Abnormal LFTs     Asthma     Hepatic steatosis     Chronic low back pain     Depression     Fibromyalgia     HTN (hypertension)     Obesity     Acute kidney injury (H)     Closed fracture of transverse process of cervical vertebra, initial encounter (H)     Alcohol use disorder, severe, dependence (H)     Severe Osteoarthritis of right hip       Scheduled Meds:    amLODIPine  5 mg Oral Daily      DULoxetine  80 mg Oral At Bedtime     enoxaparin ANTICOAGULANT  40 mg Subcutaneous Q24H     folic acid  1 mg Oral Daily     gabapentin  1,200 mg Oral Q8H     lidocaine  3 patch Transdermal Q24H     lidocaine   Transdermal Q8H YUILSA     multivitamin, therapeutic  1 tablet Oral Daily     pantoprazole  40 mg Oral Daily     thiamine  100 mg Oral Daily     vitamin D2  50,000 Units Oral Q7 Days     Continuous Infusions:  PRN Meds:.albuterol, calcium carbonate, fluticasone, hydrALAZINE, ketotifen, loratadine, melatonin, naloxone **OR** naloxone **OR** naloxone **OR** naloxone, ondansetron **OR** [DISCONTINUED] ondansetron, oxyCODONE, prochlorperazine **OR** prochlorperazine **OR** prochlorperazine    Objective:  Vital signs in last 24 hours:  Temp:  [97.3  F (36.3  C)-97.7  F (36.5  C)] 97.5  F (36.4  C)  Pulse:  [74-92] 88  Resp:  [16-19] 18  BP: (123-143)/(65-79) 123/79  SpO2:  [93 %-95 %] 95 %        Intake/Output Summary (Last 24 hours) at 8/2/2022 1507  Last data filed at 8/2/2022 1300  Gross per 24 hour   Intake 2450 ml   Output 3650 ml   Net -1200 ml       Physical Exam:  General: In NAD.  HEENT: NCAT, EOMI  CV: Normal S1S2, regular rhythm, normal rate  Lungs: CTAB  Abdomen: Soft, NT, ND, +BS  Extremities: No LE edema b/l  Neuro: AAOx3.   Psych: Normal Affect.     Lab Results:    Recent Results (from the past 24 hour(s))   Creatinine    Collection Time: 08/03/22  5:51 AM   Result Value Ref Range    Creatinine 0.74 0.60 - 1.10 mg/dL    GFR Estimate >90 >60 mL/min/1.73m2   Hepatic panel    Collection Time: 08/03/22  5:51 AM   Result Value Ref Range    Bilirubin Total 0.9 0.0 - 1.0 mg/dL    Bilirubin Direct 0.5 <=0.5 mg/dL    Protein Total 7.0 6.0 - 8.0 g/dL    Albumin 3.1 (L) 3.5 - 5.0 g/dL    Alkaline Phosphatase 602 (H) 45 - 120 U/L     (H) 0 - 40 U/L     (H) 0 - 45 U/L   Platelet count    Collection Time: 08/03/22  5:51 AM   Result Value Ref Range    Platelet Count 367 150 - 450 10e3/uL       Serum  Glucose range:   Recent Labs   Lab 07/27/22  2145   GLC 96     ABG: No lab results found in last 7 days.  CBC:   Recent Labs   Lab 08/03/22  0551 07/27/22  2145   WBC  --  10.9   HGB  --  13.2   HCT  --  40.0   MCV  --  110*    321     Chemistry:   Recent Labs   Lab 08/03/22  0551 07/31/22  0517 07/30/22  1151 07/27/22  2145   NA  --   --   --  139   POTASSIUM  --   --   --  4.4   CHLORIDE  --   --   --  105   CO2  --   --   --  22   BUN  --   --   --  21   CR 0.74 0.78  --  0.95   GFRESTIMATED >90 88  --  69   WILSON  --   --   --  10.1   PROTTOTAL 7.0  --  7.5  --    ALBUMIN 3.1*  --  3.4*  --    *  --  118*  --    *  --  271*  --    ALKPHOS 602*  --  387*  --    BILITOTAL 0.9  --  0.7  --      Coags:  No results for input(s): INR, PROTIME, PTT in the last 168 hours.    Invalid input(s): APTT  Cardiac Markers:  No results for input(s): CKTOTAL, TROPONINI in the last 168 hours.       XR Pelvis and Hip Right 2 Views    Result Date: 7/27/2022  EXAM: XR PELVIS AND HIP RIGHT 2 VIEWS LOCATION: [facility] DATE/TIME: 7/27/2022 9:23 PM INDICATION: Right hip pain. COMPARISON: None.     IMPRESSION: Acetabular dysplasia/hypoplasia, with a shallow acetabulum and a chronically flattened femoral head, with additional severe degenerative arthritic changes in the right hip, including complete joint space loss with subchondral sclerosis and cystic change on both sides of the joint. No acute fracture. No overt dislocation. Uncomplicated left total hip arthroplasty. Prior fusion at L5-S1. Advanced degenerative disc changes at L4-L5. Mild degenerative changes in the lumbar spinal elsewhere.    XR Cervical Spine 2/3 Views    Result Date: 7/14/2022  EXAM: XR CERVICAL SPINE 2/3 VWS LOCATION: [facility] DATE/TIME: 7/14/2022 9:00 AM INDICATION:  C3 cervical fracture (H) COMPARISON: Plain films 06/29/2022. TECHNIQUE: CR Cervical Spine.     IMPRESSION: As before, the known  right C3 transverse process fracture is not visualized with plain films. Vertebral body heights maintained with stable satisfactory lateral alignment. Stable intact anterior discectomy and fusion changes C4-C7. Stable facet degeneration throughout the cervical facets. Normal prevertebral tissues. Lung apices clear.           08/03/2022   Fransisca Brian MD  Hospitalist  Pager: [phone]

## 2022-08-03 NOTE — PLAN OF CARE
PRIMARY DIAGNOSIS: GENERALIZED WEAKNESS    OUTPATIENT/OBSERVATION GOALS TO BE MET BEFORE DISCHARGE  1. Orthostatic performed: N/A    2. Tolerating PO medications: Yes    3. Return to near baseline physical activity: No    4. Cleared for discharge by consultants (if involved): No    Discharge Planner Nurse   Safe discharge environment identified: No  Barriers to discharge: Yes       Entered by: Nils Cohen RN 08/03/2022 8:56 AM     Please review provider order for any additional goals.   Nurse to notify provider when observation goals have been met and patient is ready for discharge.Goal Outcome Evaluation:

## 2022-08-03 NOTE — PLAN OF CARE
PRIMARY DIAGNOSIS: GENERALIZED WEAKNESS    OUTPATIENT/OBSERVATION GOALS TO BE MET BEFORE DISCHARGE  1. Orthostatic performed: N/A    2. Tolerating PO medications: Yes    3. Return to near baseline physical activity: No    4. Cleared for discharge by consultants (if involved): No    Discharge Planner Nurse   Safe discharge environment identified: No  Barriers to discharge: Yes       Entered by: Nils Cohen RN 08/03/2022 11:10 AM     Please review provider order for any additional goals.   Nurse to notify provider when observation goals have been met and patient is ready for discharge.Goal Outcome Evaluation:

## 2022-08-03 NOTE — PLAN OF CARE
Problem: Plan of Care - These are the overarching goals to be used throughout the patient stay.    Goal: Optimal Comfort and Wellbeing  Outcome: Ongoing, Progressing  Intervention: Monitor Pain and Promote Comfort  Recent Flowsheet Documentation  Taken 8/3/2022 0038 by Shashi De Leon, RN  Pain Management Interventions: emotional support   Goal Outcome Evaluation:        Pain control with PRN Oxycodone.

## 2022-08-04 ENCOUNTER — APPOINTMENT (OUTPATIENT)
Dept: ULTRASOUND IMAGING | Facility: HOSPITAL | Age: 57
End: 2022-08-04
Attending: HOSPITALIST
Payer: COMMERCIAL

## 2022-08-04 ENCOUNTER — APPOINTMENT (OUTPATIENT)
Dept: PHYSICAL THERAPY | Facility: HOSPITAL | Age: 57
End: 2022-08-04
Payer: COMMERCIAL

## 2022-08-04 PROCEDURE — 97530 THERAPEUTIC ACTIVITIES: CPT | Mod: GP

## 2022-08-04 PROCEDURE — G0378 HOSPITAL OBSERVATION PER HR: HCPCS

## 2022-08-04 PROCEDURE — 250N000013 HC RX MED GY IP 250 OP 250 PS 637: Performed by: PAIN MEDICINE

## 2022-08-04 PROCEDURE — 96372 THER/PROPH/DIAG INJ SC/IM: CPT | Performed by: HOSPITALIST

## 2022-08-04 PROCEDURE — 76705 ECHO EXAM OF ABDOMEN: CPT

## 2022-08-04 PROCEDURE — 250N000011 HC RX IP 250 OP 636: Performed by: HOSPITALIST

## 2022-08-04 PROCEDURE — 250N000011 HC RX IP 250 OP 636: Performed by: INTERNAL MEDICINE

## 2022-08-04 PROCEDURE — 250N000013 HC RX MED GY IP 250 OP 250 PS 637: Performed by: INTERNAL MEDICINE

## 2022-08-04 PROCEDURE — 97110 THERAPEUTIC EXERCISES: CPT | Mod: GP

## 2022-08-04 PROCEDURE — 99225 PR SUBSEQUENT OBSERVATION CARE,LEVEL II: CPT | Performed by: HOSPITALIST

## 2022-08-04 PROCEDURE — 250N000013 HC RX MED GY IP 250 OP 250 PS 637: Performed by: HOSPITALIST

## 2022-08-04 RX ADMIN — ERGOCALCIFEROL 50000 UNITS: 1.25 CAPSULE, LIQUID FILLED ORAL at 07:58

## 2022-08-04 RX ADMIN — ONDANSETRON 4 MG: 4 TABLET, ORALLY DISINTEGRATING ORAL at 16:36

## 2022-08-04 RX ADMIN — GABAPENTIN 1200 MG: 300 CAPSULE ORAL at 00:06

## 2022-08-04 RX ADMIN — DULOXETINE HYDROCHLORIDE 80 MG: 60 CAPSULE, DELAYED RELEASE PELLETS ORAL at 22:11

## 2022-08-04 RX ADMIN — KETOTIFEN FUMARATE 1 DROP: 0.35 SOLUTION/ DROPS OPHTHALMIC at 00:06

## 2022-08-04 RX ADMIN — THIAMINE HCL TAB 100 MG 100 MG: 100 TAB at 07:58

## 2022-08-04 RX ADMIN — THERA TABS 1 TABLET: TAB at 07:59

## 2022-08-04 RX ADMIN — LORATADINE 10 MG: 10 TABLET ORAL at 05:49

## 2022-08-04 RX ADMIN — GABAPENTIN 1200 MG: 300 CAPSULE ORAL at 16:28

## 2022-08-04 RX ADMIN — AMLODIPINE BESYLATE 5 MG: 5 TABLET ORAL at 07:58

## 2022-08-04 RX ADMIN — FOLIC ACID 1 MG: 1 TABLET ORAL at 07:58

## 2022-08-04 RX ADMIN — OXYCODONE HYDROCHLORIDE 10 MG: 5 TABLET ORAL at 22:12

## 2022-08-04 RX ADMIN — OXYCODONE HYDROCHLORIDE 10 MG: 5 TABLET ORAL at 05:44

## 2022-08-04 RX ADMIN — ENOXAPARIN SODIUM 40 MG: 40 INJECTION SUBCUTANEOUS at 16:28

## 2022-08-04 RX ADMIN — PANTOPRAZOLE SODIUM 40 MG: 40 TABLET, DELAYED RELEASE ORAL at 07:58

## 2022-08-04 RX ADMIN — OXYCODONE HYDROCHLORIDE 10 MG: 5 TABLET ORAL at 11:16

## 2022-08-04 RX ADMIN — Medication 3 MG: at 00:06

## 2022-08-04 RX ADMIN — GABAPENTIN 1200 MG: 300 CAPSULE ORAL at 07:57

## 2022-08-04 NOTE — PLAN OF CARE
Problem: Plan of Care - These are the overarching goals to be used throughout the patient stay.    Goal: Optimal Comfort and Wellbeing  Outcome: Ongoing, Progressing  Intervention: Monitor Pain and Promote Comfort  Recent Flowsheet Documentation  Taken 8/4/2022 0011 by Shashi De Leon, RN  Pain Management Interventions: emotional support   Goal Outcome Evaluation:      Patient resting comfortably.

## 2022-08-04 NOTE — PLAN OF CARE
PRIMARY DIAGNOSIS: ACUTE PAIN  OUTPATIENT/OBSERVATION GOALS TO BE MET BEFORE DISCHARGE:  1. Pain Status: Improved-controlled with oral pain medications.    2. Return to near baseline physical activity: No    3. Cleared for discharge by consultants (if involved): No    Discharge Planner Nurse   Safe discharge environment identified: No  Barriers to discharge: Yes       Entered by: Ger Brooks RN 08/04/2022 10:37 AM     Please review provider order for any additional goals.   Nurse to notify provider when observation goals have been met and patient is ready for discharge.Goal Outcome Evaluation:

## 2022-08-04 NOTE — PLAN OF CARE
Patient spent a fair shift with some mood swings between weeping and happy/laughter moments. Pain and discomforts managed with prn Oxycodone and scheduled gabapentin. Lidocaine patch not in place, patient stated it does her no good, therefore she did not want it. Good appetites at supper. PureWick in use. Patient putting out good amount of urine.   Problem: Plan of Care - These are the overarching goals to be used throughout the patient stay.    Goal: Optimal Comfort and Wellbeing  Outcome: Ongoing, Progressing  Intervention: Provide Person-Centered Care  Recent Flowsheet Documentation  Taken 8/3/2022 1607 by Edith Wall RN  Trust Relationship/Rapport: care explained     Problem: Pain Acute  Goal: Acceptable Pain Control and Functional Ability  Outcome: Ongoing, Progressing  Intervention: Prevent or Manage Pain  Recent Flowsheet Documentation  Taken 8/3/2022 1607 by Edith Wall RN  Medication Review/Management: medications reviewed   Goal Outcome Evaluation:

## 2022-08-04 NOTE — PROGRESS NOTES
Hospitalist Progress Note        CODE STATUS:  Full Code    Assessment/Plan:  56-year-old female with history of anxiety/depression, mood disorder, chronic low back pain, fibromyalgia, hypertension, hepatic steatosis, asthma, prior C3 transverse process fracture after fall and reflux esophagitis with benign-appearing esophageal stricture, nonbleeding duodenal ulcer and known hip OA presents with right hip pain.    Right Hip OA  - Right hip pain: X-ray pelvis and hip shows severe degenerative arthritic changes in the right hip, including complete joint space loss with subchondral sclerosis and cystic change   on both sides of the joint. Patient states she was supposed to have hip replacement in June but this was postponed due to hypertension.  - Followed by [facility]. No plan for surgery on this admission. Patient told to follow up outpatient to plan surgery. Ortho signed off today.  - PT/OT, WBAT RLE . Awaiting TCU placement.  - Pain management     Elevated LFTs  - Thought to be due to cymbalta per pain management? Currently tapering off.  - Tylenol discontinued  - Check RUQ ultrasound to r/o liver disease other wise can check after cymbalta has been tapered off.    HTN  - BP readings are acceptable. Continue Norvasc 5 mg daily.    Depression/Anxiety  - Continue Cymbalta. Taper by 20 every other day. Taper to 60 tomorrow.    DVT Prophylaxis: Lovenox     Barrier to discharge: TCU placement     Subjective:  Interval History:   Patient seen and examined.   No events overnight.       Review of Systems:   As mentioned in subjective.    Patient Active Problem List   Diagnosis     Enthesopathy of hip region     Backache     Cervicalgia     Melena     C3 cervical fracture (H)     ARJUN (acute kidney injury) (H)     Hyperkalemia     Hyponatremia     Abnormal LFTs     Asthma     Hepatic steatosis     Chronic low back pain     Depression     Fibromyalgia     HTN (hypertension)     Obesity     Acute kidney injury (H)      Closed fracture of transverse process of cervical vertebra, initial encounter (H)     Alcohol use disorder, severe, dependence (H)     Severe Osteoarthritis of right hip       Scheduled Meds:    amLODIPine  5 mg Oral Daily     DULoxetine  80 mg Oral At Bedtime     enoxaparin ANTICOAGULANT  40 mg Subcutaneous Q24H     folic acid  1 mg Oral Daily     gabapentin  1,200 mg Oral Q8H     lidocaine  3 patch Transdermal Q24H     lidocaine   Transdermal Q8H YULISA     multivitamin, therapeutic  1 tablet Oral Daily     pantoprazole  40 mg Oral Daily     thiamine  100 mg Oral Daily     vitamin D2  50,000 Units Oral Q7 Days     Continuous Infusions:  PRN Meds:.albuterol, calcium carbonate, fluticasone, hydrALAZINE, ketotifen, loratadine, melatonin, naloxone **OR** naloxone **OR** naloxone **OR** naloxone, ondansetron **OR** [DISCONTINUED] ondansetron, oxyCODONE, prochlorperazine **OR** prochlorperazine **OR** prochlorperazine    Objective:  Vital signs in last 24 hours:  Temp:  [97.2  F (36.2  C)-98.9  F (37.2  C)] 98  F (36.7  C)  Pulse:  [82-94] 93  Resp:  [16-18] 16  BP: (119-150)/(69-87) 141/75  SpO2:  [94 %-97 %] 95 %        Intake/Output Summary (Last 24 hours) at 8/2/2022 1507  Last data filed at 8/2/2022 1300  Gross per 24 hour   Intake 2450 ml   Output 3650 ml   Net -1200 ml       Physical Exam:  General: In NAD.  HEENT: NCAT, EOMI  CV: Normal S1S2, regular rhythm, normal rate  Lungs: CTAB  Abdomen: Soft, NT, ND, +BS  Extremities: No LE edema b/l  Neuro: AAOx3.   Psych: Normal Affect.     Lab Results:    No results found for this or any previous visit (from the past 24 hour(s)).    Serum Glucose range:   No results for input(s): GLC, BGM in the last 168 hours.  ABG: No lab results found in last 7 days.  CBC:   Recent Labs   Lab 08/03/22  0551        Chemistry:   Recent Labs   Lab 08/03/22  0551 07/31/22  0517 07/30/22  1151   CR 0.74 0.78  --    GFRESTIMATED >90 88  --    PROTTOTAL 7.0  --  7.5   ALBUMIN 3.1*  --   3.4*   *  --  118*   *  --  271*   ALKPHOS 602*  --  387*   BILITOTAL 0.9  --  0.7     Coags:  No results for input(s): INR, PROTIME, PTT in the last 168 hours.    Invalid input(s): APTT  Cardiac Markers:  No results for input(s): CKTOTAL, TROPONINI in the last 168 hours.       XR Pelvis and Hip Right 2 Views    Result Date: 7/27/2022  EXAM: XR PELVIS AND HIP RIGHT 2 VIEWS LOCATION: [facility] DATE/TIME: 7/27/2022 9:23 PM INDICATION: Right hip pain. COMPARISON: None.     IMPRESSION: Acetabular dysplasia/hypoplasia, with a shallow acetabulum and a chronically flattened femoral head, with additional severe degenerative arthritic changes in the right hip, including complete joint space loss with subchondral sclerosis and cystic change on both sides of the joint. No acute fracture. No overt dislocation. Uncomplicated left total hip arthroplasty. Prior fusion at L5-S1. Advanced degenerative disc changes at L4-L5. Mild degenerative changes in the lumbar spinal elsewhere.    XR Cervical Spine 2/3 Views    Result Date: 7/14/2022  EXAM: XR CERVICAL SPINE 2/3 VWS LOCATION: [facility] DATE/TIME: 7/14/2022 9:00 AM INDICATION:  C3 cervical fracture (H) COMPARISON: Plain films 06/29/2022. TECHNIQUE: CR Cervical Spine.     IMPRESSION: As before, the known right C3 transverse process fracture is not visualized with plain films. Vertebral body heights maintained with stable satisfactory lateral alignment. Stable intact anterior discectomy and fusion changes C4-C7. Stable facet degeneration throughout the cervical facets. Normal prevertebral tissues. Lung apices clear.           08/04/2022   Fransisca Brian MD  Hospitalist  Pager: [phone]

## 2022-08-04 NOTE — PLAN OF CARE
PRIMARY DIAGNOSIS: ACUTE PAIN  OUTPATIENT/OBSERVATION GOALS TO BE MET BEFORE DISCHARGE:  1. Pain Status: Improved-controlled with oral pain medications.    2. Return to near baseline physical activity: Yes    3. Cleared for discharge by consultants (if involved): Yes    Discharge Planner Nurse   Safe discharge environment identified: Yes  Barriers to discharge: Yes       Entered by: Ger rBooks RN 08/04/2022 1:06 PM     Please review provider order for any additional goals.   Nurse to notify provider when observation goals have been met and patient is ready for discharge.Goal Outcome Evaluation:

## 2022-08-05 ENCOUNTER — APPOINTMENT (OUTPATIENT)
Dept: OCCUPATIONAL THERAPY | Facility: HOSPITAL | Age: 57
End: 2022-08-05
Payer: COMMERCIAL

## 2022-08-05 ENCOUNTER — MEDICAL CORRESPONDENCE (OUTPATIENT)
Dept: MEDSURG UNIT | Facility: HOSPITAL | Age: 57
End: 2022-08-05

## 2022-08-05 ENCOUNTER — APPOINTMENT (OUTPATIENT)
Dept: PHYSICAL THERAPY | Facility: HOSPITAL | Age: 57
End: 2022-08-05
Payer: COMMERCIAL

## 2022-08-05 PROCEDURE — 99225 PR SUBSEQUENT OBSERVATION CARE,LEVEL II: CPT | Performed by: HOSPITALIST

## 2022-08-05 PROCEDURE — 97116 GAIT TRAINING THERAPY: CPT | Mod: GP

## 2022-08-05 PROCEDURE — 97110 THERAPEUTIC EXERCISES: CPT | Mod: GP

## 2022-08-05 PROCEDURE — G0378 HOSPITAL OBSERVATION PER HR: HCPCS

## 2022-08-05 PROCEDURE — 97535 SELF CARE MNGMENT TRAINING: CPT | Mod: GO

## 2022-08-05 PROCEDURE — 250N000013 HC RX MED GY IP 250 OP 250 PS 637: Performed by: PAIN MEDICINE

## 2022-08-05 PROCEDURE — 250N000013 HC RX MED GY IP 250 OP 250 PS 637: Performed by: INTERNAL MEDICINE

## 2022-08-05 PROCEDURE — 250N000011 HC RX IP 250 OP 636: Performed by: HOSPITALIST

## 2022-08-05 PROCEDURE — 96372 THER/PROPH/DIAG INJ SC/IM: CPT | Performed by: HOSPITALIST

## 2022-08-05 PROCEDURE — 97530 THERAPEUTIC ACTIVITIES: CPT | Mod: GP

## 2022-08-05 PROCEDURE — 250N000013 HC RX MED GY IP 250 OP 250 PS 637: Performed by: HOSPITALIST

## 2022-08-05 RX ORDER — DULOXETIN HYDROCHLORIDE 60 MG/1
60 CAPSULE, DELAYED RELEASE ORAL AT BEDTIME
Status: DISCONTINUED | OUTPATIENT
Start: 2022-08-05 | End: 2022-08-07

## 2022-08-05 RX ADMIN — FOLIC ACID 1 MG: 1 TABLET ORAL at 10:30

## 2022-08-05 RX ADMIN — AMLODIPINE BESYLATE 5 MG: 5 TABLET ORAL at 10:29

## 2022-08-05 RX ADMIN — DULOXETINE HYDROCHLORIDE 60 MG: 60 CAPSULE, DELAYED RELEASE PELLETS ORAL at 22:58

## 2022-08-05 RX ADMIN — OXYCODONE HYDROCHLORIDE 10 MG: 5 TABLET ORAL at 20:28

## 2022-08-05 RX ADMIN — OXYCODONE HYDROCHLORIDE 10 MG: 5 TABLET ORAL at 10:39

## 2022-08-05 RX ADMIN — ENOXAPARIN SODIUM 40 MG: 40 INJECTION SUBCUTANEOUS at 16:14

## 2022-08-05 RX ADMIN — PANTOPRAZOLE SODIUM 40 MG: 40 TABLET, DELAYED RELEASE ORAL at 10:29

## 2022-08-05 RX ADMIN — GABAPENTIN 1200 MG: 300 CAPSULE ORAL at 22:57

## 2022-08-05 RX ADMIN — THIAMINE HCL TAB 100 MG 100 MG: 100 TAB at 10:30

## 2022-08-05 RX ADMIN — GABAPENTIN 1200 MG: 300 CAPSULE ORAL at 00:53

## 2022-08-05 RX ADMIN — GABAPENTIN 1200 MG: 300 CAPSULE ORAL at 16:07

## 2022-08-05 RX ADMIN — GABAPENTIN 1200 MG: 300 CAPSULE ORAL at 10:31

## 2022-08-05 RX ADMIN — LORATADINE 10 MG: 10 TABLET ORAL at 10:33

## 2022-08-05 RX ADMIN — THERA TABS 1 TABLET: TAB at 10:30

## 2022-08-05 NOTE — PLAN OF CARE
Problem: Plan of Care - These are the overarching goals to be used throughout the patient stay.    Goal: Plan of Care Review/Shift Note  Description: The Plan of Care Review/Shift note should be completed every shift.  The Outcome Evaluation is a brief statement about your assessment that the patient is improving, declining, or no change.  This information will be displayed automatically on your shift note.  Outcome: Ongoing, Not Progressing  Flowsheets (Taken 8/5/2022 0555)  Overall Patient Progress: no change   Goal Outcome Evaluation:          Overall Patient Progress: no change    Severe right hip osteoarthritis. Pain managed with PRN oxycodone and ice packs. Limited participation with ADLs due to pain. Needs to follow-up with outpatient Orthopedics to plan surgery.

## 2022-08-05 NOTE — PROGRESS NOTES
"Care Management Follow Up    Length of Stay (days): 0    Expected Discharge Date: 08/05/2022     Concerns to be Addressed:   Discharge planning    Patient plan of care discussed at interdisciplinary rounds: Yes    Anticipated Discharge Disposition:  Planning home with home care      Anticipated Discharge Services:  Home PT and OT  Anticipated Discharge DME:  Pt has a walker, raised over toilet commode, CM ordering a wheelchair through [employer]    Patient/family educated on Medicare website which has current facility and service quality ratings:    Education Provided on the Discharge Plan:  yes  Patient/Family in Agreement with the Plan:  Yes- MICHAEL spoke to patient and daughter Angella over the phone in patient's room    Referrals Placed by CM/SW:  [facility]  Private pay costs discussed: Not applicable    Additional Information:  Chart reviewed. SW met with patient in her room. Iintroduced self. CM role.  [facility] She lives alone. Son lives in [place].   SW discussed TCU vs home with home care. Patient still struggling with pain and participation which is a barrier to TCU admission. As MICHAEL was in the room, pt received a phone call from Deepika RN with [facility]. Deepika reports patient is \"next\" on the list for surgery and likely date could be about a month away. She should receive a call to schedule the surgery.   MICHAEL began discussing a home discharge if possible, pt states she is agreeable to this. She has support from neighbors and friends. Daughter Angella also states she thinks patient will be better at home. We discussed ordering a wheelchair and setting up home therapy, pt very agreeable to this, she expresses no additional concern and is okay with a home discharge while waiting for her surgery with [facility]. Pt is aware of her limitations and states she will participate in therapy at home.   Wheelchair to be ordered with [employer]. This " SW called customer service and started order. MD to put in medical necessity documentation and sign order to be faxed to [employer] at Fax: [phone].       Patient also reports she has been sober for 1 month and has started an outpatient program with [facility] and has been in communication with her CD counselor to resume program at home and attend AA meetings via telehealth.    MD updated.      1:53 PM [facility] accepted pt for home care. Home PT and OT.  2:24 PM spoke to PT who completed session with pt, pt able to propel self in wheelchair. PT recommending lightweight wheelchair.  Wheelchair order faxed to [employer] with MD order and MD progress note. Faxed to [employer] at Fax:[phone]. (Phone [phone]) Wheelchair order placed in hard chart.  Andreina Rain, CAILINSW

## 2022-08-05 NOTE — PLAN OF CARE
PRIMARY DIAGNOSIS: GENERALIZED WEAKNESS    OUTPATIENT/OBSERVATION GOALS TO BE MET BEFORE DISCHARGE  1. Orthostatic performed: NA    2. Tolerating PO medications: Yes    3. Return to near baseline physical activity: No    4. Cleared for discharge by consultants (if involved): No    Discharge Planner Nurse   Safe discharge environment identified: Yes  Barriers to discharge: Yes       Entered by: Edgardo Sanchez RN 08/05/2022 11:05 AM     Please review provider order for any additional goals.   Nurse to notify provider when observation goals have been met and patient is ready for discharge.Goal Outcome Evaluation:     Pt's pain manage with prn oxycodone

## 2022-08-05 NOTE — PLAN OF CARE
PRIMARY DIAGNOSIS: ACUTE PAIN  OUTPATIENT/OBSERVATION GOALS TO BE MET BEFORE DISCHARGE:  1. Pain Status: Yes; right hip/groin     2. Return to near baseline physical activity: No    3. Cleared for discharge by consultants (if involved): Yes    Discharge Planner Nurse   Safe discharge environment identified: Yes  Barriers to discharge: Yes       Entered by: Yohana Cueva RN 08/05/2022 5:50 AM     Please review provider order for any additional goals.   Nurse to notify provider when observation goals have been met and patient is ready for discharge.Goal Outcome Evaluation:    Severe osteoarthritis to right hip. Ice packs to area for pain/discomfort. PRN pain medication every 6 hours. Needs surgical intervention but considered elective surgery. Waiting for TCU placement.

## 2022-08-05 NOTE — PLAN OF CARE
Patient spent a fair shift. Alert and oriented x 4. Patient had abdominal ultrasound done this evening around 1900. Patient was NPO 6 hours prior to the ultrasound. See chart for result.      Problem: Plan of Care - These are the overarching goals to be used throughout the patient stay.    Goal: Absence of Hospital-Acquired Illness or Injury  Intervention: Identify and Manage Fall Risk  Recent Flowsheet Documentation  Taken 8/4/2022 2000 by Edith Wall RN  Safety Promotion/Fall Prevention: assistive device/personal items within reach     Problem: Pain Acute  Goal: Acceptable Pain Control and Functional Ability  Intervention: Prevent or Manage Pain  Recent Flowsheet Documentation  Taken 8/4/2022 2000 by Edith Wall, RN  Medication Review/Management: medications reviewed  Pain expresses pain with activities. Patient's pain and discomfort managed with both prn and scheduled pain medications. Patient requested and had prn Zofran earlier on the shift, and received  Claritin at bedtime per patient's request.

## 2022-08-05 NOTE — PROGRESS NOTES
Hospitalist Progress Note        CODE STATUS:  Full Code    Assessment/Plan:  56-year-old female with history of anxiety/depression, mood disorder, chronic low back pain, fibromyalgia, hypertension, hepatic steatosis, asthma, prior C3 transverse process fracture after fall and reflux esophagitis with benign-appearing esophageal stricture, nonbleeding duodenal ulcer and known hip OA presents with right hip pain.    Right Hip OA  - Right hip pain: X-ray pelvis and hip shows severe degenerative arthritic changes in the right hip, including complete joint space loss with subchondral sclerosis and cystic change   on both sides of the joint. Patient states she was supposed to have hip replacement in June but this was postponed due to hypertension.  - Followed by [facility]. No plan for surgery on this admission. Patient told to follow up outpatient to plan surgery. Ortho signed off. Patient daily requests why Ortho does not consider surgery urgent.   - PT/OT, WBAT RLE . She has been awaiting TCU placement.  - Patient now wants to go home with home care  - Patient has a mobility limitation that impairs the ability to accomplish mobility related activities of daily living safely and within a reasonable time frame due to severe osteoarthritis of right hip M16.11 and severe pain.   Patient's mobility cannot be resolved by the use of a cane or walker   And patient lives in a one level home that provides adequate access, maneuvering space and surfaces. The patient can safely propel the manual wheelchair or has a caregiver who can assist.   And the use of a manual wheelchair will significantly improve the ability for patient to participate in MRADLs and the patient will use it regularly (Daily) in her home. She has expressed a willingness to use the wheelchair.   - Continue pain management     Elevated LFTs  - Thought to be due to cymbalta per pain management? Currently tapering off.  - Tylenol discontinued  - RUQ  "ultrasound wnl.   - Re-check after cymbalta has been tapered off.    HTN  - BP readings are acceptable. Continue Norvasc 5 mg daily.    Depression/Anxiety  - Continue Cymbalta. Taper by 20 every other day. Taper to 40 mg on 8/7    DVT Prophylaxis: Lovenox     Barrier to discharge: TCU placement     Subjective:  Interval History:    Patient seen and examined.   No events overnight.  Again expresses disappointment with no plans for surgery. Patient states \"why are you not advocating for me\" \"Tell them they need to do surgery\" \" I cannot live like this\".       Review of Systems:   As mentioned in subjective.    Patient Active Problem List   Diagnosis     Enthesopathy of hip region     Backache     Cervicalgia     Melena     C3 cervical fracture (H)     ARJUN (acute kidney injury) (H)     Hyperkalemia     Hyponatremia     Abnormal LFTs     Asthma     Hepatic steatosis     Chronic low back pain     Depression     Fibromyalgia     HTN (hypertension)     Obesity     Acute kidney injury (H)     Closed fracture of transverse process of cervical vertebra, initial encounter (H)     Alcohol use disorder, severe, dependence (H)     Severe Osteoarthritis of right hip       Scheduled Meds:    amLODIPine  5 mg Oral Daily     DULoxetine  60 mg Oral At Bedtime     enoxaparin ANTICOAGULANT  40 mg Subcutaneous Q24H     folic acid  1 mg Oral Daily     gabapentin  1,200 mg Oral Q8H     lidocaine  3 patch Transdermal Q24H     lidocaine   Transdermal Q8H YULISA     multivitamin, therapeutic  1 tablet Oral Daily     pantoprazole  40 mg Oral Daily     thiamine  100 mg Oral Daily     vitamin D2  50,000 Units Oral Q7 Days     Continuous Infusions:  PRN Meds:.albuterol, calcium carbonate, fluticasone, hydrALAZINE, ketotifen, loratadine, melatonin, naloxone **OR** naloxone **OR** naloxone **OR** naloxone, ondansetron **OR** [DISCONTINUED] ondansetron, oxyCODONE, prochlorperazine **OR** prochlorperazine **OR** prochlorperazine    Objective:  Vital " signs in last 24 hours:  Temp:  [97.8  F (36.6  C)-99.3  F (37.4  C)] 99.3  F (37.4  C)  Pulse:  [80-98] 80  Resp:  [16-18] 18  BP: (122-142)/(65-82) 142/82  SpO2:  [93 %-98 %] 98 %        Intake/Output Summary (Last 24 hours) at 8/2/2022 1507  Last data filed at 8/2/2022 1300  Gross per 24 hour   Intake 2450 ml   Output 3650 ml   Net -1200 ml       Physical Exam:  General: In NAD.  HEENT: NCAT, EOMI  CV: Normal S1S2, regular rhythm, normal rate  Lungs: CTAB  Abdomen: Soft, NT, ND, +BS  Extremities: No LE edema b/l  Neuro: AAOx3.   Psych: Normal Affect.     Lab Results:    No results found for this or any previous visit (from the past 24 hour(s)).    Serum Glucose range:   No results for input(s): GLC, BGM in the last 168 hours.  ABG: No lab results found in last 7 days.  CBC:   Recent Labs   Lab 08/03/22  0551        Chemistry:   Recent Labs   Lab 08/03/22  0551 07/31/22  0517 07/30/22  1151   CR 0.74 0.78  --    GFRESTIMATED >90 88  --    PROTTOTAL 7.0  --  7.5   ALBUMIN 3.1*  --  3.4*   *  --  118*   *  --  271*   ALKPHOS 602*  --  387*   BILITOTAL 0.9  --  0.7     Coags:  No results for input(s): INR, PROTIME, PTT in the last 168 hours.    Invalid input(s): APTT  Cardiac Markers:  No results for input(s): CKTOTAL, TROPONINI in the last 168 hours.       XR Pelvis and Hip Right 2 Views    Result Date: 7/27/2022  EXAM: XR PELVIS AND HIP RIGHT 2 VIEWS LOCATION: [facility] DATE/TIME: 7/27/2022 9:23 PM INDICATION: Right hip pain. COMPARISON: None.     IMPRESSION: Acetabular dysplasia/hypoplasia, with a shallow acetabulum and a chronically flattened femoral head, with additional severe degenerative arthritic changes in the right hip, including complete joint space loss with subchondral sclerosis and cystic change on both sides of the joint. No acute fracture. No overt dislocation. Uncomplicated left total hip arthroplasty. Prior fusion at L5-S1. Advanced degenerative disc  changes at L4-L5. Mild degenerative changes in the lumbar spinal elsewhere.    XR Cervical Spine 2/3 Views    Result Date: 7/14/2022  EXAM: XR CERVICAL SPINE 2/3 VWS LOCATION: [facility] DATE/TIME: 7/14/2022 9:00 AM INDICATION:  C3 cervical fracture (H) COMPARISON: Plain films 06/29/2022. TECHNIQUE: CR Cervical Spine.     IMPRESSION: As before, the known right C3 transverse process fracture is not visualized with plain films. Vertebral body heights maintained with stable satisfactory lateral alignment. Stable intact anterior discectomy and fusion changes C4-C7. Stable facet degeneration throughout the cervical facets. Normal prevertebral tissues. Lung apices clear.           08/05/2022   Fransisca Brian MD  Hospitalist  Pager: [phone]

## 2022-08-06 ENCOUNTER — APPOINTMENT (OUTPATIENT)
Dept: OCCUPATIONAL THERAPY | Facility: HOSPITAL | Age: 57
End: 2022-08-06
Payer: COMMERCIAL

## 2022-08-06 LAB
CREAT SERPL-MCNC: 0.75 MG/DL (ref 0.6–1.1)
GFR SERPL CREATININE-BSD FRML MDRD: >90 ML/MIN/1.73M2
PLATELET # BLD AUTO: 313 10E3/UL (ref 150–450)

## 2022-08-06 PROCEDURE — G0378 HOSPITAL OBSERVATION PER HR: HCPCS

## 2022-08-06 PROCEDURE — 250N000013 HC RX MED GY IP 250 OP 250 PS 637: Performed by: INTERNAL MEDICINE

## 2022-08-06 PROCEDURE — 250N000013 HC RX MED GY IP 250 OP 250 PS 637: Performed by: HOSPITALIST

## 2022-08-06 PROCEDURE — 96372 THER/PROPH/DIAG INJ SC/IM: CPT | Performed by: HOSPITALIST

## 2022-08-06 PROCEDURE — 250N000013 HC RX MED GY IP 250 OP 250 PS 637: Performed by: PAIN MEDICINE

## 2022-08-06 PROCEDURE — 36415 COLL VENOUS BLD VENIPUNCTURE: CPT | Performed by: INTERNAL MEDICINE

## 2022-08-06 PROCEDURE — 97535 SELF CARE MNGMENT TRAINING: CPT | Mod: GO

## 2022-08-06 PROCEDURE — 250N000011 HC RX IP 250 OP 636: Performed by: HOSPITALIST

## 2022-08-06 PROCEDURE — 99224 PR SUBSEQUENT OBSERVATION CARE,LEVEL I: CPT | Performed by: HOSPITALIST

## 2022-08-06 PROCEDURE — 85049 AUTOMATED PLATELET COUNT: CPT | Performed by: HOSPITALIST

## 2022-08-06 PROCEDURE — 82565 ASSAY OF CREATININE: CPT | Performed by: INTERNAL MEDICINE

## 2022-08-06 RX ADMIN — ENOXAPARIN SODIUM 40 MG: 40 INJECTION SUBCUTANEOUS at 15:57

## 2022-08-06 RX ADMIN — OXYCODONE HYDROCHLORIDE 10 MG: 5 TABLET ORAL at 02:27

## 2022-08-06 RX ADMIN — OXYCODONE HYDROCHLORIDE 10 MG: 5 TABLET ORAL at 15:56

## 2022-08-06 RX ADMIN — FOLIC ACID 1 MG: 1 TABLET ORAL at 09:48

## 2022-08-06 RX ADMIN — PANTOPRAZOLE SODIUM 40 MG: 40 TABLET, DELAYED RELEASE ORAL at 09:49

## 2022-08-06 RX ADMIN — AMLODIPINE BESYLATE 5 MG: 5 TABLET ORAL at 09:48

## 2022-08-06 RX ADMIN — OXYCODONE HYDROCHLORIDE 10 MG: 5 TABLET ORAL at 09:48

## 2022-08-06 RX ADMIN — DULOXETINE HYDROCHLORIDE 60 MG: 60 CAPSULE, DELAYED RELEASE PELLETS ORAL at 22:10

## 2022-08-06 RX ADMIN — OXYCODONE HYDROCHLORIDE 10 MG: 5 TABLET ORAL at 22:09

## 2022-08-06 RX ADMIN — THIAMINE HCL TAB 100 MG 100 MG: 100 TAB at 09:48

## 2022-08-06 RX ADMIN — GABAPENTIN 1200 MG: 300 CAPSULE ORAL at 15:56

## 2022-08-06 RX ADMIN — GABAPENTIN 1200 MG: 300 CAPSULE ORAL at 22:10

## 2022-08-06 RX ADMIN — THERA TABS 1 TABLET: TAB at 09:48

## 2022-08-06 RX ADMIN — GABAPENTIN 1200 MG: 300 CAPSULE ORAL at 09:48

## 2022-08-06 RX ADMIN — LORATADINE 10 MG: 10 TABLET ORAL at 09:52

## 2022-08-06 NOTE — PROGRESS NOTES
Hospitalist Progress Note        CODE STATUS:  Full Code    Assessment/Plan:  56-year-old female with history of anxiety/depression, mood disorder, chronic low back pain, fibromyalgia, hypertension, hepatic steatosis, asthma, prior C3 transverse process fracture after fall and reflux esophagitis with benign-appearing esophageal stricture, nonbleeding duodenal ulcer and known hip OA presents with right hip pain.    Right Hip OA  - Right hip pain: X-ray pelvis and hip shows severe degenerative arthritic changes in the right hip, including complete joint space loss with subchondral sclerosis and cystic change   on both sides of the joint. Patient states she was supposed to have hip replacement in June but this was postponed due to hypertension.  - Followed by [facility]. No plan for surgery on this admission. Patient told to follow up outpatient to plan surgery. Ortho signed off. Patient daily requesting why Ortho does not consider surgery urgent.   - PT/OT, WBAT RLE . She has been awaiting TCU placement.  - Patient now wants to go home with home care. She is contact with [facility] and notified on 8/5/22 that she might be able to be scheduled in about a month.  - Patient has a mobility limitation that impairs the ability to accomplish mobility related activities of daily living safely and within a reasonable time frame due to severe osteoarthritis of right hip M16.11 and severe pain.   Patient's mobility cannot be resolved by the use of a cane or walker   And patient lives in a one level home that provides adequate access, maneuvering space and surfaces. The patient can safely propel the manual wheelchair or has a caregiver who can assist.   And the use of a manual wheelchair will significantly improve the ability for patient to participate in MRADLs and the patient will use it regularly (Daily) in her home. She has expressed a willingness to use the wheelchair.   - Continue pain management      Elevated LFTs  - Thought to be due to cymbalta per pain management? Currently tapering off.  - Tylenol discontinued  - RUQ ultrasound wnl.   - Re-check after cymbalta has been tapered off.    HTN  - BP readings are acceptable. Continue Norvasc 5 mg daily.    Depression/Anxiety  - Continue Cymbalta. Taper by 20 every other day. Taper to 40 mg on 8/7    DVT Prophylaxis: Lovenox     Barrier to discharge: TCU placement     Subjective:  Interval History:    Patient seen and examined.   No events overnight.  Feels comfortable with plan to go home on Monday.       Review of Systems:   As mentioned in subjective.    Patient Active Problem List   Diagnosis     Enthesopathy of hip region     Backache     Cervicalgia     Melena     C3 cervical fracture (H)     ARJUN (acute kidney injury) (H)     Hyperkalemia     Hyponatremia     Abnormal LFTs     Asthma     Hepatic steatosis     Chronic low back pain     Depression     Fibromyalgia     HTN (hypertension)     Obesity     Acute kidney injury (H)     Closed fracture of transverse process of cervical vertebra, initial encounter (H)     Alcohol use disorder, severe, dependence (H)     Severe Osteoarthritis of right hip       Scheduled Meds:    amLODIPine  5 mg Oral Daily     DULoxetine  60 mg Oral At Bedtime     enoxaparin ANTICOAGULANT  40 mg Subcutaneous Q24H     folic acid  1 mg Oral Daily     gabapentin  1,200 mg Oral Q8H     multivitamin, therapeutic  1 tablet Oral Daily     pantoprazole  40 mg Oral Daily     thiamine  100 mg Oral Daily     vitamin D2  50,000 Units Oral Q7 Days     Continuous Infusions:  PRN Meds:.albuterol, calcium carbonate, fluticasone, hydrALAZINE, ketotifen, loratadine, melatonin, naloxone **OR** naloxone **OR** naloxone **OR** naloxone, ondansetron **OR** [DISCONTINUED] ondansetron, oxyCODONE, prochlorperazine **OR** prochlorperazine **OR** prochlorperazine    Objective:  Vital signs in last 24 hours:  Temp:  [97.5  F (36.4  C)-98.8  F (37.1  C)] 97.5   F (36.4  C)  Pulse:  [75-84] 75  Resp:  [17-18] 17  BP: (133-143)/(65-76) 134/76  SpO2:  [92 %-94 %] 94 %        Intake/Output Summary (Last 24 hours) at 8/2/2022 1507  Last data filed at 8/2/2022 1300  Gross per 24 hour   Intake 2450 ml   Output 3650 ml   Net -1200 ml       Physical Exam:  General: In NAD.  HEENT: NCAT, EOMI  CV: Normal S1S2, regular rhythm, normal rate  Lungs: CTAB  Abdomen: Soft, NT, ND, +BS  Extremities: No LE edema b/l  Neuro: AAOx3.   Psych: Normal Affect.     Lab Results:    Recent Results (from the past 24 hour(s))   Creatinine    Collection Time: 08/06/22  5:47 AM   Result Value Ref Range    Creatinine 0.75 0.60 - 1.10 mg/dL    GFR Estimate >90 >60 mL/min/1.73m2   Platelet count    Collection Time: 08/06/22  5:47 AM   Result Value Ref Range    Platelet Count 313 150 - 450 10e3/uL       Serum Glucose range:   No results for input(s): GLC, BGM in the last 168 hours.  ABG: No lab results found in last 7 days.  CBC:   Recent Labs   Lab 08/06/22  0547 08/03/22  0551    367     Chemistry:   Recent Labs   Lab 08/06/22  0547 08/03/22  0551 07/31/22  0517   CR 0.75 0.74 0.78   GFRESTIMATED >90 >90 88   PROTTOTAL  --  7.0  --    ALBUMIN  --  3.1*  --    AST  --  303*  --    ALT  --  330*  --    ALKPHOS  --  602*  --    BILITOTAL  --  0.9  --      Coags:  No results for input(s): INR, PROTIME, PTT in the last 168 hours.    Invalid input(s): APTT  Cardiac Markers:  No results for input(s): CKTOTAL, TROPONINI in the last 168 hours.       XR Pelvis and Hip Right 2 Views    Result Date: 7/27/2022  EXAM: XR PELVIS AND HIP RIGHT 2 VIEWS LOCATION: [facility] DATE/TIME: 7/27/2022 9:23 PM INDICATION: Right hip pain. COMPARISON: None.     IMPRESSION: Acetabular dysplasia/hypoplasia, with a shallow acetabulum and a chronically flattened femoral head, with additional severe degenerative arthritic changes in the right hip, including complete joint space loss with subchondral  sclerosis and cystic change on both sides of the joint. No acute fracture. No overt dislocation. Uncomplicated left total hip arthroplasty. Prior fusion at L5-S1. Advanced degenerative disc changes at L4-L5. Mild degenerative changes in the lumbar spinal elsewhere.    XR Cervical Spine 2/3 Views    Result Date: 7/14/2022  EXAM: XR CERVICAL SPINE 2/3 VWS LOCATION: [facility] DATE/TIME: 7/14/2022 9:00 AM INDICATION:  C3 cervical fracture (H) COMPARISON: Plain films 06/29/2022. TECHNIQUE: CR Cervical Spine.     IMPRESSION: As before, the known right C3 transverse process fracture is not visualized with plain films. Vertebral body heights maintained with stable satisfactory lateral alignment. Stable intact anterior discectomy and fusion changes C4-C7. Stable facet degeneration throughout the cervical facets. Normal prevertebral tissues. Lung apices clear.           08/06/2022   Fransisca Brian MD  Hospitalist  Pager: [phone]

## 2022-08-06 NOTE — PLAN OF CARE
PRIMARY DIAGNOSIS: GENERALIZED WEAKNESS    OUTPATIENT/OBSERVATION GOALS TO BE MET BEFORE DISCHARGE  1. Orthostatic performed: No    2. Tolerating PO medications: Yes    3. Return to near baseline physical activity: No    4. Cleared for discharge by consultants (if involved): No    Discharge Planner Nurse   Safe discharge environment identified: Yes  Barriers to discharge: Yes - ability to manage at home       Entered by: Erica Mejia RN 08/06/2022 4:56 AM     Please review provider order for any additional goals.   Nurse to notify provider when observation goals have been met and patient is ready for discharge.Goal Outcome Evaluation:        Patient awakens easily to voice. Reports sleeping well. Denies the need for repositioning. Pain managed with PO medications. Looking forward to going home ASAP. Erica Mejia RN on 8/6/2022 at 5:04 AM

## 2022-08-06 NOTE — PLAN OF CARE
PRIMARY DIAGNOSIS: GENERALIZED WEAKNESS    OUTPATIENT/OBSERVATION GOALS TO BE MET BEFORE DISCHARGE  1. Orthostatic performed: NA    2. Tolerating PO medications: Yes    3. Return to near baseline physical activity: No    4. Cleared for discharge by consultants (if involved): No    Discharge Planner Nurse   Safe discharge environment identified: Yes  Barriers to discharge: Yes       Entered by: Edgardo Sanchez RN 08/05/2022 7:04 PM     Please review provider order for any additional goals.   Nurse to notify provider when observation goals have been met and patient is ready for discharge.Goal Outcome Evaluation:     Pt's pain manage with prn oxycodone

## 2022-08-06 NOTE — PLAN OF CARE
Pt requested oxycodone at 1555 for 9/10 pain to neck, right hip and left hand. Given Oxycodone PRN. Pt now comfortable.

## 2022-08-06 NOTE — PLAN OF CARE
PRIMARY DIAGNOSIS: GENERALIZED WEAKNESS    OUTPATIENT/OBSERVATION GOALS TO BE MET BEFORE DISCHARGE  1. Orthostatic performed: No    2. Tolerating PO medications: Yes    3. Return to near baseline physical activity: No    4. Cleared for discharge by consultants (if involved): Yes    Discharge Planner Nurse   Safe discharge environment identified: Yes  Barriers to discharge: No       Entered by: Erica Mejia RN 08/06/2022 5:05 AM     Please review provider order for any additional goals.   Nurse to notify provider when observation goals have been met and patient is ready for discharge.Goal Outcome Evaluation:           Patient alert and orientated. States pain in LUE and Right Hip. Pain managed with PO medications. Purewick in place. Erica Mejia RN on 8/6/2022

## 2022-08-07 ENCOUNTER — APPOINTMENT (OUTPATIENT)
Dept: PHYSICAL THERAPY | Facility: HOSPITAL | Age: 57
End: 2022-08-07
Payer: COMMERCIAL

## 2022-08-07 ENCOUNTER — APPOINTMENT (OUTPATIENT)
Dept: OCCUPATIONAL THERAPY | Facility: HOSPITAL | Age: 57
End: 2022-08-07
Payer: COMMERCIAL

## 2022-08-07 PROCEDURE — 97116 GAIT TRAINING THERAPY: CPT | Mod: GP

## 2022-08-07 PROCEDURE — 97535 SELF CARE MNGMENT TRAINING: CPT | Mod: GO

## 2022-08-07 PROCEDURE — 250N000013 HC RX MED GY IP 250 OP 250 PS 637: Performed by: INTERNAL MEDICINE

## 2022-08-07 PROCEDURE — 99207 PR CDG-CUT & PASTE-POTENTIAL IMPACT ON LEVEL: CPT | Performed by: HOSPITALIST

## 2022-08-07 PROCEDURE — 250N000011 HC RX IP 250 OP 636: Performed by: HOSPITALIST

## 2022-08-07 PROCEDURE — 96372 THER/PROPH/DIAG INJ SC/IM: CPT | Performed by: HOSPITALIST

## 2022-08-07 PROCEDURE — 97542 WHEELCHAIR MNGMENT TRAINING: CPT | Mod: GP

## 2022-08-07 PROCEDURE — G0378 HOSPITAL OBSERVATION PER HR: HCPCS

## 2022-08-07 PROCEDURE — 250N000013 HC RX MED GY IP 250 OP 250 PS 637: Performed by: HOSPITALIST

## 2022-08-07 PROCEDURE — 99224 PR SUBSEQUENT OBSERVATION CARE,LEVEL I: CPT | Performed by: HOSPITALIST

## 2022-08-07 PROCEDURE — 250N000013 HC RX MED GY IP 250 OP 250 PS 637: Performed by: PAIN MEDICINE

## 2022-08-07 RX ORDER — OXYCODONE HYDROCHLORIDE 10 MG/1
10 TABLET ORAL EVERY 6 HOURS PRN
Qty: 42 TABLET | Refills: 0 | Status: CANCELLED | OUTPATIENT
Start: 2022-08-07 | End: 2022-08-21

## 2022-08-07 RX ORDER — DULOXETIN HYDROCHLORIDE 20 MG/1
40 CAPSULE, DELAYED RELEASE ORAL AT BEDTIME
Status: DISCONTINUED | OUTPATIENT
Start: 2022-08-07 | End: 2022-08-08 | Stop reason: HOSPADM

## 2022-08-07 RX ADMIN — LORATADINE 10 MG: 10 TABLET ORAL at 08:19

## 2022-08-07 RX ADMIN — GABAPENTIN 1200 MG: 300 CAPSULE ORAL at 14:01

## 2022-08-07 RX ADMIN — ENOXAPARIN SODIUM 40 MG: 40 INJECTION SUBCUTANEOUS at 22:00

## 2022-08-07 RX ADMIN — GABAPENTIN 1200 MG: 300 CAPSULE ORAL at 22:00

## 2022-08-07 RX ADMIN — FOLIC ACID 1 MG: 1 TABLET ORAL at 08:19

## 2022-08-07 RX ADMIN — GABAPENTIN 1200 MG: 300 CAPSULE ORAL at 08:19

## 2022-08-07 RX ADMIN — THIAMINE HCL TAB 100 MG 100 MG: 100 TAB at 08:19

## 2022-08-07 RX ADMIN — OXYCODONE HYDROCHLORIDE 10 MG: 5 TABLET ORAL at 14:01

## 2022-08-07 RX ADMIN — PANTOPRAZOLE SODIUM 40 MG: 40 TABLET, DELAYED RELEASE ORAL at 08:19

## 2022-08-07 RX ADMIN — OXYCODONE HYDROCHLORIDE 10 MG: 5 TABLET ORAL at 19:52

## 2022-08-07 RX ADMIN — OXYCODONE HYDROCHLORIDE 10 MG: 5 TABLET ORAL at 04:01

## 2022-08-07 RX ADMIN — THERA TABS 1 TABLET: TAB at 08:19

## 2022-08-07 RX ADMIN — AMLODIPINE BESYLATE 5 MG: 5 TABLET ORAL at 08:19

## 2022-08-07 RX ADMIN — DULOXETINE 40 MG: 20 CAPSULE, DELAYED RELEASE ORAL at 22:00

## 2022-08-07 NOTE — PLAN OF CARE
"  Problem: Plan of Care - These are the overarching goals to be used throughout the patient stay.    Goal: Plan of Care Review/Shift Note  Description: The Plan of Care Review/Shift note should be completed every shift.  The Outcome Evaluation is a brief statement about your assessment that the patient is improving, declining, or no change.  This information will be displayed automatically on your shift note.  Outcome: Ongoing, Progressing  Flowsheets (Taken 8/7/2022 0437)  Plan of Care Reviewed With: patient  Overall Patient Progress: improving  Goal: Patient-Specific Goal (Individualized)  Description: You can add care plan individualizations to a care plan. Examples of Individualization might be:  \"Parent requests to be called daily at 9am for status\", \"I have a hard time hearing out of my right ear\", or \"Do not touch me to wake me up as it startles me\".  Outcome: Ongoing, Progressing  Goal: Absence of Hospital-Acquired Illness or Injury  Outcome: Ongoing, Progressing  Intervention: Identify and Manage Fall Risk  Recent Flowsheet Documentation  Taken 8/7/2022 0000 by Erica Mejia RN  Safety Promotion/Fall Prevention:   bed alarm on   clutter free environment maintained   lighting adjusted   patient and family education   nonskid shoes/slippers when out of bed  Taken 8/6/2022 2000 by Erica Mejia RN  Safety Promotion/Fall Prevention:   bed alarm on   clutter free environment maintained   lighting adjusted   patient and family education   nonskid shoes/slippers when out of bed  Intervention: Prevent Skin Injury  Recent Flowsheet Documentation  Taken 8/7/2022 0100 by Erica Mejia RN  Body Position: position changed independently  Taken 8/7/2022 0000 by Erica Mejia RN  Body Position: position maintained  Taken 8/6/2022 2000 by Eriac Mejia RN  Body Position: position maintained  Intervention: Prevent and Manage VTE (Venous Thromboembolism) Risk  Recent Flowsheet Documentation  Taken 8/7/2022 0100 by " Roberto, Erica, RN  Activity Management: bedrest  Taken 8/7/2022 0000 by Erica Mejia RN  Activity Management: bedrest  Taken 8/6/2022 2000 by Erica Mejia RN  Activity Management: bedrest  Goal: Optimal Comfort and Wellbeing  Outcome: Ongoing, Progressing  Intervention: Monitor Pain and Promote Comfort  Recent Flowsheet Documentation  Taken 8/7/2022 0100 by Erica Mejia RN  Pain Management Interventions:   medication (see MAR)   distraction  Taken 8/6/2022 2230 by Erica Mejia RN  Pain Management Interventions:   medication (see MAR)   distraction  Taken 8/6/2022 2209 by Erica Mejia RN  Pain Management Interventions:   medication (see MAR)   distraction  Taken 8/6/2022 2000 by Erica Mejia RN  Pain Management Interventions:   medication (see MAR)   distraction  Intervention: Provide Person-Centered Care  Recent Flowsheet Documentation  Taken 8/7/2022 0000 by Erica Mejia RN  Trust Relationship/Rapport:   care explained   emotional support provided   questions encouraged   thoughts/feelings acknowledged  Taken 8/6/2022 2000 by Erica Mejia RN  Trust Relationship/Rapport:   care explained   emotional support provided   questions encouraged   thoughts/feelings acknowledged  Goal: Readiness for Transition of Care  Outcome: Ongoing, Progressing     Problem: Pain Acute  Goal: Acceptable Pain Control and Functional Ability  Outcome: Ongoing, Progressing  Intervention: Develop Pain Management Plan  Recent Flowsheet Documentation  Taken 8/7/2022 0100 by Erica Mejia RN  Pain Management Interventions:   medication (see MAR)   distraction  Taken 8/6/2022 2230 by Erica Mejia RN  Pain Management Interventions:   medication (see MAR)   distraction  Taken 8/6/2022 2209 by Erica Mejia RN  Pain Management Interventions:   medication (see MAR)   distraction  Taken 8/6/2022 2000 by Erica Mejia RN  Pain Management Interventions:   medication (see MAR)   distraction  Intervention: Prevent or  Manage Pain  Recent Flowsheet Documentation  Taken 8/7/2022 0000 by Erica Mejia RN  Medication Review/Management: medications reviewed  Taken 8/6/2022 2000 by Erica Mejia RN  Medication Review/Management: medications reviewed     Problem: Mobility Impairment  Goal: Optimal Mobility  Outcome: Ongoing, Progressing  Intervention: Optimize Mobility  Recent Flowsheet Documentation  Taken 8/7/2022 0100 by Erica Mejia RN  Activity Management: bedrest  Positioning/Transfer Devices:   pillows   in use  Taken 8/7/2022 0000 by Erica Mejia RN  Activity Management: bedrest  Positioning/Transfer Devices:   pillows   in use  Taken 8/6/2022 2000 by Erica Mejia RN  Activity Management: bedrest  Positioning/Transfer Devices:   pillows   in use     Problem: Fall Injury Risk  Goal: Absence of Fall and Fall-Related Injury  Outcome: Ongoing, Progressing  Intervention: Identify and Manage Contributors  Recent Flowsheet Documentation  Taken 8/7/2022 0000 by Erica Mejia RN  Medication Review/Management: medications reviewed  Taken 8/6/2022 2000 by Erica Mejia RN  Medication Review/Management: medications reviewed  Intervention: Promote Injury-Free Environment  Recent Flowsheet Documentation  Taken 8/7/2022 0000 by Erica Mejia RN  Safety Promotion/Fall Prevention:   bed alarm on   clutter free environment maintained   lighting adjusted   patient and family education   nonskid shoes/slippers when out of bed  Taken 8/6/2022 2000 by Erica Mejia RN  Safety Promotion/Fall Prevention:   bed alarm on   clutter free environment maintained   lighting adjusted   patient and family education   nonskid shoes/slippers when out of bed   Goal Outcome Evaluation:    Plan of Care Reviewed With: patient     Overall Patient Progress: improving       Pt continues to be in good spirits. States pain being controlled with PO oxy PRN. Denies shortness of breath. CNS intact BLE. States going to do PT outpatient and wanting to get  stronger. Provided emotional support and encouraged pt to share concerns. Erica Mejia RN on 8/7/2022 at 4:40 AM

## 2022-08-07 NOTE — PLAN OF CARE
PRIMARY DIAGNOSIS: GENERALIZED WEAKNESS    OUTPATIENT/OBSERVATION GOALS TO BE MET BEFORE DISCHARGE  1. Orthostatic performed: No    2. Tolerating PO medications: Yes    3. Return to near baseline physical activity: Yes    4. Cleared for discharge by consultants (if involved): Yes    Discharge Planner Nurse   Safe discharge environment identified: Yes  Barriers to discharge: No       Entered by: Erica Mejia RN 08/06/2022 9:55 PM     Please review provider order for any additional goals.   Nurse to notify provider when observation goals have been met and patient is ready for discharge.Goal Outcome Evaluation:        Patient alert and orientated. States pain continues in right hip and left hand. All CNS in tact. Denies shortness of breath. Continues to talk about wanting to do all cares with supervision and wants to go home. Provided conversation and encouraged pt. Will monitor during shift with increased observation and encourage call light use. Erica Mejia RN on 8/6/2022 at 9:57 PM

## 2022-08-07 NOTE — PROGRESS NOTES
MD update, pt requesting platform walker.     RNCM spoke with Halina, OT will update colleague Tiesha who is scheduled to see patient after lunch. Per therapy department, hospital/therapy department now has platform walkers to issue to patient on day on discharge (to bill to insurance if medically recommended).

## 2022-08-07 NOTE — PROGRESS NOTES
Hospitalist Progress Note        CODE STATUS:  Full Code    Assessment/Plan:  56-year-old female with history of anxiety/depression, mood disorder, chronic low back pain, fibromyalgia, hypertension, hepatic steatosis, asthma, prior C3 transverse process fracture after fall and reflux esophagitis with benign-appearing esophageal stricture, nonbleeding duodenal ulcer and known hip OA presents with right hip pain.    Right Hip OA  - Right hip pain: X-ray pelvis and hip shows severe degenerative arthritic changes in the right hip, including complete joint space loss with subchondral sclerosis and cystic change   on both sides of the joint. Patient states she was supposed to have hip replacement in June but this was postponed due to hypertension.  - Followed by [facility]. No plan for surgery on this admission. Patient told to follow up outpatient to plan surgery. Ortho signed off. Patient daily requesting why Ortho does not consider surgery urgent.   - PT/OT, WBAT RLE . She has been awaiting TCU placement.  - Patient now wants to go home with home care. She is contact with [facility] and notified on 8/5/22 that she might be able to be scheduled in about a month.  - Patient has a mobility limitation that impairs the ability to accomplish mobility related activities of daily living safely and within a reasonable time frame due to severe osteoarthritis of right hip M16.11 and severe pain.   Patient's mobility cannot be resolved by the use of a cane or walker   And patient lives in a one level home that provides adequate access, maneuvering space and surfaces. The patient can safely propel the manual wheelchair or has a caregiver who can assist.   And the use of a manual wheelchair will significantly improve the ability for patient to participate in MRADLs and the patient will use it regularly (Daily) in her home. She has expressed a willingness to use the wheelchair.   - Continue pain management      Elevated LFTs  - Thought to be due to cymbalta per pain management? Currently tapering off.  - Tylenol discontinued  - RUQ ultrasound wnl.   - Re-check after cymbalta has been tapered off.    HTN  - BP readings are acceptable. Continue Norvasc 5 mg daily.    Depression/Anxiety  - Continue Cymbalta. Taper by 20 every other day. Taper to 40 mg on 8/7    DVT Prophylaxis: Lovenox     Barrier to discharge: TCU placement     Subjective:  Interval History:    Patient seen and examined.   Planned discharge for tomorrow.        Review of Systems:   As mentioned in subjective.    Patient Active Problem List   Diagnosis     Enthesopathy of hip region     Backache     Cervicalgia     Melena     C3 cervical fracture (H)     ARJUN (acute kidney injury) (H)     Hyperkalemia     Hyponatremia     Abnormal LFTs     Asthma     Hepatic steatosis     Chronic low back pain     Depression     Fibromyalgia     HTN (hypertension)     Obesity     Acute kidney injury (H)     Closed fracture of transverse process of cervical vertebra, initial encounter (H)     Alcohol use disorder, severe, dependence (H)     Severe Osteoarthritis of right hip       Scheduled Meds:    amLODIPine  5 mg Oral Daily     DULoxetine  60 mg Oral At Bedtime     enoxaparin ANTICOAGULANT  40 mg Subcutaneous Q24H     folic acid  1 mg Oral Daily     gabapentin  1,200 mg Oral Q8H     multivitamin, therapeutic  1 tablet Oral Daily     pantoprazole  40 mg Oral Daily     thiamine  100 mg Oral Daily     vitamin D2  50,000 Units Oral Q7 Days     Continuous Infusions:  PRN Meds:.albuterol, calcium carbonate, fluticasone, hydrALAZINE, ketotifen, loratadine, melatonin, naloxone **OR** naloxone **OR** naloxone **OR** naloxone, ondansetron **OR** [DISCONTINUED] ondansetron, oxyCODONE, prochlorperazine **OR** prochlorperazine **OR** prochlorperazine    Objective:  Vital signs in last 24 hours:  Temp:  [97.5  F (36.4  C)-98.9  F (37.2  C)] 97.5  F (36.4  C)  Pulse:  [74-83] 74  Resp:   [18] 18  BP: (115-134)/(68-79) 128/71  SpO2:  [95 %-98 %] 95 %        Intake/Output Summary (Last 24 hours) at 8/2/2022 1507  Last data filed at 8/2/2022 1300  Gross per 24 hour   Intake 2450 ml   Output 3650 ml   Net -1200 ml       Physical Exam:  General: In NAD.  HEENT: NCAT, EOMI  CV: Normal S1S2, regular rhythm, normal rate  Lungs: CTAB  Abdomen: Soft, NT, ND, +BS  Extremities: No LE edema b/l  Neuro: AAOx3.   Psych: Normal Affect.     Lab Results:    No results found for this or any previous visit (from the past 24 hour(s)).    Serum Glucose range:   No results for input(s): GLC, BGM in the last 168 hours.  ABG: No lab results found in last 7 days.  CBC:   Recent Labs   Lab 08/06/22  0547 08/03/22  0551    367     Chemistry:   Recent Labs   Lab 08/06/22  0547 08/03/22  0551   CR 0.75 0.74   GFRESTIMATED >90 >90   PROTTOTAL  --  7.0   ALBUMIN  --  3.1*   AST  --  303*   ALT  --  330*   ALKPHOS  --  602*   BILITOTAL  --  0.9     Coags:  No results for input(s): INR, PROTIME, PTT in the last 168 hours.    Invalid input(s): APTT  Cardiac Markers:  No results for input(s): CKTOTAL, TROPONINI in the last 168 hours.       XR Pelvis and Hip Right 2 Views    Result Date: 7/27/2022  EXAM: XR PELVIS AND HIP RIGHT 2 VIEWS LOCATION: [facility] DATE/TIME: 7/27/2022 9:23 PM INDICATION: Right hip pain. COMPARISON: None.     IMPRESSION: Acetabular dysplasia/hypoplasia, with a shallow acetabulum and a chronically flattened femoral head, with additional severe degenerative arthritic changes in the right hip, including complete joint space loss with subchondral sclerosis and cystic change on both sides of the joint. No acute fracture. No overt dislocation. Uncomplicated left total hip arthroplasty. Prior fusion at L5-S1. Advanced degenerative disc changes at L4-L5. Mild degenerative changes in the lumbar spinal elsewhere.    XR Cervical Spine 2/3 Views    Result Date: 7/14/2022  EXAM: XR CERVICAL  SPINE 2/3 S LOCATION: [facility] DATE/TIME: 7/14/2022 9:00 AM INDICATION:  C3 cervical fracture (H) COMPARISON: Plain films 06/29/2022. TECHNIQUE: CR Cervical Spine.     IMPRESSION: As before, the known right C3 transverse process fracture is not visualized with plain films. Vertebral body heights maintained with stable satisfactory lateral alignment. Stable intact anterior discectomy and fusion changes C4-C7. Stable facet degeneration throughout the cervical facets. Normal prevertebral tissues. Lung apices clear.           08/07/2022   Fransisca Brian MD  Hospitalist  Pager: [phone]

## 2022-08-07 NOTE — PLAN OF CARE
Pt working with PT and up with walker. Walked 30 ft per Pt report. Pain after PT to right hip and back after PT. Given oxycodone x1 and scheduled gabapentin. Plan for Pt to discharge home Monday morning around 10a. Son to transport. PT with bring a walker with arm rest Monday morning for Pt to take home.

## 2022-08-08 VITALS
DIASTOLIC BLOOD PRESSURE: 73 MMHG | HEIGHT: 63 IN | WEIGHT: 149.3 LBS | TEMPERATURE: 98.2 F | OXYGEN SATURATION: 92 % | BODY MASS INDEX: 26.45 KG/M2 | RESPIRATION RATE: 18 BRPM | SYSTOLIC BLOOD PRESSURE: 134 MMHG | HEART RATE: 77 BPM

## 2022-08-08 PROCEDURE — G0378 HOSPITAL OBSERVATION PER HR: HCPCS

## 2022-08-08 PROCEDURE — 99217 PR OBSERVATION CARE DISCHARGE: CPT | Performed by: HOSPITALIST

## 2022-08-08 PROCEDURE — 250N000013 HC RX MED GY IP 250 OP 250 PS 637: Performed by: PAIN MEDICINE

## 2022-08-08 PROCEDURE — 250N000013 HC RX MED GY IP 250 OP 250 PS 637: Performed by: INTERNAL MEDICINE

## 2022-08-08 RX ORDER — GABAPENTIN 400 MG/1
1200 CAPSULE ORAL EVERY 8 HOURS
Qty: 270 CAPSULE | Refills: 0 | Status: SHIPPED | OUTPATIENT
Start: 2022-08-08 | End: 2022-09-07

## 2022-08-08 RX ORDER — ERGOCALCIFEROL 1.25 MG/1
50000 CAPSULE, LIQUID FILLED ORAL WEEKLY
Qty: 4 CAPSULE | Refills: 0 | Status: SHIPPED | OUTPATIENT
Start: 2022-08-11 | End: 2022-09-02

## 2022-08-08 RX ORDER — DULOXETIN HYDROCHLORIDE 30 MG/1
30 CAPSULE, DELAYED RELEASE ORAL AT BEDTIME
Qty: 4 CAPSULE | Refills: 0 | Status: SHIPPED | OUTPATIENT
Start: 2022-08-10 | End: 2022-08-14

## 2022-08-08 RX ORDER — AMLODIPINE BESYLATE 5 MG/1
5 TABLET ORAL DAILY
Qty: 30 TABLET | Refills: 0 | Status: SHIPPED | OUTPATIENT
Start: 2022-08-08 | End: 2022-09-07

## 2022-08-08 RX ORDER — DULOXETIN HYDROCHLORIDE 20 MG/1
20 CAPSULE, DELAYED RELEASE ORAL AT BEDTIME
Qty: 2 CAPSULE | Refills: 0 | Status: SHIPPED | OUTPATIENT
Start: 2022-08-14 | End: 2022-08-16

## 2022-08-08 RX ORDER — DULOXETINE 40 MG/1
40 CAPSULE, DELAYED RELEASE ORAL AT BEDTIME
Qty: 2 CAPSULE | Refills: 0 | Status: SHIPPED | OUTPATIENT
Start: 2022-08-08 | End: 2022-08-10

## 2022-08-08 RX ADMIN — THIAMINE HCL TAB 100 MG 100 MG: 100 TAB at 08:52

## 2022-08-08 RX ADMIN — FOLIC ACID 1 MG: 1 TABLET ORAL at 08:52

## 2022-08-08 RX ADMIN — OXYCODONE HYDROCHLORIDE 10 MG: 5 TABLET ORAL at 02:06

## 2022-08-08 RX ADMIN — GABAPENTIN 1200 MG: 300 CAPSULE ORAL at 05:54

## 2022-08-08 RX ADMIN — THERA TABS 1 TABLET: TAB at 08:52

## 2022-08-08 RX ADMIN — OXYCODONE HYDROCHLORIDE 10 MG: 5 TABLET ORAL at 08:52

## 2022-08-08 RX ADMIN — AMLODIPINE BESYLATE 5 MG: 5 TABLET ORAL at 08:52

## 2022-08-08 RX ADMIN — PANTOPRAZOLE SODIUM 40 MG: 40 TABLET, DELAYED RELEASE ORAL at 08:52

## 2022-08-08 NOTE — PLAN OF CARE
"  Problem: Plan of Care - These are the overarching goals to be used throughout the patient stay.    Goal: Plan of Care Review/Shift Note  Description: The Plan of Care Review/Shift note should be completed every shift.  The Outcome Evaluation is a brief statement about your assessment that the patient is improving, declining, or no change.  This information will be displayed automatically on your shift note.  Outcome: Ongoing, Progressing  Flowsheets (Taken 8/8/2022 0510)  Plan of Care Reviewed With: patient  Overall Patient Progress: improving  Goal: Patient-Specific Goal (Individualized)  Description: You can add care plan individualizations to a care plan. Examples of Individualization might be:  \"Parent requests to be called daily at 9am for status\", \"I have a hard time hearing out of my right ear\", or \"Do not touch me to wake me up as it startles me\".  Outcome: Ongoing, Progressing  Goal: Absence of Hospital-Acquired Illness or Injury  Outcome: Ongoing, Progressing  Intervention: Identify and Manage Fall Risk  Recent Flowsheet Documentation  Taken 8/7/2022 2015 by Erica Mejia RN  Safety Promotion/Fall Prevention:   room organization consistent   patient and family education   nonskid shoes/slippers when out of bed   clutter free environment maintained  Intervention: Prevent Skin Injury  Recent Flowsheet Documentation  Taken 8/7/2022 1952 by Erica Mejia RN  Body Position: position changed independently  Intervention: Prevent and Manage VTE (Venous Thromboembolism) Risk  Recent Flowsheet Documentation  Taken 8/7/2022 1952 by Erica Mejia RN  Activity Management: bedrest  Goal: Optimal Comfort and Wellbeing  Outcome: Ongoing, Progressing  Intervention: Monitor Pain and Promote Comfort  Recent Flowsheet Documentation  Taken 8/8/2022 0410 by Erica Mejia RN  Pain Management Interventions: medication (see MAR)  Taken 8/7/2022 2015 by Erica Mejia RN  Pain Management Interventions:   repositioned   " cold applied   medication (see MAR)  Taken 8/7/2022 1952 by Erica Mejia RN  Pain Management Interventions:   repositioned   cold applied   medication (see MAR)  Intervention: Provide Person-Centered Care  Recent Flowsheet Documentation  Taken 8/7/2022 2015 by Erica Mejia RN  Trust Relationship/Rapport:   choices provided   questions encouraged  Goal: Readiness for Transition of Care  Outcome: Ongoing, Progressing     Problem: Pain Acute  Goal: Acceptable Pain Control and Functional Ability  Outcome: Ongoing, Progressing  Intervention: Develop Pain Management Plan  Recent Flowsheet Documentation  Taken 8/8/2022 0410 by Erica Mejia RN  Pain Management Interventions: medication (see MAR)  Taken 8/7/2022 2015 by Erica Mejia RN  Pain Management Interventions:   repositioned   cold applied   medication (see MAR)  Taken 8/7/2022 1952 by Erica Mejia RN  Pain Management Interventions:   repositioned   cold applied   medication (see MAR)  Intervention: Prevent or Manage Pain  Recent Flowsheet Documentation  Taken 8/7/2022 2015 by Erica Mejia RN  Medication Review/Management: medications reviewed     Problem: Mobility Impairment  Goal: Optimal Mobility  Outcome: Ongoing, Progressing  Intervention: Optimize Mobility  Recent Flowsheet Documentation  Taken 8/7/2022 1952 by Erica Mejia RN  Activity Management: bedrest  Positioning/Transfer Devices:   pillows   in use     Problem: Fall Injury Risk  Goal: Absence of Fall and Fall-Related Injury  Outcome: Ongoing, Progressing  Intervention: Identify and Manage Contributors  Recent Flowsheet Documentation  Taken 8/7/2022 2015 by Erica Mejia RN  Medication Review/Management: medications reviewed  Intervention: Promote Injury-Free Environment  Recent Flowsheet Documentation  Taken 8/7/2022 2015 by Erica Mejia RN  Safety Promotion/Fall Prevention:   room organization consistent   patient and family education   nonskid shoes/slippers when out of bed    clutter free environment maintained   Goal Outcome Evaluation:    Plan of Care Reviewed With: patient     Overall Patient Progress: improving       Patient alert and orientated. Denies shortness of breath. Reports pain in right hip and left hand. Po medications given. CNS intact. Eats ice chips continually throughout shift. Slept between cares. Pt son will drive her home after discharge. Provided and encouraged conversation. No concerns at this time. Erica Mejia RN on 8/8/2022 at 5:15 AM

## 2022-08-08 NOTE — PLAN OF CARE
PRIMARY DIAGNOSIS: ACUTE PAIN  OUTPATIENT/OBSERVATION GOALS TO BE MET BEFORE DISCHARGE:  1. Pain Status: Improved-controlled with oral pain medications.    2. Return to near baseline physical activity: No    3. Cleared for discharge by consultants (if involved): Yes    Discharge Planner Nurse   Safe discharge environment identified: Yes  Barriers to discharge: No  Discharged home with home care services.        Entered by: Melly Queen RN 08/08/2022 10:40 AM     Please review provider order for any additional goals.   Nurse to notify provider when observation goals have been met and patient is ready for discharge.Goal Outcome Evaluation:        Patient alert and oriented. Patient had complaints of pain in right hip. PRN oxycodone given around 0900. Patient ok do discharge home with homecare services. Escorted to front entrance upon discharge. Melly Queen RN

## 2022-08-08 NOTE — PROGRESS NOTES
Care Management Discharge Note    Discharge Date: 08/08/2022       Discharge Disposition: Home Care    Discharge Services:      Discharge DME: Wheelchair, Walker    Discharge Transportation: car, drives self    Private pay costs discussed: Not applicable    PAS Confirmation Code:    Patient/family educated on Medicare website which has current facility and service quality ratings:      Education Provided on the Discharge Plan:    Persons Notified of Discharge Plans: patient   Patient/Family in Agreement with the Plan:      Handoff Referral Completed: Yes    Additional Information:  See below         Roxie Collado RN          Spoke to patient via telephone. She says she is prepared to go home today, has received her wheelchair and son can assist. Agrees to home care. Orders faxed to [facility]. Patient says son will transport     Vera Wallace at [employer] [employer]

## 2022-08-08 NOTE — PLAN OF CARE
Physical Therapy Discharge Summary    Reason for therapy discharge:    Discharged to home with home therapy.    Progress towards therapy goal(s). See goals on Care Plan in [facility] electronic health record for goal details.  Goals partially met.  Barriers to achieving goals:   discharge from facility.    Therapy recommendation(s):    Continued therapy is recommended.  Rationale/Recommendations:  Continue PT with home PT.

## 2022-08-08 NOTE — PLAN OF CARE
Occupational Therapy Discharge Summary    Reason for therapy discharge:    Discharged to home with home therapy.    Progress towards therapy goal(s). See goals on Care Plan in [facility] electronic health record for goal details.  Goals partially met.  Barriers to achieving goals:   discharge from facility.    Therapy recommendation(s):    Continued therapy is recommended.  Rationale/Recommendations:  continue OT at home.

## 2022-08-08 NOTE — DISCHARGE SUMMARY
[facility] MEDICINE  DISCHARGE SUMMARY     Primary Care Physician: Kika Leblanc  Admission Date: 7/27/2022   Discharge Provider: Fransisca Brian MD Discharge Date: 8/8/2022   Diet: Regular diet   Code Status: Full Code   Activity: DCACTIVITY: Activity as tolerated        Condition at Discharge: Stable     REASON FOR PRESENTATION(See Admission Note for Details)   Right Hip Pain     PRINCIPAL & ACTIVE DISCHARGE DIAGNOSES     Principal Problem:    Severe Osteoarthritis of right hip  Active Problems:    Cervicalgia    Asthma    Hepatic steatosis    Chronic low back pain    Depression    Fibromyalgia    HTN (hypertension)    Obesity    Alcohol use disorder, severe, dependence (H)      PENDING LABS     Unresulted Labs Ordered in the Past 30 Days of this Admission     No orders found from 6/27/2022 to 7/28/2022.            PROCEDURES ( this hospitalization only)          RECOMMENDATIONS TO OUTPATIENT PROVIDER FOR F/U VISIT     Follow-up Appointments     Follow Up Care      Please follow-up with Dr. Montiel/Isabella Felipe PA-C in 2 weeks at [facility]. Call our scheduling line at [phone] to make an   appointment if you do not already have one scheduled.         Follow-up and recommended labs and tests       Follow up with primary care provider, Kika Leblanc, within 7 days to   evaluate medication change, to evaluate treatment change, for hospital   follow- up, and regarding new diagnosis.  The following labs/tests are   recommended: Liver Function Tests after cymbalta has been tapered off.   Check  Vitamin D Levels in September after replacement.     Follow up with Orthopedic Surgery regarding surgery planning.                 DISPOSITION     Home with home care    SUMMARY OF HOSPITAL COURSE:    56-year-old female with history of anxiety/depression, mood disorder, chronic low back pain, fibromyalgia, hypertension, hepatic steatosis, asthma, prior C3 transverse process  fracture after fall and reflux esophagitis with benign-appearing esophageal stricture, nonbleeding duodenal ulcer and known hip OA presents with right hip pain.     Right Hip OA  - Right hip pain: X-ray pelvis and hip shows severe degenerative arthritic changes in the right hip, including complete joint space loss with subchondral sclerosis and cystic change   on both sides of the joint. Patient states she was supposed to have hip replacement in June but this was postponed due to hypertension.  - Followed by [facility]. No plan for surgery on this admission. Patient told to follow up outpatient to plan surgery. Ortho signed off. Patient daily requesting why Ortho does not consider surgery urgent.   - PT/OT, [facility] RLE . She has been awaiting TCU placement.  - Patient now wants to go home with home care. She is contact with [facility] and notified on 8/5/22 that she might be able to be scheduled in about a month.  - Wheelchair ordered and delivered to her home. She will be discharged with a walker by PT.     Elevated LFTs  - Thought to be due to cymbalta per pain management? Currently tapering off.  - Tylenol discontinued  - RUQ ultrasound wnl.   - Re-check after cymbalta has been tapered off.     HTN  - BP readings are acceptable. Continue Norvasc 5 mg daily.     Depression/Anxiety  - Continue Cymbalta taper as outpatient. Taper outlined in discharge instructions.     Discharge Medications with Med changes:     Current Discharge Medication List      START taking these medications    Details   amLODIPine (NORVASC) 5 MG tablet Take 1 tablet (5 mg) by mouth daily for 30 days  Qty: 30 tablet, Refills: 0    Associated Diagnoses: Primary hypertension      vitamin D2 (ERGOCALCIFEROL) 24258 units (1250 mcg) capsule Take 1 capsule (50,000 Units) by mouth once a week for 4 doses Take every Thursday  Qty: 4 capsule, Refills: 0    Associated Diagnoses: Hypovitaminosis D         CONTINUE these medications which  have CHANGED    Details   !! DULoxetine (CYMBALTA) 20 MG capsule Take 1 capsule (20 mg) by mouth At Bedtime for 2 days - Take Cymbalta 20 mg 8/14 - 8/15 and then stop  Qty: 2 capsule, Refills: 0    Associated Diagnoses: Depression, unspecified depression type      !! DULoxetine (CYMBALTA) 30 MG capsule Take 1 capsule (30 mg) by mouth At Bedtime for 4 days - Take Cymbalta 30 mg 8/10 - 8/13  Qty: 4 capsule, Refills: 0    Associated Diagnoses: Depression, unspecified depression type      !! DULoxetine 40 MG CPEP Take 40 mg by mouth At Bedtime for 2 days - Take Cymbalta 40 mg 8/8 - 8/9  Qty: 2 capsule, Refills: 0    Associated Diagnoses: Depression, unspecified depression type      gabapentin (NEURONTIN) 400 MG capsule Take 3 capsules (1,200 mg) by mouth every 8 hours for 30 days  Qty: 270 capsule, Refills: 0    Associated Diagnoses: Primary osteoarthritis of right hip       !! - Potential duplicate medications found. Please discuss with provider.      CONTINUE these medications which have NOT CHANGED    Details   albuterol (PROAIR HFA/PROVENTIL HFA/VENTOLIN HFA) 108 (90 Base) MCG/ACT inhaler Inhale 2 puffs into the lungs every 4 hours as needed      fluticasone (FLONASE) 50 MCG/ACT nasal spray Spray 2 sprays in nostril daily as needed      folic acid (FOLVITE) 1 MG tablet Take 1 tablet (1 mg) by mouth daily  Qty: 30 tablet, Refills: 0    Associated Diagnoses: Acute kidney injury (H); Abnormal LFTs; Closed nondisplaced fracture of third cervical vertebra with routine healing, unspecified fracture morphology, subsequent encounter      ketotifen (ZADITOR) 0.025 % ophthalmic solution Place 1 drop into both eyes 2 times daily as needed      loratadine (CLARITIN) 10 MG tablet Take 10 mg by mouth daily as needed for allergies      multivitamin, therapeutic (THERA-VIT) TABS tablet Take 1 tablet by mouth daily      ondansetron (ZOFRAN ODT) 4 MG ODT tab Take 4 mg by mouth every 8 hours as needed for nausea      pantoprazole  (PROTONIX) 40 MG EC tablet Take 1 tablet (40 mg) by mouth daily  Qty: 30 tablet, Refills: 0    Associated Diagnoses: Acute kidney injury (H); Abnormal LFTs; Closed nondisplaced fracture of third cervical vertebra with routine healing, unspecified fracture morphology, subsequent encounter      thiamine (B-1) 100 MG tablet Take 1 tablet (100 mg) by mouth daily  Qty: 30 tablet, Refills: 0    Associated Diagnoses: Acute kidney injury (H); Abnormal LFTs; Closed nondisplaced fracture of third cervical vertebra with routine healing, unspecified fracture morphology, subsequent encounter         STOP taking these medications       celecoxib (CELEBREX) 200 MG capsule Comments:   Reason for Stopping:                     Consults   Orthopedic Surgery, Acute Pain team       Immunizations given this encounter     Most Recent Immunizations   Administered Date(s) Administered     COVID-19,PF,Moderna 05/05/2021           Anticoagulation Information      Recent INR results: No results for input(s): INR in the last 168 hours.  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 No acute fracture. No overt dislocation.    Uncomplicated left total hip arthroplasty.    Prior fusion at L5-S1. Advanced degenerative disc changes at L4-L5. Mild degenerative changes in the lumbar spinal elsewhere.   US Abdomen Limited    Impression    IMPRESSION:  1.  No biliary dilatation status post cholecystectomy.           SIGNIFICANT LABORATORY FINDINGS         Discharge Orders         Home Care Referral      Follow Up Care    Please follow-up with Dr. Montiel/Isabella Felipe PA-C in 2 weeks at [facility]. Call our scheduling line at [phone] to make an appointment if you do not already have one scheduled.     Reason for your hospital stay    Hip Pain     Follow-up and recommended labs and tests     Follow up with primary care provider, Kika Leblanc, within 7 days to evaluate medication change, to evaluate treatment change, for hospital follow- up, and regarding new diagnosis.  The following labs/tests are recommended: Liver Function Tests after cymbalta has been tapered off. Check  Vitamin D Levels in September after replacement.     Follow up with Orthopedic Surgery regarding surgery planning.     Activity    Your activity upon discharge: activity as tolerated     Diet    Follow this diet upon discharge:       Regular Diet Adult       Examination   General: In NAD.  HEENT: NCAT, EOMI  CV: Normal S1S2, regular rhythm, normal rate  Lungs: CTAB  Abdomen: Soft, NT, ND, +BS  Extremities: No LE edema b/l  Neuro: AAOx3.   Psych: Normal Affect    Please see EMR for more detailed significant labs, imaging, consultant notes etc.    IFransisca MD, personally saw the patient today and spent greater than 30 minutes discharging this patient.    Fransisca Brian MD  [facility]    CC:Kika Leblanc

## 2022-08-09 ENCOUNTER — PATIENT OUTREACH (OUTPATIENT)
Dept: CARE COORDINATION | Facility: CLINIC | Age: 57
End: 2022-08-09

## 2022-08-09 DIAGNOSIS — Z71.89 OTHER SPECIFIED COUNSELING: ICD-10-CM

## 2022-08-09 NOTE — PROGRESS NOTES
"Clinic Care Coordination Contact  [facility]: Post-Discharge Note  SITUATION                                                      Admission:    Admission Date: 08/27/22   Reason for Admission: Severe Osteoarthritis of right hip  Discharge:   Discharge Date: 08/08/22  Discharge Diagnosis: Severe Osteoarthritis of right hip    BACKGROUND                                                      Per hospital discharge summary and inpatient provider notes:  56-year-old female with history of anxiety/depression, mood disorder, chronic low back pain, fibromyalgia, hypertension, hepatic steatosis, asthma, prior C3 transverse process fracture after fall and reflux esophagitis with benign-appearing esophageal stricture, nonbleeding duodenal ulcer and known hip OA presents with right hip pain.    ASSESSMENT      Enrollment  Primary Care Care Coordination Status: Not a Candidate    Discharge Assessment  How are you doing now that you are home?: \" Going pretty good acually just trying to firgue my home out with this wheelcahir and walker\"  How are your symptoms? (Red Flag symptoms escalate to triage hotline per guidelines): Improved  Do you feel your condition is stable enough to be safe at home until your provider visit?: Yes  Does the patient have their discharge instructions? : Yes  Does the patient have questions regarding their discharge instructions? : No  Were you started on any new medications or were there changes to any of your previous medications? : Yes  Does the patient have all of their medications?: Yes  Do you have questions regarding any of your medications? : No  Do you have all of your needed medical supplies or equipment (DME)?  (i.e. oxygen tank, CPAP, cane, etc.): Yes  Discharge follow-up appointment scheduled within 14 calendar days? : Yes  Discharge Follow Up Appointment Date: 08/15/22  Discharge Follow Up Appointment Scheduled with?: Specialty Care Provider    Post-op ([facility] Only)  If the patient " had a surgery or procedure, do they have any questions for a nurse?: No             PLAN                                                      Outpatient Plan:    Follow up with primary care provider, Kika Leblanc, within 7 days to evaluate medication change, to evaluate treatment change, for hospital follow- up, and regarding new diagnosis.  The following labs/tests are recommended: Liver Function Tests after cymbalta has been tapered off. Check  Vitamin D Levels in September after replacement.      Follow up with Orthopedic Surgery regarding surgery planning.          Activity     Your activity upon discharge: activity as tolerated          Diet     Follow this diet upon discharge:       Regular Diet Adult       Future Appointments   Date Time Provider Department Center   8/10/2022 11:30 AM Kortney Ellison CNP [facility]   8/15/2022  1:00 PM [facility]   8/16/2022  2:00 PM Dejah Schofield, HARRISON CNP [facility]         For any urgent concerns, please contact our 24 hour nurse triage line: [phone] ([phone])         Brooklyn Duarte MA

## 2022-08-10 ENCOUNTER — VIRTUAL VISIT (OUTPATIENT)
Dept: ADDICTION MEDICINE | Facility: CLINIC | Age: 57
End: 2022-08-10
Payer: COMMERCIAL

## 2022-08-10 DIAGNOSIS — M16.11 PRIMARY OSTEOARTHRITIS OF RIGHT HIP: ICD-10-CM

## 2022-08-10 DIAGNOSIS — F10.20 ALCOHOL USE DISORDER, SEVERE, DEPENDENCE (H): Primary | ICD-10-CM

## 2022-08-10 PROCEDURE — 99214 OFFICE O/P EST MOD 30 MIN: CPT | Mod: 95 | Performed by: NURSE PRACTITIONER

## 2022-08-10 NOTE — PROGRESS NOTES
[facility] Addiction Medicine    A/P                                                    ASSESSMENT/PLAN  Diagnoses and all orders for this visit:  Alcohol use disorder, severe, dependence (H)  Rachel has not had a drink in over a month.  She remains motivated for her recovery.  She was scheduled to start IOP at [facility] and [facility] however she was unable secondary to recent hospitalization for ongoing right hip pain impairing her functional mobility.  She plans to resume IOP once she completes rehab following hip surgery scheduled for 9/6.  She was prescribed gabapentin 900 mg 3 times daily to help with postacute withdrawal symptoms and cravings.  This was increased by pain provider during her hospitalization to help with severe pain.  She denies cravings and urges to drink.  Recommend that she resume IOP once she is able following rehab.    Severe Osteoarthritis of right hip  Pain provider saw patient during her recent hospitalization.  She received 10 mg oxycodone twice daily as needed which she reports is effective for her.  Pain provider was reluctant to provide pain medication as outpatient due to her recent sobriety.  Encouraged her to contact her PCP or [facility] orthopedics for pain medication recommendations.  Recommend her pain be treated.  Could use small doses of opiates until she has surgery ie oxycodone 5 mg  BID prn.  Will defer to her PCP or orthopedic surgeon for recommendations.  No orders of the defined types were placed in this encounter.      Problem list updated Aug 10, 2022   No problems updated.        PDMP Review       Value Time User    State PDMP site checked  Yes 8/10/2022 11:33 AM Kortney Ellison CNP            RTC  Return in about 3 weeks (around 8/31/2022) for Follow up, with me, using a phone visit schedule at 1130.      Counseled the patient on the importance of having a recovery program in addition to medication to manage recovery.  Components include avoiding  isolating, having willingness to change, avoiding triggers and managing cravings. Encouraged having some type of sober network and practicing honesty with trusted support person(s). Encouraged other services such as counseling, 12 step or other self-help organizations.          SUBJECTIVE                                                    Rachel De Jesus is a 56 year old female who presents to clinic today for follow up    Visit performed Virtual, via telephone    Time spent on phone call: 11;35 AM-11:51AM    Recent HPI Details: 7/15/22  Rachel De Jesus is a 56 year old female with a history of severe alcohol use disorder, GI bleed chronic pain, duodenal ulcer, hepatic steatosis  who presents for initial visit for addiction consultation and management referred by Roxie Talamantes MD for management of Alcohol Use Disorder (AUD)     Rachel was initially saw by addiction medicine during her most recent hospitalization from 6/28- 7/1/2022.  She was hospitalized following multiple falls while intoxicated sustaining a C3 fracture of the transverse process, melena found to have a duodenal ulcer on EGD.  She initially was not forthcoming with her alcohol intake during her hospitalization but tells me today that she did end up telling the truth about how much she was drinking.  States that week leading up to her hospitalization she was drinking from the time she woke up to she went to sleep at night.  She she states that she was drinking (2) 1.75 L of vodka every week for the since the pandemic due to isolation.  She states that she drink a couple of drinks weekly prior to that however in reviewing her initial consultation she reported that her drinking began to escalate in 2012.  Her last drink was 6/28/2022.  She is very motivated to her recovery.  She is in the process of starting IOP at [facility].  She has attended a few AA meetings since her discharge from the hospital.  She denies cravings or urges  to drink at this time.  She is taking gabapentin 900 mg 3 times a day for cravings, postacute withdrawal symptoms, and pain.     She has severe right hip pain 8 out of 10 that impairs her mobility.  She ambulates with a severe limp favoring her left leg.  She was scheduled for surgery however it was canceled due to hypertension.  She needs to schedule a follow-up with Ortho.  Her pain was a contributor to her alcohol use.       She denies any other substance use.  She states that she was prescribed Xanax years ago and had a leftover prescription.  She does not take 0.5 mg on occasion to help with pain last taken last   Alcohol use disorder, severe, dependence (H)  Adan has not had a drink since 6/28/2022.  She is using gabapentin 900 mg 3 times a day for postacute withdrawal symptoms, cravings, and pain.  She is motivated for her recovery and will begin IOP at [facility] as soon as there is an opening.  She is also attending AA.  Provided her resources for AA meetings including women's group.  Encouraged her to find a sponsor.  Discussed risks and benefits of naltrexone today, she is not experiencing cravings and declined a prescription today.  Encouraged her to reach out if she finds herself struggling with cravings and urges to drink and a prescription for naltrexone will be provided.  -     gabapentin (NEURONTIN) 300 MG capsule; Take 3 capsules (900 mg) by mouth every 8 hours     Closed fracture of transverse process of cervical vertebra, initial encounter (H)  C3 fracture sustained during one of her falls prior to admission.  She is wearing a neck brace during today's interview.  Taking Tylenol and gabapentin for pain.  Follow-up with PCP.    TODAY'S VISIT  HPI Aug 10, 2022  Rachel Cruz Neal is a 56 year old female with a history of severe alcohol use disorder, GI bleed chronic pain, duodenal ulcer, hepatic steatosis  who presents for follow up for  Alcohol Use Disorder (AUD)    Rachel was recently  hospitalized for ongoing right-sided hip pain impairing her mobility and impacting her quality of life from 7/27- 8/8/2022.  She was scheduled to start IOP at [facility] and [facility], but she was unable to attend group due to recent hospitalization.  She has very limited functional mobility at this time which prevents her from continuing IOP and AA attendance at this time.  She plans to resume outpatient treatment once her surgery and rehab have been completed.  Surgery is scheduled for 9/6.  She reports severe pain and is wondering if something else could be ordered.  Gabapentin was increased during hospitalization by pain provider to 1200 mg 3 times daily.  She was receiving oxycodone 10 mg twice daily as needed, which she felt was effectively treating her pain. She has pre-op appt next week with her PCP.    She denies any alcohol use since 7/1/2022.  She denies cravings and urges.  She is grateful that she has not been experiencing strong urges to drink.  She remains motivated to continue abstinence at this time.    OBJECTIVE                                                    PHYSICAL EXAM:  There were no vitals taken for this visit.    GENERAL: healthy, alert and no distress  RESP: no audible wheeze, cough.  No increased work of breathing.  Able to speak fully in complete sentences.  PSYCH: mentation appears normal, affect normal/bright, judgement and insight intact, normal speech      LAB  No results found for any visits on 08/10/22.      HISTORY                                                    Problem list reviewed & adjusted, as indicated.  Patient Active Problem List   Diagnosis     Enthesopathy of hip region     Backache     Cervicalgia     Melena     C3 cervical fracture (H)     ARJUN (acute kidney injury) (H)     Hyperkalemia     Hyponatremia     Abnormal LFTs     Asthma     Hepatic steatosis     Chronic low back pain     Depression     Fibromyalgia     HTN (hypertension)     Obesity     Acute kidney  injury (H)     Closed fracture of transverse process of cervical vertebra, initial encounter (H)     Alcohol use disorder, severe, dependence (H)     Severe Osteoarthritis of right hip         MEDICATION LIST (prior to visit)  gabapentin (NEURONTIN) 400 MG capsule, Take 3 capsules (1,200 mg) by mouth every 8 hours for 30 days  albuterol (PROAIR HFA/PROVENTIL HFA/VENTOLIN HFA) 108 (90 Base) MCG/ACT inhaler, Inhale 2 puffs into the lungs every 4 hours as needed (Patient not taking: Reported on 8/10/2022)  amLODIPine (NORVASC) 5 MG tablet, Take 1 tablet (5 mg) by mouth daily for 30 days (Patient not taking: Reported on 8/10/2022)  [START ON 8/14/2022] DULoxetine (CYMBALTA) 20 MG capsule, Take 1 capsule (20 mg) by mouth At Bedtime for 2 days - Take Cymbalta 20 mg 8/14 - 8/15 and then stop (Patient not taking: Reported on 8/10/2022)  DULoxetine (CYMBALTA) 30 MG capsule, Take 1 capsule (30 mg) by mouth At Bedtime for 4 days - Take Cymbalta 30 mg 8/10 - 8/13 (Patient not taking: Reported on 8/10/2022)  DULoxetine 40 MG CPEP, Take 40 mg by mouth At Bedtime for 2 days - Take Cymbalta 40 mg 8/8 - 8/9 (Patient not taking: Reported on 8/10/2022)  fluticasone (FLONASE) 50 MCG/ACT nasal spray, Spray 2 sprays in nostril daily as needed (Patient not taking: Reported on 8/10/2022)  folic acid (FOLVITE) 1 MG tablet, Take 1 tablet (1 mg) by mouth daily (Patient not taking: Reported on 8/10/2022)  ketotifen (ZADITOR) 0.025 % ophthalmic solution, Place 1 drop into both eyes 2 times daily as needed (Patient not taking: Reported on 8/10/2022)  loratadine (CLARITIN) 10 MG tablet, Take 10 mg by mouth daily as needed for allergies (Patient not taking: Reported on 8/10/2022)  multivitamin, therapeutic (THERA-VIT) TABS tablet, Take 1 tablet by mouth daily (Patient not taking: Reported on 8/10/2022)  ondansetron (ZOFRAN ODT) 4 MG ODT tab, Take 4 mg by mouth every 8 hours as needed for nausea (Patient not taking: Reported on  8/10/2022)  pantoprazole (PROTONIX) 40 MG EC tablet, Take 1 tablet (40 mg) by mouth daily (Patient not taking: Reported on 8/10/2022)  thiamine (B-1) 100 MG tablet, Take 1 tablet (100 mg) by mouth daily (Patient not taking: Reported on 8/10/2022)  [START ON 8/11/2022] vitamin D2 (ERGOCALCIFEROL) 79835 units (1250 mcg) capsule, Take 1 capsule (50,000 Units) by mouth once a week for 4 doses Take every Thursday (Patient not taking: Reported on 8/10/2022)  [DISCONTINUED] celecoxib (CELEBREX) 200 MG capsule, Take 200 mg by mouth 2 times daily  [DISCONTINUED] lisinopril (ZESTRIL) 20 MG tablet, Take 20 mg by mouth At Bedtime    No current facility-administered medications on file prior to visit.      MEDICATION LIST (after visit)  Current Outpatient Medications   Medication     gabapentin (NEURONTIN) 400 MG capsule     albuterol (PROAIR HFA/PROVENTIL HFA/VENTOLIN HFA) 108 (90 Base) MCG/ACT inhaler     amLODIPine (NORVASC) 5 MG tablet     [START ON 8/14/2022] DULoxetine (CYMBALTA) 20 MG capsule     DULoxetine (CYMBALTA) 30 MG capsule     DULoxetine 40 MG CPEP     fluticasone (FLONASE) 50 MCG/ACT nasal spray     folic acid (FOLVITE) 1 MG tablet     ketotifen (ZADITOR) 0.025 % ophthalmic solution     loratadine (CLARITIN) 10 MG tablet     multivitamin, therapeutic (THERA-VIT) TABS tablet     ondansetron (ZOFRAN ODT) 4 MG ODT tab     pantoprazole (PROTONIX) 40 MG EC tablet     thiamine (B-1) 100 MG tablet     [START ON 8/11/2022] vitamin D2 (ERGOCALCIFEROL) 56825 units (1250 mcg) capsule     No current facility-administered medications for this visit.         No Known Allergies    Additional MDM Details:  none      Kortney Ellison, DNP,MSN, AGNP-C  [facility]   [address], [address]   [place] [address]   Phone # [phone]

## 2022-08-10 NOTE — PROGRESS NOTES
"[facility] Addiction Medicine       Rooming information:  Approximate last substance use: ETOH ~ 1 month ago  Side effects related to medication for substance use (constipation, dry mouth, sedation?) denies  Narcan currently available: NA  Other recent substance use:    \"Denies\"   NICOTINE- No  If using nicotine, ready to quit? No                                                      LMP: Hysterectomy    Primary care provider: Kika Leblanc MD     Mental health provider:  (follow up on MH referral if needed)           This video/telephone visit will be conducted via a call between you and your physician/provider. We have found that certain health care needs can be provided without the need for an in-person physical exam. This service lets us provide the care you need with a video /telephone conversation. If a prescription is necessary we can send it directly to your pharmacy. If lab work is needed we can place an order for that and you can then stop by our lab to have the test done at a later time.    Just as we bill insurance for in-person visits, we also bill insurance for video/telephone visits. If you have questions about your insurance coverage, we recommend that you speak with your insurance company.    Patient has given verbal consent for video/Telephone visit? yes    Patient would like a phone visit, please connect : Call to [phone]   Reason for visit: Checking in.   Pt spent 12 days in the hospital ( 7/27-8/8) and has an upcoming hip surgery scheduled for 9/6/22. Said she quit drinking and smoking.    Patient verified allergies, medications and pharmacy via phone. Reports taking only Gabapentin and said she is more emotional today than normal     Patient states she  is ready for visit.        Joyce Vásquez  August 10, 2022  11:06 AM  "

## 2022-08-15 ENCOUNTER — HOSPITAL ENCOUNTER (OUTPATIENT)
Dept: CT IMAGING | Facility: HOSPITAL | Age: 57
Discharge: HOME | End: 2022-08-15
Attending: NURSE PRACTITIONER | Admitting: NURSE PRACTITIONER
Payer: COMMERCIAL

## 2022-08-15 DIAGNOSIS — S12.201D CLOSED NONDISPLACED FRACTURE OF THIRD CERVICAL VERTEBRA WITH ROUTINE HEALING, UNSPECIFIED FRACTURE MORPHOLOGY, SUBSEQUENT ENCOUNTER: ICD-10-CM

## 2022-08-15 PROCEDURE — 72125 CT NECK SPINE W/O DYE: CPT

## 2022-08-17 ENCOUNTER — DOCUMENTATION ONLY (OUTPATIENT)
Facility: CLINIC | Age: 57
End: 2022-08-17
Payer: COMMERCIAL

## 2022-08-17 DIAGNOSIS — Z53.9 ERRONEOUS ENCOUNTER--DISREGARD: Primary | ICD-10-CM

## 2022-08-22 ENCOUNTER — VIRTUAL VISIT (OUTPATIENT)
Dept: NEUROSURGERY | Facility: CLINIC | Age: 57
End: 2022-08-22
Payer: COMMERCIAL

## 2022-08-22 DIAGNOSIS — S12.201D CLOSED NONDISPLACED FRACTURE OF THIRD CERVICAL VERTEBRA WITH ROUTINE HEALING, UNSPECIFIED FRACTURE MORPHOLOGY, SUBSEQUENT ENCOUNTER: Primary | ICD-10-CM

## 2022-08-22 PROCEDURE — 99213 OFFICE O/P EST LOW 20 MIN: CPT | Mod: 95 | Performed by: NURSE PRACTITIONER

## 2022-08-22 NOTE — PROGRESS NOTES
Neurosurgery telephone Note  8/22/22    A/P: Rachel De Jesus is a 56 year old female hx multilevel ACDF 2017 Dr Nina who is s/p hospital consult 6/28/22 for fall while intoxicated resulting in C3 TP fracture through the transverse foramen. No vertebral artery injury though does have plaque causing moderate stenosis. Dr Conner surgeon following.    Rachel is doing well. No neck, arm or leg symptoms. She is only wearing her collar for a few hours a day. Has tried 3 diff collars. She feels like if it fit better, she would wear more often. She is in a wheelchair, mobility is quite difficult given her broken hand and bad hip. She has been sober since injury however and she has hip replacement surgery coming up on 9/6. Denies any radicular arm or leg pain, numbness, weakness, or change in bowel or bladder control. We reviewed her new cervical CT scan results. Fracture is progressing in its healing but is not fully healed. I did not clear the collar. Recommend continuing collar at all times and follow up in 4-6wks with new cervical CT scan to eval for complete healing. If fully healed, would then wean from collar. Rachel was given the # for orthotics, she will call for updated collar fit. Also given # for radiology to make her a disc per her request of imaging. She would like a report of her CT scan results mailed to her.    Plan:  1. Continue cervical collar. Wear at all times. Patient agrees. No lifting >5lbs, avoid strenuous exercise, bending, twisting, chores. Call orthotics for fit update.  2. Ok to proceed with Hip surgery planned 9/6- neutral intubation and must wear collar entire time preop, intraop, postop  3. Patient previously declined neurology referral for vertebral artery stenosis.  4. Follow up in 6wks in person if possible with new cervical CT scan. If fracture has healed, clear collar and refer to PT  5. Call sooner if questions or concerns        Imaging  Personally reviewed and discussed with  patient  EXAM: CT CERVICAL SPINE W/O CONTRAST  LOCATION: [facility]  DATE/TIME: 8/15/2022 1:11 PM     INDICATION: Neck pain; Trauma; Cervical x ray with questionable finding or inadequate coverage. C4-C7 ACDF, follow-up right C3 transverse process fracture.  COMPARISON: Cervical spine radiographs 07/14/2022 and cervical spine CT 06/28/2022  TECHNIQUE: Routine CT Cervical Spine without IV contrast. Multiplanar reformats. Dose reduction techniques were used.     FINDINGS:   VERTEBRA: Stable normal vertebral body heights. Straightening of the usual cervical lordosis with 2 mm anterolisthesis of C2 on C3. Partial interval healing of right C3 transverse process fracture, though with persistent conspicuity of the fracture line   (axial series 3, image #111). No interval acute fracture or traumatic subluxation. Redemonstration of ACDF at C4-C7, again with solid incorporation of the C4-C5 and C5-C6 interbody spacers and partially solid incorporation of the C6-C7 interbody spacer,   though to a lesser degree. No hardware fracture or loosening. Fusion of the right C4-C5 facet joint.     CANAL/FORAMINA: No high grade spinal canal or neural foraminal stenosis. Moderate neuroforaminal stenosis on the right at C3-C4 and C6-C7.     EXTRASPINAL: Scattered atherosclerotic calcifications throughout the bilateral internal carotid arteries. Visualized lung apices are unremarkable.                                                                         IMPRESSION:  1.  Partial interval healing of right C3 transverse process fracture, though with persistent conspicuity of the fracture line.  2.  Redemonstration of ACDF at C4-C7, again with only partially solid incorporation of the C6-C7 interspinous spacer.  3.  No interval acute fracture or traumatic subluxation.  4.  No high-grade spinal canal or neuroforaminal stenosis.    16mins spent talking to patient on the phone    Dejah CAMPOVERDE  [facility]  Neurosurgery  O. [phone]

## 2022-08-22 NOTE — LETTER
8/22/2022         RE: Rachel De Jesus  [address]   [place] [address]        Dear Colleague,    Thank you for referring your patient, Rachel De Jesus, to the [facility]. Please see a copy of my visit note below.      Neurosurgery telephone Note  8/22/22    A/P: Rachel De Jesus is a 56 year old female hx multilevel ACDF 2017 Dr Nina who is s/p hospital consult 6/28/22 for fall while intoxicated resulting in C3 TP fracture through the transverse foramen. No vertebral artery injury though does have plaque causing moderate stenosis. Dr Conner surgeon following.    Rachel is doing well. No neck, arm or leg symptoms. She is only wearing her collar for a few hours a day. Has tried 3 diff collars. She feels like if it fit better, she would wear more often. She is in a wheelchair, mobility is quite difficult given her broken hand and bad hip. She has been sober since injury however and she has hip replacement surgery coming up on 9/6. Denies any radicular arm or leg pain, numbness, weakness, or change in bowel or bladder control. We reviewed her new cervical CT scan results. Fracture is progressing in its healing but is not fully healed. I did not clear the collar. Recommend continuing collar at all times and follow up in 4-6wks with new cervical CT scan to eval for complete healing. If fully healed, would then wean from collar. Rachel was given the # for orthotics, she will call for updated collar fit. Also given # for radiology to make her a disc per her request of imaging. She would like a report of her CT scan results mailed to her.    Plan:  1. Continue cervical collar. Wear at all times. Patient agrees. No lifting >5lbs, avoid strenuous exercise, bending, twisting, chores. Call orthotics for fit update.  2. Ok to proceed with Hip surgery planned 9/6- neutral intubation and must wear collar entire time preop, intraop, postop  3. Patient previously  declined neurology referral for vertebral artery stenosis.  4. Follow up in 6wks in person if possible with new cervical CT scan. If fracture has healed, clear collar and refer to PT  5. Call sooner if questions or concerns        Imaging  Personally reviewed and discussed with patient  EXAM: CT CERVICAL SPINE W/O CONTRAST  LOCATION: [facility]  DATE/TIME: 8/15/2022 1:11 PM     INDICATION: Neck pain; Trauma; Cervical x ray with questionable finding or inadequate coverage. C4-C7 ACDF, follow-up right C3 transverse process fracture.  COMPARISON: Cervical spine radiographs 07/14/2022 and cervical spine CT 06/28/2022  TECHNIQUE: Routine CT Cervical Spine without IV contrast. Multiplanar reformats. Dose reduction techniques were used.     FINDINGS:   VERTEBRA: Stable normal vertebral body heights. Straightening of the usual cervical lordosis with 2 mm anterolisthesis of C2 on C3. Partial interval healing of right C3 transverse process fracture, though with persistent conspicuity of the fracture line   (axial series 3, image #111). No interval acute fracture or traumatic subluxation. Redemonstration of ACDF at C4-C7, again with solid incorporation of the C4-C5 and C5-C6 interbody spacers and partially solid incorporation of the C6-C7 interbody spacer,   though to a lesser degree. No hardware fracture or loosening. Fusion of the right C4-C5 facet joint.     CANAL/FORAMINA: No high grade spinal canal or neural foraminal stenosis. Moderate neuroforaminal stenosis on the right at C3-C4 and C6-C7.     EXTRASPINAL: Scattered atherosclerotic calcifications throughout the bilateral internal carotid arteries. Visualized lung apices are unremarkable.                                                                         IMPRESSION:  1.  Partial interval healing of right C3 transverse process fracture, though with persistent conspicuity of the fracture line.  2.  Redemonstration of ACDF at C4-C7, again with  only partially solid incorporation of the C6-C7 interspinous spacer.  3.  No interval acute fracture or traumatic subluxation.  4.  No high-grade spinal canal or neuroforaminal stenosis.    16mins spent talking to patient on the phone    Dejah CAMPOVERDE  [facility] Neurosurgery  O. [phone]              Again, thank you for allowing me to participate in the care of your patient.        Sincerely,        HARRISON Reynolds CNP

## 2022-09-10 ENCOUNTER — HEALTH MAINTENANCE LETTER (OUTPATIENT)
Age: 57
End: 2022-09-10

## 2022-09-20 NOTE — PROGRESS NOTES
MH&A Post-Appointment Chart -check      Correct pharmacy verified with patient and updated in chart? [x] yes []no    Medications ordered this visit were e-scribed.  Verified by order class [x] yes  [] no    List Medications:  gabapentin (NEURONTIN) 300 MG capsule    Medication changes or discontinuations were communicated to patient's pharmacy: [] yes  [x] no    UA collected [x] yes  [] no  [] n/a-virtual     Outside referrals / labs, etc support staff to follow up: [] yes  [x] no    Future appointment was made: [x] yes  [] no  [] n/a  Future Appointments 7/15/2022 - 1/11/2023              Date Visit Type Length Department Provider     8/10/2022 11:30 AM ADDICTION MED RETURN 30 min [facility] ADDICTION MEDICINE Kortney Ellison CNP                   Dictation completed at time of chart check: [] yes  [x] no    I have checked the documentation for today s encounters and the above information has been reviewed and completed.      Tracy Vinson on July 15, 2022 at 4:42 PM    Simple: Patient demonstrates quick and easy understanding/Verbalized Understanding

## 2023-10-01 ENCOUNTER — HEALTH MAINTENANCE LETTER (OUTPATIENT)
Age: 58
End: 2023-10-01

## 2024-09-15 ENCOUNTER — HEALTH MAINTENANCE LETTER (OUTPATIENT)
Age: 59
End: 2024-09-15

## 2024-11-24 ENCOUNTER — HEALTH MAINTENANCE LETTER (OUTPATIENT)
Age: 59
End: 2024-11-24

## 2025-08-01 ENCOUNTER — MEDICAL CORRESPONDENCE (OUTPATIENT)
Dept: HEALTH INFORMATION MANAGEMENT | Facility: CLINIC | Age: 60
End: 2025-08-01
Payer: MEDICARE

## 2025-08-01 ENCOUNTER — TRANSFERRED RECORDS (OUTPATIENT)
Dept: HEALTH INFORMATION MANAGEMENT | Facility: CLINIC | Age: 60
End: 2025-08-01
Payer: MEDICARE

## 2025-08-06 ENCOUNTER — TRANSCRIBE ORDERS (OUTPATIENT)
Dept: OTHER | Age: 60
End: 2025-08-06

## 2025-08-06 DIAGNOSIS — M26.603 BILATERAL TEMPOROMANDIBULAR JOINT DISORDER: ICD-10-CM

## 2025-08-06 DIAGNOSIS — M54.12 RADICULOPATHY, CERVICAL REGION: ICD-10-CM

## 2025-08-06 DIAGNOSIS — G89.4 CHRONIC PAIN SYNDROME: Primary | ICD-10-CM

## 2025-08-15 ENCOUNTER — HOSPITAL ENCOUNTER (OUTPATIENT)
Dept: GENERAL RADIOLOGY | Facility: HOSPITAL | Age: 60
Discharge: HOME | End: 2025-08-15
Payer: COMMERCIAL

## 2025-08-15 DIAGNOSIS — M46.1 SACROILIITIS, NOT ELSEWHERE CLASSIFIED: ICD-10-CM

## 2025-08-15 PROCEDURE — 73522 X-RAY EXAM HIPS BI 3-4 VIEWS: CPT

## (undated) DEVICE — PROBE BICAP 7FR BP-7300A

## (undated) DEVICE — FORCEP BIOPSY 2.3MM DISP COATED 000388

## (undated) DEVICE — SOL WATER IRRIG 1000ML BOTTLE 2F7114

## (undated) DEVICE — SUCTION MANIFOLD NEPTUNE 2 SYS 1 PORT 702-025-000

## (undated) DEVICE — TUBING SUCTION MEDI-VAC 1/4"X20' N620A - HE

## (undated) DEVICE — NDL SCLEROTHERAPY 25GA CARR-LOCK  00711811

## (undated) RX ORDER — PROPOFOL 10 MG/ML
INJECTION, EMULSION INTRAVENOUS
Status: DISPENSED
Start: 2022-06-29